# Patient Record
Sex: MALE | Race: WHITE | NOT HISPANIC OR LATINO | Employment: OTHER | URBAN - METROPOLITAN AREA
[De-identification: names, ages, dates, MRNs, and addresses within clinical notes are randomized per-mention and may not be internally consistent; named-entity substitution may affect disease eponyms.]

---

## 2021-03-26 ENCOUNTER — TELEPHONE (OUTPATIENT)
Dept: TRANSPLANT | Facility: CLINIC | Age: 56
End: 2021-03-26

## 2021-03-30 DIAGNOSIS — Z76.82 ORGAN TRANSPLANT CANDIDATE: Primary | ICD-10-CM

## 2021-04-13 ENCOUNTER — TELEPHONE (OUTPATIENT)
Dept: TRANSPLANT | Facility: CLINIC | Age: 56
End: 2021-04-13

## 2021-06-01 ENCOUNTER — OFFICE VISIT (OUTPATIENT)
Dept: TRANSPLANT | Facility: CLINIC | Age: 56
End: 2021-06-01
Payer: MEDICARE

## 2021-06-01 ENCOUNTER — HOSPITAL ENCOUNTER (OUTPATIENT)
Dept: RADIOLOGY | Facility: HOSPITAL | Age: 56
Discharge: HOME | End: 2021-06-01
Attending: NURSE PRACTITIONER
Payer: MEDICARE

## 2021-06-01 ENCOUNTER — TELEPHONE (OUTPATIENT)
Dept: TRANSPLANT | Facility: CLINIC | Age: 56
End: 2021-06-01

## 2021-06-01 VITALS
BODY MASS INDEX: 26.42 KG/M2 | RESPIRATION RATE: 16 BRPM | TEMPERATURE: 99 F | DIASTOLIC BLOOD PRESSURE: 59 MMHG | OXYGEN SATURATION: 100 % | WEIGHT: 188.69 LBS | SYSTOLIC BLOOD PRESSURE: 117 MMHG | HEART RATE: 105 BPM | HEIGHT: 71 IN

## 2021-06-01 DIAGNOSIS — Z76.82 ORGAN TRANSPLANT CANDIDATE: ICD-10-CM

## 2021-06-01 DIAGNOSIS — E78.2 MIXED HYPERLIPIDEMIA: ICD-10-CM

## 2021-06-01 DIAGNOSIS — G47.30 SLEEP APNEA, UNSPECIFIED TYPE: ICD-10-CM

## 2021-06-01 DIAGNOSIS — N18.6 ESRD (END STAGE RENAL DISEASE): ICD-10-CM

## 2021-06-01 DIAGNOSIS — I10 ESSENTIAL HYPERTENSION: ICD-10-CM

## 2021-06-01 DIAGNOSIS — E11.21 DIABETIC NEPHROPATHY ASSOCIATED WITH TYPE 2 DIABETES MELLITUS: ICD-10-CM

## 2021-06-01 PROBLEM — I26.99 PULMONARY EMBOLUS: Status: ACTIVE | Noted: 2021-06-01

## 2021-06-01 PROBLEM — I27.82 CHRONIC PULMONARY EMBOLISM: Status: ACTIVE | Noted: 2021-06-01

## 2021-06-01 PROCEDURE — 76770 US RETROPERITONEAL COMPLETE: ICD-10-PCS | Mod: 26,TXP,, | Performed by: RADIOLOGY

## 2021-06-01 PROCEDURE — 93978 VASCULAR STUDY: CPT | Mod: 26,TXP,, | Performed by: RADIOLOGY

## 2021-06-01 PROCEDURE — 71046 XR CHEST PA AND LATERAL: ICD-10-PCS | Mod: 26,TXP,, | Performed by: RADIOLOGY

## 2021-06-01 PROCEDURE — 93978 US DOPP ILIACS BILATERAL: ICD-10-PCS | Mod: 26,TXP,, | Performed by: RADIOLOGY

## 2021-06-01 PROCEDURE — 71046 X-RAY EXAM CHEST 2 VIEWS: CPT | Mod: TC,TXP

## 2021-06-01 PROCEDURE — 76770 US EXAM ABDO BACK WALL COMP: CPT | Mod: 26,TXP,, | Performed by: RADIOLOGY

## 2021-06-01 PROCEDURE — 99244 PR OFFICE CONSULTATION,LEVEL IV: ICD-10-PCS | Mod: S$PBB,TXP,, | Performed by: SURGERY

## 2021-06-01 PROCEDURE — 72170 X-RAY EXAM OF PELVIS: CPT | Mod: 26,TXP,, | Performed by: RADIOLOGY

## 2021-06-01 PROCEDURE — 99999 PR PBB SHADOW E&M-EST. PATIENT-LVL IV: ICD-10-PCS | Mod: PBBFAC,TXP,, | Performed by: INTERNAL MEDICINE

## 2021-06-01 PROCEDURE — 99999 PR PBB SHADOW E&M-EST. PATIENT-LVL IV: CPT | Mod: PBBFAC,TXP,, | Performed by: INTERNAL MEDICINE

## 2021-06-01 PROCEDURE — 93978 VASCULAR STUDY: CPT | Mod: TC,TXP

## 2021-06-01 PROCEDURE — 99205 PR OFFICE/OUTPT VISIT, NEW, LEVL V, 60-74 MIN: ICD-10-PCS | Mod: S$PBB,TXP,, | Performed by: INTERNAL MEDICINE

## 2021-06-01 PROCEDURE — 71046 X-RAY EXAM CHEST 2 VIEWS: CPT | Mod: 26,TXP,, | Performed by: RADIOLOGY

## 2021-06-01 PROCEDURE — 72170 X-RAY EXAM OF PELVIS: CPT | Mod: TC,TXP

## 2021-06-01 PROCEDURE — 76770 US EXAM ABDO BACK WALL COMP: CPT | Mod: TC,TXP

## 2021-06-01 PROCEDURE — 99205 OFFICE O/P NEW HI 60 MIN: CPT | Mod: S$PBB,TXP,, | Performed by: INTERNAL MEDICINE

## 2021-06-01 PROCEDURE — 99244 OFF/OP CNSLTJ NEW/EST MOD 40: CPT | Mod: S$PBB,TXP,, | Performed by: SURGERY

## 2021-06-01 PROCEDURE — 99214 OFFICE O/P EST MOD 30 MIN: CPT | Mod: PBBFAC,25,TXP | Performed by: INTERNAL MEDICINE

## 2021-06-01 PROCEDURE — 72170 XR PELVIS ROUTINE AP: ICD-10-PCS | Mod: 26,TXP,, | Performed by: RADIOLOGY

## 2021-06-01 RX ORDER — MIDODRINE HYDROCHLORIDE 10 MG/1
10 TABLET ORAL 2 TIMES DAILY
Status: ON HOLD | COMMUNITY
Start: 2021-04-21 | End: 2021-12-28 | Stop reason: HOSPADM

## 2021-06-01 RX ORDER — LANOLIN ALCOHOL/MO/W.PET/CERES
1000 CREAM (GRAM) TOPICAL DAILY
COMMUNITY

## 2021-06-01 RX ORDER — CALCIUM ACETATE 667 MG/1
667 CAPSULE ORAL 3 TIMES DAILY
Status: ON HOLD | COMMUNITY
Start: 2021-05-12 | End: 2021-12-28 | Stop reason: HOSPADM

## 2021-06-01 RX ORDER — POTASSIUM CHLORIDE 750 MG/1
10 TABLET, EXTENDED RELEASE ORAL 2 TIMES DAILY
Status: ON HOLD | COMMUNITY
Start: 2021-04-20 | End: 2021-12-28 | Stop reason: HOSPADM

## 2021-06-01 RX ORDER — CHOLECALCIFEROL (VITAMIN D3) 50 MCG
2000 TABLET ORAL DAILY
COMMUNITY

## 2021-06-01 RX ORDER — INSULIN ASPART 100 [IU]/ML
1-2 INJECTION, SOLUTION INTRAVENOUS; SUBCUTANEOUS
Status: ON HOLD | COMMUNITY
End: 2021-12-28 | Stop reason: SDUPTHER

## 2021-06-01 RX ORDER — PANTOPRAZOLE SODIUM 40 MG/1
1 TABLET, DELAYED RELEASE ORAL DAILY
Status: ON HOLD | COMMUNITY
Start: 2021-04-13 | End: 2021-12-28 | Stop reason: HOSPADM

## 2021-06-02 ENCOUNTER — TELEPHONE (OUTPATIENT)
Dept: TRANSPLANT | Facility: CLINIC | Age: 56
End: 2021-06-02

## 2021-06-08 ENCOUNTER — DOCUMENTATION ONLY (OUTPATIENT)
Dept: TRANSPLANT | Facility: CLINIC | Age: 56
End: 2021-06-08

## 2021-06-10 ENCOUNTER — TELEPHONE (OUTPATIENT)
Dept: TRANSPLANT | Facility: CLINIC | Age: 56
End: 2021-06-10

## 2021-06-21 ENCOUNTER — TELEPHONE (OUTPATIENT)
Dept: TRANSPLANT | Facility: CLINIC | Age: 56
End: 2021-06-21

## 2021-07-19 ENCOUNTER — TELEPHONE (OUTPATIENT)
Dept: TRANSPLANT | Facility: CLINIC | Age: 56
End: 2021-07-19

## 2021-08-13 ENCOUNTER — COMMITTEE REVIEW (OUTPATIENT)
Dept: TRANSPLANT | Facility: CLINIC | Age: 56
End: 2021-08-13

## 2021-08-13 DIAGNOSIS — Z76.82 ORGAN TRANSPLANT CANDIDATE: Primary | ICD-10-CM

## 2021-08-16 ENCOUNTER — TELEPHONE (OUTPATIENT)
Dept: TRANSPLANT | Facility: CLINIC | Age: 56
End: 2021-08-16

## 2021-08-17 ENCOUNTER — TELEPHONE (OUTPATIENT)
Dept: TRANSPLANT | Facility: CLINIC | Age: 56
End: 2021-08-17

## 2021-08-17 DIAGNOSIS — Z76.82 ORGAN TRANSPLANT CANDIDATE: Primary | ICD-10-CM

## 2021-08-20 DIAGNOSIS — Z76.82 ORGAN TRANSPLANT CANDIDATE: Primary | ICD-10-CM

## 2021-09-07 ENCOUNTER — TELEPHONE (OUTPATIENT)
Dept: TRANSPLANT | Facility: CLINIC | Age: 56
End: 2021-09-07

## 2021-09-29 ENCOUNTER — TELEPHONE (OUTPATIENT)
Dept: TRANSPLANT | Facility: CLINIC | Age: 56
End: 2021-09-29

## 2021-10-27 ENCOUNTER — TELEPHONE (OUTPATIENT)
Dept: TRANSPLANT | Facility: CLINIC | Age: 56
End: 2021-10-27
Payer: MEDICARE

## 2021-10-27 ENCOUNTER — TREATMENT PLANNING (OUTPATIENT)
Dept: TRANSPLANT | Facility: HOSPITAL | Age: 56
End: 2021-10-27
Payer: MEDICARE

## 2021-10-28 ENCOUNTER — TELEPHONE (OUTPATIENT)
Dept: TRANSPLANT | Facility: CLINIC | Age: 56
End: 2021-10-28
Payer: MEDICARE

## 2021-12-24 ENCOUNTER — TELEPHONE (OUTPATIENT)
Dept: TRANSPLANT | Facility: CLINIC | Age: 56
End: 2021-12-24
Payer: MEDICARE

## 2021-12-24 ENCOUNTER — ANESTHESIA (OUTPATIENT)
Dept: SURGERY | Facility: HOSPITAL | Age: 56
DRG: 652 | End: 2021-12-24
Payer: MEDICARE

## 2021-12-24 ENCOUNTER — DOCUMENTATION ONLY (OUTPATIENT)
Dept: TRANSPLANT | Facility: HOSPITAL | Age: 56
End: 2021-12-24
Payer: MEDICARE

## 2021-12-24 ENCOUNTER — ANESTHESIA EVENT (OUTPATIENT)
Dept: SURGERY | Facility: HOSPITAL | Age: 56
DRG: 652 | End: 2021-12-24
Payer: MEDICARE

## 2021-12-24 ENCOUNTER — HOSPITAL ENCOUNTER (INPATIENT)
Facility: HOSPITAL | Age: 56
LOS: 4 days | Discharge: HOME | DRG: 652 | End: 2021-12-28
Attending: TRANSPLANT SURGERY | Admitting: TRANSPLANT SURGERY
Payer: MEDICARE

## 2021-12-24 ENCOUNTER — TELEPHONE (OUTPATIENT)
Dept: TRANSPLANT | Facility: HOSPITAL | Age: 56
End: 2021-12-24
Payer: MEDICARE

## 2021-12-24 DIAGNOSIS — Z01.818 PRE-OP TESTING: ICD-10-CM

## 2021-12-24 DIAGNOSIS — Z76.82 KIDNEY TRANSPLANT CANDIDATE: ICD-10-CM

## 2021-12-24 DIAGNOSIS — Z79.60 LONG-TERM USE OF IMMUNOSUPPRESSANT MEDICATION: ICD-10-CM

## 2021-12-24 DIAGNOSIS — N18.5 ANEMIA OF CHRONIC RENAL FAILURE, STAGE 5: ICD-10-CM

## 2021-12-24 DIAGNOSIS — Z29.89 PROPHYLACTIC IMMUNOTHERAPY: ICD-10-CM

## 2021-12-24 DIAGNOSIS — Z76.82 AWAITING ORGAN TRANSPLANT STATUS: Primary | ICD-10-CM

## 2021-12-24 DIAGNOSIS — Z99.2 TYPE 2 DIABETES MELLITUS WITH CHRONIC KIDNEY DISEASE ON CHRONIC DIALYSIS, WITH LONG-TERM CURRENT USE OF INSULIN: ICD-10-CM

## 2021-12-24 DIAGNOSIS — N25.81 SECONDARY HYPERPARATHYROIDISM OF RENAL ORIGIN: ICD-10-CM

## 2021-12-24 DIAGNOSIS — N18.6 TYPE 2 DIABETES MELLITUS WITH CHRONIC KIDNEY DISEASE ON CHRONIC DIALYSIS, WITH LONG-TERM CURRENT USE OF INSULIN: ICD-10-CM

## 2021-12-24 DIAGNOSIS — Z94.0 S/P KIDNEY TRANSPLANT: ICD-10-CM

## 2021-12-24 DIAGNOSIS — E11.22 TYPE 2 DIABETES MELLITUS WITH CHRONIC KIDNEY DISEASE ON CHRONIC DIALYSIS, WITH LONG-TERM CURRENT USE OF INSULIN: ICD-10-CM

## 2021-12-24 DIAGNOSIS — Z91.89 AT RISK FOR OPPORTUNISTIC INFECTIONS: ICD-10-CM

## 2021-12-24 DIAGNOSIS — N18.6 ESRD (END STAGE RENAL DISEASE): ICD-10-CM

## 2021-12-24 DIAGNOSIS — D63.1 ANEMIA OF CHRONIC RENAL FAILURE, STAGE 5: ICD-10-CM

## 2021-12-24 DIAGNOSIS — Z79.4 TYPE 2 DIABETES MELLITUS WITH CHRONIC KIDNEY DISEASE ON CHRONIC DIALYSIS, WITH LONG-TERM CURRENT USE OF INSULIN: ICD-10-CM

## 2021-12-24 LAB
ALBUMIN SERPL BCP-MCNC: 2.4 G/DL (ref 3.5–5.2)
ALP SERPL-CCNC: 105 U/L (ref 55–135)
ALT SERPL W/O P-5'-P-CCNC: 15 U/L (ref 10–44)
ANION GAP SERPL CALC-SCNC: 11 MMOL/L (ref 8–16)
APPEARANCE FLD: NORMAL
APTT BLDCRRT: 23.4 SEC (ref 21–32)
AST SERPL-CCNC: 14 U/L (ref 10–40)
BASOPHILS # BLD AUTO: 0.07 K/UL (ref 0–0.2)
BASOPHILS NFR BLD: 0.5 % (ref 0–1.9)
BILIRUB SERPL-MCNC: 0.5 MG/DL (ref 0.1–1)
BODY FLD TYPE: NORMAL
BUN SERPL-MCNC: 35 MG/DL (ref 6–20)
CALCIUM SERPL-MCNC: 8.4 MG/DL (ref 8.7–10.5)
CHLORIDE SERPL-SCNC: 103 MMOL/L (ref 95–110)
CHOLEST SERPL-MCNC: 160 MG/DL (ref 120–199)
CHOLEST/HDLC SERPL: 4 {RATIO} (ref 2–5)
CO2 SERPL-SCNC: 24 MMOL/L (ref 23–29)
COLOR FLD: COLORLESS
CREAT SERPL-MCNC: 10.9 MG/DL (ref 0.5–1.4)
DIFFERENTIAL METHOD: ABNORMAL
EOSINOPHIL # BLD AUTO: 0.4 K/UL (ref 0–0.5)
EOSINOPHIL NFR BLD: 2.8 % (ref 0–8)
ERYTHROCYTE [DISTWIDTH] IN BLOOD BY AUTOMATED COUNT: 14.5 % (ref 11.5–14.5)
EST. GFR  (AFRICAN AMERICAN): 5.4 ML/MIN/1.73 M^2
EST. GFR  (NON AFRICAN AMERICAN): 4.6 ML/MIN/1.73 M^2
GLUCOSE SERPL-MCNC: 107 MG/DL (ref 70–110)
HCT VFR BLD AUTO: 41.8 % (ref 40–54)
HDLC SERPL-MCNC: 40 MG/DL (ref 40–75)
HDLC SERPL: 25 % (ref 20–50)
HGB BLD-MCNC: 13.7 G/DL (ref 14–18)
IMM GRANULOCYTES # BLD AUTO: 0.05 K/UL (ref 0–0.04)
IMM GRANULOCYTES NFR BLD AUTO: 0.4 % (ref 0–0.5)
INR PPP: 0.9 (ref 0.8–1.2)
LDH SERPL L TO P-CCNC: 195 U/L (ref 110–260)
LDLC SERPL CALC-MCNC: 84.4 MG/DL (ref 63–159)
LYMPHOCYTES # BLD AUTO: 3.5 K/UL (ref 1–4.8)
LYMPHOCYTES NFR BLD: 27.2 % (ref 18–48)
LYMPHOCYTES NFR FLD MANUAL: 1 %
MCH RBC QN AUTO: 30.8 PG (ref 27–31)
MCHC RBC AUTO-ENTMCNC: 32.8 G/DL (ref 32–36)
MCV RBC AUTO: 94 FL (ref 82–98)
MONOCYTES # BLD AUTO: 0.8 K/UL (ref 0.3–1)
MONOCYTES NFR BLD: 6.2 % (ref 4–15)
MONOS+MACROS NFR FLD MANUAL: 97 %
NEUTROPHILS # BLD AUTO: 8 K/UL (ref 1.8–7.7)
NEUTROPHILS NFR BLD: 62.9 % (ref 38–73)
NEUTROPHILS NFR FLD MANUAL: 2 %
NONHDLC SERPL-MCNC: 120 MG/DL
NRBC BLD-RTO: 0 /100 WBC
PHOSPHATE SERPL-MCNC: 4 MG/DL (ref 2.7–4.5)
PLATELET # BLD AUTO: 381 K/UL (ref 150–450)
PMV BLD AUTO: 10.2 FL (ref 9.2–12.9)
POTASSIUM SERPL-SCNC: 4.3 MMOL/L (ref 3.5–5.1)
PROT SERPL-MCNC: 6.3 G/DL (ref 6–8.4)
PROTHROMBIN TIME: 9.8 SEC (ref 9–12.5)
PTH-INTACT SERPL-MCNC: 302.5 PG/ML (ref 9–77)
RBC # BLD AUTO: 4.45 M/UL (ref 4.6–6.2)
SODIUM SERPL-SCNC: 138 MMOL/L (ref 136–145)
TRIGL SERPL-MCNC: 178 MG/DL (ref 30–150)
URATE SERPL-MCNC: 6.1 MG/DL (ref 3.4–7)
WBC # BLD AUTO: 12.78 K/UL (ref 3.9–12.7)
WBC # FLD: 79 /CU MM

## 2021-12-24 PROCEDURE — 87340 HEPATITIS B SURFACE AG IA: CPT | Mod: NTX | Performed by: PHYSICIAN ASSISTANT

## 2021-12-24 PROCEDURE — 84550 ASSAY OF BLOOD/URIC ACID: CPT | Mod: NTX | Performed by: PHYSICIAN ASSISTANT

## 2021-12-24 PROCEDURE — 20600001 HC STEP DOWN PRIVATE ROOM: Mod: NTX

## 2021-12-24 PROCEDURE — 87070 CULTURE OTHR SPECIMN AEROBIC: CPT | Mod: NTX | Performed by: TRANSPLANT SURGERY

## 2021-12-24 PROCEDURE — 86803 HEPATITIS C AB TEST: CPT | Mod: NTX | Performed by: PHYSICIAN ASSISTANT

## 2021-12-24 PROCEDURE — 86900 BLOOD TYPING SEROLOGIC ABO: CPT | Mod: NTX | Performed by: PHYSICIAN ASSISTANT

## 2021-12-24 PROCEDURE — 93010 ELECTROCARDIOGRAM REPORT: CPT | Mod: NTX,,, | Performed by: INTERNAL MEDICINE

## 2021-12-24 PROCEDURE — 87522 HEPATITIS C REVRS TRNSCRPJ: CPT | Mod: NTX | Performed by: PHYSICIAN ASSISTANT

## 2021-12-24 PROCEDURE — 86705 HEP B CORE ANTIBODY IGM: CPT | Mod: NTX | Performed by: PHYSICIAN ASSISTANT

## 2021-12-24 PROCEDURE — 90945 DIALYSIS ONE EVALUATION: CPT | Mod: NTX

## 2021-12-24 PROCEDURE — 86706 HEP B SURFACE ANTIBODY: CPT | Mod: NTX | Performed by: PHYSICIAN ASSISTANT

## 2021-12-24 PROCEDURE — 36415 COLL VENOUS BLD VENIPUNCTURE: CPT | Mod: NTX | Performed by: PHYSICIAN ASSISTANT

## 2021-12-24 PROCEDURE — 63600175 PHARM REV CODE 636 W HCPCS: Mod: NTX | Performed by: PHYSICIAN ASSISTANT

## 2021-12-24 PROCEDURE — 99223 1ST HOSP IP/OBS HIGH 75: CPT | Mod: NTX,,, | Performed by: PHYSICIAN ASSISTANT

## 2021-12-24 PROCEDURE — 85025 COMPLETE CBC W/AUTO DIFF WBC: CPT | Mod: NTX | Performed by: PHYSICIAN ASSISTANT

## 2021-12-24 PROCEDURE — 84100 ASSAY OF PHOSPHORUS: CPT | Mod: NTX | Performed by: PHYSICIAN ASSISTANT

## 2021-12-24 PROCEDURE — 85610 PROTHROMBIN TIME: CPT | Mod: NTX | Performed by: PHYSICIAN ASSISTANT

## 2021-12-24 PROCEDURE — 87389 HIV-1 AG W/HIV-1&-2 AB AG IA: CPT | Mod: NTX | Performed by: PHYSICIAN ASSISTANT

## 2021-12-24 PROCEDURE — 80061 LIPID PANEL: CPT | Mod: NTX | Performed by: PHYSICIAN ASSISTANT

## 2021-12-24 PROCEDURE — 83615 LACTATE (LD) (LDH) ENZYME: CPT | Mod: NTX | Performed by: PHYSICIAN ASSISTANT

## 2021-12-24 PROCEDURE — 83970 ASSAY OF PARATHORMONE: CPT | Mod: NTX | Performed by: PHYSICIAN ASSISTANT

## 2021-12-24 PROCEDURE — 80053 COMPREHEN METABOLIC PANEL: CPT | Mod: NTX | Performed by: PHYSICIAN ASSISTANT

## 2021-12-24 PROCEDURE — 93005 ELECTROCARDIOGRAM TRACING: CPT | Mod: NTX

## 2021-12-24 PROCEDURE — 93010 EKG 12-LEAD: ICD-10-PCS | Mod: NTX,,, | Performed by: INTERNAL MEDICINE

## 2021-12-24 PROCEDURE — 85730 THROMBOPLASTIN TIME PARTIAL: CPT | Mod: NTX | Performed by: PHYSICIAN ASSISTANT

## 2021-12-24 PROCEDURE — 86704 HEP B CORE ANTIBODY TOTAL: CPT | Mod: NTX | Performed by: PHYSICIAN ASSISTANT

## 2021-12-24 PROCEDURE — 89051 BODY FLUID CELL COUNT: CPT | Performed by: TRANSPLANT SURGERY

## 2021-12-24 PROCEDURE — 99223 PR INITIAL HOSPITAL CARE,LEVL III: ICD-10-PCS | Mod: NTX,,, | Performed by: PHYSICIAN ASSISTANT

## 2021-12-24 PROCEDURE — 89051 BODY FLUID CELL COUNT: CPT | Mod: 91,NTX | Performed by: PHYSICIAN ASSISTANT

## 2021-12-24 RX ORDER — PREGABALIN 75 MG/1
75 CAPSULE ORAL
Status: COMPLETED | OUTPATIENT
Start: 2021-12-24 | End: 2021-12-25

## 2021-12-24 RX ORDER — CEFAZOLIN SODIUM 2 G/50ML
2 SOLUTION INTRAVENOUS
Status: COMPLETED | OUTPATIENT
Start: 2021-12-24 | End: 2021-12-25

## 2021-12-24 RX ORDER — DIPHENHYDRAMINE HYDROCHLORIDE 50 MG/ML
50 INJECTION INTRAMUSCULAR; INTRAVENOUS ONCE
Status: DISCONTINUED | OUTPATIENT
Start: 2021-12-24 | End: 2021-12-24

## 2021-12-24 RX ORDER — HEPARIN SODIUM 5000 [USP'U]/ML
5000 INJECTION, SOLUTION INTRAVENOUS; SUBCUTANEOUS ONCE
Status: COMPLETED | OUTPATIENT
Start: 2021-12-24 | End: 2021-12-24

## 2021-12-24 RX ORDER — MUPIROCIN 20 MG/G
OINTMENT TOPICAL
Status: DISCONTINUED | OUTPATIENT
Start: 2021-12-24 | End: 2021-12-26

## 2021-12-24 RX ADMIN — HEPARIN SODIUM 5000 UNITS: 5000 INJECTION INTRAVENOUS; SUBCUTANEOUS at 06:12

## 2021-12-25 PROBLEM — Z94.0 S/P KIDNEY TRANSPLANT: Status: ACTIVE | Noted: 2021-12-25

## 2021-12-25 PROBLEM — Z29.89 PROPHYLACTIC IMMUNOTHERAPY: Status: ACTIVE | Noted: 2021-12-25

## 2021-12-25 PROBLEM — Z99.2 TYPE 2 DIABETES MELLITUS WITH CHRONIC KIDNEY DISEASE ON CHRONIC DIALYSIS, WITH LONG-TERM CURRENT USE OF INSULIN: Status: ACTIVE | Noted: 2021-12-25

## 2021-12-25 PROBLEM — Z79.4 TYPE 2 DIABETES MELLITUS WITH CHRONIC KIDNEY DISEASE ON CHRONIC DIALYSIS, WITH LONG-TERM CURRENT USE OF INSULIN: Status: ACTIVE | Noted: 2021-12-25

## 2021-12-25 PROBLEM — Z91.89 AT RISK FOR OPPORTUNISTIC INFECTIONS: Status: ACTIVE | Noted: 2021-12-25

## 2021-12-25 PROBLEM — E11.22 TYPE 2 DIABETES MELLITUS WITH CHRONIC KIDNEY DISEASE ON CHRONIC DIALYSIS, WITH LONG-TERM CURRENT USE OF INSULIN: Status: ACTIVE | Noted: 2021-12-25

## 2021-12-25 PROBLEM — N18.6 TYPE 2 DIABETES MELLITUS WITH CHRONIC KIDNEY DISEASE ON CHRONIC DIALYSIS, WITH LONG-TERM CURRENT USE OF INSULIN: Status: ACTIVE | Noted: 2021-12-25

## 2021-12-25 LAB
ABO + RH BLD: NORMAL
ALBUMIN SERPL BCP-MCNC: 2 G/DL (ref 3.5–5.2)
ANION GAP SERPL CALC-SCNC: 11 MMOL/L (ref 8–16)
ANION GAP SERPL CALC-SCNC: 11 MMOL/L (ref 8–16)
ANION GAP SERPL CALC-SCNC: 13 MMOL/L (ref 8–16)
APPEARANCE FLD: CLEAR
BASOPHILS # BLD AUTO: 0.01 K/UL (ref 0–0.2)
BASOPHILS # BLD AUTO: 0.06 K/UL (ref 0–0.2)
BASOPHILS NFR BLD: 0.1 % (ref 0–1.9)
BASOPHILS NFR BLD: 0.6 % (ref 0–1.9)
BLD GP AB SCN CELLS X3 SERPL QL: NORMAL
BODY FLD TYPE: NORMAL
BUN SERPL-MCNC: 30 MG/DL (ref 6–20)
BUN SERPL-MCNC: 33 MG/DL (ref 6–20)
BUN SERPL-MCNC: 35 MG/DL (ref 6–20)
CALCIUM SERPL-MCNC: 8.1 MG/DL (ref 8.7–10.5)
CALCIUM SERPL-MCNC: 8.3 MG/DL (ref 8.7–10.5)
CALCIUM SERPL-MCNC: 8.4 MG/DL (ref 8.7–10.5)
CHLORIDE SERPL-SCNC: 100 MMOL/L (ref 95–110)
CHLORIDE SERPL-SCNC: 101 MMOL/L (ref 95–110)
CHLORIDE SERPL-SCNC: 99 MMOL/L (ref 95–110)
CO2 SERPL-SCNC: 19 MMOL/L (ref 23–29)
CO2 SERPL-SCNC: 20 MMOL/L (ref 23–29)
CO2 SERPL-SCNC: 24 MMOL/L (ref 23–29)
COLOR FLD: COLORLESS
CREAT SERPL-MCNC: 11.7 MG/DL (ref 0.5–1.4)
CREAT SERPL-MCNC: 11.8 MG/DL (ref 0.5–1.4)
CREAT SERPL-MCNC: 11.9 MG/DL (ref 0.5–1.4)
DIFFERENTIAL METHOD: ABNORMAL
DIFFERENTIAL METHOD: ABNORMAL
EOSINOPHIL # BLD AUTO: 0 K/UL (ref 0–0.5)
EOSINOPHIL # BLD AUTO: 0.4 K/UL (ref 0–0.5)
EOSINOPHIL NFR BLD: 0 % (ref 0–8)
EOSINOPHIL NFR BLD: 4.1 % (ref 0–8)
ERYTHROCYTE [DISTWIDTH] IN BLOOD BY AUTOMATED COUNT: 14.1 % (ref 11.5–14.5)
ERYTHROCYTE [DISTWIDTH] IN BLOOD BY AUTOMATED COUNT: 14.2 % (ref 11.5–14.5)
EST. GFR  (AFRICAN AMERICAN): 4.8 ML/MIN/1.73 M^2
EST. GFR  (AFRICAN AMERICAN): 4.9 ML/MIN/1.73 M^2
EST. GFR  (AFRICAN AMERICAN): 4.9 ML/MIN/1.73 M^2
EST. GFR  (NON AFRICAN AMERICAN): 4.2 ML/MIN/1.73 M^2
EST. GFR  (NON AFRICAN AMERICAN): 4.2 ML/MIN/1.73 M^2
EST. GFR  (NON AFRICAN AMERICAN): 4.3 ML/MIN/1.73 M^2
ESTIMATED AVG GLUCOSE: 123 MG/DL (ref 68–131)
GLUCOSE SERPL-MCNC: 115 MG/DL (ref 70–110)
GLUCOSE SERPL-MCNC: 164 MG/DL (ref 70–110)
GLUCOSE SERPL-MCNC: 216 MG/DL (ref 70–110)
HBA1C MFR BLD: 5.9 % (ref 4–5.6)
HCT VFR BLD AUTO: 36.2 % (ref 40–54)
HCT VFR BLD AUTO: 36.2 % (ref 40–54)
HCT VFR BLD AUTO: 36.9 % (ref 40–54)
HGB BLD-MCNC: 11.6 G/DL (ref 14–18)
HGB BLD-MCNC: 12.2 G/DL (ref 14–18)
IMM GRANULOCYTES # BLD AUTO: 0.04 K/UL (ref 0–0.04)
IMM GRANULOCYTES # BLD AUTO: 0.09 K/UL (ref 0–0.04)
IMM GRANULOCYTES NFR BLD AUTO: 0.4 % (ref 0–0.5)
IMM GRANULOCYTES NFR BLD AUTO: 0.6 % (ref 0–0.5)
LYMPHOCYTES # BLD AUTO: 0.7 K/UL (ref 1–4.8)
LYMPHOCYTES # BLD AUTO: 3.6 K/UL (ref 1–4.8)
LYMPHOCYTES NFR BLD: 36.8 % (ref 18–48)
LYMPHOCYTES NFR BLD: 4.7 % (ref 18–48)
LYMPHOCYTES NFR FLD MANUAL: 16 %
MCH RBC QN AUTO: 30.3 PG (ref 27–31)
MCH RBC QN AUTO: 30.9 PG (ref 27–31)
MCHC RBC AUTO-ENTMCNC: 32 G/DL (ref 32–36)
MCHC RBC AUTO-ENTMCNC: 33.1 G/DL (ref 32–36)
MCV RBC AUTO: 93 FL (ref 82–98)
MCV RBC AUTO: 95 FL (ref 82–98)
MESOTHL CELL NFR FLD MANUAL: 4 %
MONOCYTES # BLD AUTO: 0.2 K/UL (ref 0.3–1)
MONOCYTES # BLD AUTO: 0.7 K/UL (ref 0.3–1)
MONOCYTES NFR BLD: 1 % (ref 4–15)
MONOCYTES NFR BLD: 6.9 % (ref 4–15)
MONOS+MACROS NFR FLD MANUAL: 73 %
NEUTROPHILS # BLD AUTO: 14.6 K/UL (ref 1.8–7.7)
NEUTROPHILS # BLD AUTO: 5.1 K/UL (ref 1.8–7.7)
NEUTROPHILS NFR BLD: 51.2 % (ref 38–73)
NEUTROPHILS NFR BLD: 93.6 % (ref 38–73)
NEUTROPHILS NFR FLD MANUAL: 7 %
NRBC BLD-RTO: 0 /100 WBC
NRBC BLD-RTO: 0 /100 WBC
PHOSPHATE SERPL-MCNC: 4.4 MG/DL (ref 2.7–4.5)
PHOSPHATE SERPL-MCNC: 5 MG/DL (ref 2.7–4.5)
PHOSPHATE SERPL-MCNC: 6.1 MG/DL (ref 2.7–4.5)
PLATELET # BLD AUTO: 269 K/UL (ref 150–450)
PLATELET # BLD AUTO: 291 K/UL (ref 150–450)
PMV BLD AUTO: 10.2 FL (ref 9.2–12.9)
PMV BLD AUTO: 10.6 FL (ref 9.2–12.9)
POCT GLUCOSE: 101 MG/DL (ref 70–110)
POCT GLUCOSE: 172 MG/DL (ref 70–110)
POCT GLUCOSE: 241 MG/DL (ref 70–110)
POTASSIUM SERPL-SCNC: 3.5 MMOL/L (ref 3.5–5.1)
POTASSIUM SERPL-SCNC: 4.4 MMOL/L (ref 3.5–5.1)
POTASSIUM SERPL-SCNC: 5 MMOL/L (ref 3.5–5.1)
RBC # BLD AUTO: 3.83 M/UL (ref 4.6–6.2)
RBC # BLD AUTO: 3.95 M/UL (ref 4.6–6.2)
SODIUM SERPL-SCNC: 131 MMOL/L (ref 136–145)
SODIUM SERPL-SCNC: 132 MMOL/L (ref 136–145)
SODIUM SERPL-SCNC: 135 MMOL/L (ref 136–145)
WBC # BLD AUTO: 15.56 K/UL (ref 3.9–12.7)
WBC # BLD AUTO: 9.86 K/UL (ref 3.9–12.7)
WBC # FLD: 57 /CU MM

## 2021-12-25 PROCEDURE — 81200001 HC KIDNEY ACQUISITION - CADAVER

## 2021-12-25 PROCEDURE — 50360 RNL ALTRNSPLJ W/O RCP NFRCT: CPT | Mod: RT,GC,, | Performed by: TRANSPLANT SURGERY

## 2021-12-25 PROCEDURE — 36000931 HC OR TIME LEV VII EA ADD 15 MIN: Performed by: TRANSPLANT SURGERY

## 2021-12-25 PROCEDURE — 63600175 PHARM REV CODE 636 W HCPCS: Performed by: NURSE PRACTITIONER

## 2021-12-25 PROCEDURE — 85025 COMPLETE CBC W/AUTO DIFF WBC: CPT | Mod: 91 | Performed by: NURSE PRACTITIONER

## 2021-12-25 PROCEDURE — 50360 PR TRANSPLANTATION OF KIDNEY: ICD-10-PCS | Mod: RT,GC,, | Performed by: TRANSPLANT SURGERY

## 2021-12-25 PROCEDURE — 87205 SMEAR GRAM STAIN: CPT | Mod: 59,NTX | Performed by: PHYSICIAN ASSISTANT

## 2021-12-25 PROCEDURE — 71000033 HC RECOVERY, INTIAL HOUR: Performed by: TRANSPLANT SURGERY

## 2021-12-25 PROCEDURE — 36620 INSERTION CATHETER ARTERY: CPT | Mod: 59,,, | Performed by: ANESTHESIOLOGY

## 2021-12-25 PROCEDURE — C1729 CATH, DRAINAGE: HCPCS | Performed by: TRANSPLANT SURGERY

## 2021-12-25 PROCEDURE — 86825 HLA X-MATH NON-CYTOTOXIC: CPT | Mod: 91 | Performed by: PHYSICIAN ASSISTANT

## 2021-12-25 PROCEDURE — 20600001 HC STEP DOWN PRIVATE ROOM

## 2021-12-25 PROCEDURE — 25000003 PHARM REV CODE 250: Performed by: STUDENT IN AN ORGANIZED HEALTH CARE EDUCATION/TRAINING PROGRAM

## 2021-12-25 PROCEDURE — 36415 COLL VENOUS BLD VENIPUNCTURE: CPT | Performed by: STUDENT IN AN ORGANIZED HEALTH CARE EDUCATION/TRAINING PROGRAM

## 2021-12-25 PROCEDURE — 63600175 PHARM REV CODE 636 W HCPCS: Performed by: ANESTHESIOLOGY

## 2021-12-25 PROCEDURE — 50323 PR TRANSPLANT,PREP CADAVER RENAL GRAFT: ICD-10-PCS | Mod: 51,RT,GC, | Performed by: TRANSPLANT SURGERY

## 2021-12-25 PROCEDURE — 25000003 PHARM REV CODE 250: Mod: NTX | Performed by: PHYSICIAN ASSISTANT

## 2021-12-25 PROCEDURE — D9220A PRA ANESTHESIA: Mod: CRNA,,, | Performed by: STUDENT IN AN ORGANIZED HEALTH CARE EDUCATION/TRAINING PROGRAM

## 2021-12-25 PROCEDURE — D9220A PRA ANESTHESIA: Mod: ANES,,, | Performed by: ANESTHESIOLOGY

## 2021-12-25 PROCEDURE — 86900 BLOOD TYPING SEROLOGIC ABO: CPT | Performed by: PHYSICIAN ASSISTANT

## 2021-12-25 PROCEDURE — 86826 HLA X-MATCH NONCYTOTOXC ADDL: CPT | Performed by: PHYSICIAN ASSISTANT

## 2021-12-25 PROCEDURE — 27201423 OPTIME MED/SURG SUP & DEVICES STERILE SUPPLY: Performed by: TRANSPLANT SURGERY

## 2021-12-25 PROCEDURE — C2617 STENT, NON-COR, TEM W/O DEL: HCPCS | Performed by: TRANSPLANT SURGERY

## 2021-12-25 PROCEDURE — 80069 RENAL FUNCTION PANEL: CPT | Mod: 91 | Performed by: STUDENT IN AN ORGANIZED HEALTH CARE EDUCATION/TRAINING PROGRAM

## 2021-12-25 PROCEDURE — 85014 HEMATOCRIT: CPT | Performed by: STUDENT IN AN ORGANIZED HEALTH CARE EDUCATION/TRAINING PROGRAM

## 2021-12-25 PROCEDURE — 36000930 HC OR TIME LEV VII 1ST 15 MIN: Performed by: TRANSPLANT SURGERY

## 2021-12-25 PROCEDURE — 82962 GLUCOSE BLOOD TEST: CPT | Performed by: TRANSPLANT SURGERY

## 2021-12-25 PROCEDURE — 36415 COLL VENOUS BLD VENIPUNCTURE: CPT | Performed by: NURSE PRACTITIONER

## 2021-12-25 PROCEDURE — 37000009 HC ANESTHESIA EA ADD 15 MINS: Performed by: TRANSPLANT SURGERY

## 2021-12-25 PROCEDURE — 86825 HLA X-MATH NON-CYTOTOXIC: CPT | Performed by: PHYSICIAN ASSISTANT

## 2021-12-25 PROCEDURE — 63600175 PHARM REV CODE 636 W HCPCS: Mod: NTX | Performed by: PHYSICIAN ASSISTANT

## 2021-12-25 PROCEDURE — 50605 PR URETEROTOMY TO INSERT STENT: ICD-10-PCS | Mod: 51,RT,GC, | Performed by: TRANSPLANT SURGERY

## 2021-12-25 PROCEDURE — 36415 COLL VENOUS BLD VENIPUNCTURE: CPT | Performed by: PHYSICIAN ASSISTANT

## 2021-12-25 PROCEDURE — 80069 RENAL FUNCTION PANEL: CPT | Mod: 91 | Performed by: NURSE PRACTITIONER

## 2021-12-25 PROCEDURE — 25000003 PHARM REV CODE 250: Performed by: ANESTHESIOLOGY

## 2021-12-25 PROCEDURE — 27200950 HC CAPD SUPPORT: Mod: NTX

## 2021-12-25 PROCEDURE — D9220A PRA ANESTHESIA: ICD-10-PCS | Mod: ANES,,, | Performed by: ANESTHESIOLOGY

## 2021-12-25 PROCEDURE — 87070 CULTURE OTHR SPECIMN AEROBIC: CPT | Mod: NTX | Performed by: PHYSICIAN ASSISTANT

## 2021-12-25 PROCEDURE — 86833 HLA CLASS II HIGH DEFIN QUAL: CPT | Performed by: PHYSICIAN ASSISTANT

## 2021-12-25 PROCEDURE — 81300010 HC KIDNEY TRANSPORT, FLIGHT WITHIN 301-700 MILES

## 2021-12-25 PROCEDURE — 63600175 PHARM REV CODE 636 W HCPCS: Performed by: STUDENT IN AN ORGANIZED HEALTH CARE EDUCATION/TRAINING PROGRAM

## 2021-12-25 PROCEDURE — 50605 INSERT URETERAL SUPPORT: CPT | Mod: 51,RT,GC, | Performed by: TRANSPLANT SURGERY

## 2021-12-25 PROCEDURE — 37000008 HC ANESTHESIA 1ST 15 MINUTES: Performed by: TRANSPLANT SURGERY

## 2021-12-25 PROCEDURE — 87075 CULTR BACTERIA EXCEPT BLOOD: CPT | Mod: NTX | Performed by: PHYSICIAN ASSISTANT

## 2021-12-25 PROCEDURE — 86826 HLA X-MATCH NONCYTOTOXC ADDL: CPT | Mod: 91 | Performed by: PHYSICIAN ASSISTANT

## 2021-12-25 PROCEDURE — 63600175 PHARM REV CODE 636 W HCPCS: Performed by: TRANSPLANT SURGERY

## 2021-12-25 PROCEDURE — 99233 PR SUBSEQUENT HOSPITAL CARE,LEVL III: ICD-10-PCS | Mod: NTX,,, | Performed by: INTERNAL MEDICINE

## 2021-12-25 PROCEDURE — 99233 SBSQ HOSP IP/OBS HIGH 50: CPT | Mod: NTX,,, | Performed by: INTERNAL MEDICINE

## 2021-12-25 PROCEDURE — 85025 COMPLETE CBC W/AUTO DIFF WBC: CPT | Performed by: PHYSICIAN ASSISTANT

## 2021-12-25 PROCEDURE — 86832 HLA CLASS I HIGH DEFIN QUAL: CPT | Performed by: PHYSICIAN ASSISTANT

## 2021-12-25 PROCEDURE — 80069 RENAL FUNCTION PANEL: CPT | Performed by: PHYSICIAN ASSISTANT

## 2021-12-25 PROCEDURE — 90945 DIALYSIS ONE EVALUATION: CPT | Mod: NTX

## 2021-12-25 PROCEDURE — 71000015 HC POSTOP RECOV 1ST HR: Performed by: TRANSPLANT SURGERY

## 2021-12-25 PROCEDURE — 25000003 PHARM REV CODE 250: Performed by: NURSE PRACTITIONER

## 2021-12-25 PROCEDURE — 86977 RBC SERUM PRETX INCUBJ/INHIB: CPT | Performed by: PHYSICIAN ASSISTANT

## 2021-12-25 PROCEDURE — 36620 PR INSERT CATH,ART,PERCUT,SHORTTERM: ICD-10-PCS | Mod: 59,,, | Performed by: ANESTHESIOLOGY

## 2021-12-25 PROCEDURE — 83036 HEMOGLOBIN GLYCOSYLATED A1C: CPT | Performed by: PHYSICIAN ASSISTANT

## 2021-12-25 PROCEDURE — D9220A PRA ANESTHESIA: ICD-10-PCS | Mod: CRNA,,, | Performed by: STUDENT IN AN ORGANIZED HEALTH CARE EDUCATION/TRAINING PROGRAM

## 2021-12-25 DEVICE — STENT DOUBLE J 7FRX12CM
Type: IMPLANTABLE DEVICE | Site: URETER | Status: NON-FUNCTIONAL
Removed: 2022-01-13

## 2021-12-25 RX ORDER — INSULIN ASPART 100 [IU]/ML
0-5 INJECTION, SOLUTION INTRAVENOUS; SUBCUTANEOUS EVERY 6 HOURS PRN
Status: DISCONTINUED | OUTPATIENT
Start: 2021-12-25 | End: 2021-12-26

## 2021-12-25 RX ORDER — SODIUM CHLORIDE 0.9 % (FLUSH) 0.9 %
3 SYRINGE (ML) INJECTION
Status: DISCONTINUED | OUTPATIENT
Start: 2021-12-25 | End: 2021-12-28 | Stop reason: HOSPADM

## 2021-12-25 RX ORDER — CISATRACURIUM BESYLATE 2 MG/ML
INJECTION, SOLUTION INTRAVENOUS
Status: DISCONTINUED | OUTPATIENT
Start: 2021-12-25 | End: 2021-12-25

## 2021-12-25 RX ORDER — LIDOCAINE HYDROCHLORIDE 20 MG/ML
INJECTION INTRAVENOUS
Status: DISCONTINUED | OUTPATIENT
Start: 2021-12-25 | End: 2021-12-25

## 2021-12-25 RX ORDER — DEXTROSE MONOHYDRATE AND SODIUM CHLORIDE 5; .45 G/100ML; G/100ML
INJECTION, SOLUTION INTRAVENOUS CONTINUOUS
Status: DISCONTINUED | OUTPATIENT
Start: 2021-12-25 | End: 2021-12-26

## 2021-12-25 RX ORDER — NEOSTIGMINE METHYLSULFATE 0.5 MG/ML
INJECTION, SOLUTION INTRAVENOUS
Status: DISCONTINUED | OUTPATIENT
Start: 2021-12-25 | End: 2021-12-25

## 2021-12-25 RX ORDER — ONDANSETRON 2 MG/ML
INJECTION INTRAMUSCULAR; INTRAVENOUS
Status: DISCONTINUED | OUTPATIENT
Start: 2021-12-25 | End: 2021-12-25

## 2021-12-25 RX ORDER — FUROSEMIDE 10 MG/ML
INJECTION INTRAMUSCULAR; INTRAVENOUS
Status: DISCONTINUED | OUTPATIENT
Start: 2021-12-25 | End: 2021-12-25

## 2021-12-25 RX ORDER — TRAMADOL HYDROCHLORIDE 50 MG/1
50 TABLET ORAL EVERY 6 HOURS PRN
Status: DISCONTINUED | OUTPATIENT
Start: 2021-12-25 | End: 2021-12-26

## 2021-12-25 RX ORDER — OXYCODONE HYDROCHLORIDE 10 MG/1
10 TABLET ORAL EVERY 6 HOURS PRN
Status: DISCONTINUED | OUTPATIENT
Start: 2021-12-25 | End: 2021-12-26

## 2021-12-25 RX ORDER — DIPHENHYDRAMINE HYDROCHLORIDE 50 MG/ML
INJECTION INTRAMUSCULAR; INTRAVENOUS
Status: DISCONTINUED | OUTPATIENT
Start: 2021-12-25 | End: 2021-12-25

## 2021-12-25 RX ORDER — HALOPERIDOL 5 MG/ML
0.5 INJECTION INTRAMUSCULAR EVERY 10 MIN PRN
Status: DISCONTINUED | OUTPATIENT
Start: 2021-12-25 | End: 2021-12-26

## 2021-12-25 RX ORDER — CEFAZOLIN SODIUM 1 G/3ML
INJECTION, POWDER, FOR SOLUTION INTRAMUSCULAR; INTRAVENOUS
Status: DISCONTINUED | OUTPATIENT
Start: 2021-12-25 | End: 2021-12-25 | Stop reason: HOSPADM

## 2021-12-25 RX ORDER — PROPOFOL 10 MG/ML
VIAL (ML) INTRAVENOUS
Status: DISCONTINUED | OUTPATIENT
Start: 2021-12-25 | End: 2021-12-25

## 2021-12-25 RX ORDER — PHENYLEPHRINE HCL IN 0.9% NACL 1 MG/10 ML
SYRINGE (ML) INTRAVENOUS
Status: DISCONTINUED | OUTPATIENT
Start: 2021-12-25 | End: 2021-12-25

## 2021-12-25 RX ORDER — HYDROMORPHONE HYDROCHLORIDE 1 MG/ML
0.2 INJECTION, SOLUTION INTRAMUSCULAR; INTRAVENOUS; SUBCUTANEOUS EVERY 5 MIN PRN
Status: DISCONTINUED | OUTPATIENT
Start: 2021-12-25 | End: 2021-12-26

## 2021-12-25 RX ORDER — HEPARIN SODIUM 10000 [USP'U]/ML
INJECTION, SOLUTION INTRAVENOUS; SUBCUTANEOUS
Status: DISCONTINUED | OUTPATIENT
Start: 2021-12-25 | End: 2021-12-25 | Stop reason: HOSPADM

## 2021-12-25 RX ORDER — MANNITOL 20 G/100ML
INJECTION, SOLUTION INTRAVENOUS
Status: DISCONTINUED | OUTPATIENT
Start: 2021-12-25 | End: 2021-12-25

## 2021-12-25 RX ORDER — DEXMEDETOMIDINE HYDROCHLORIDE 100 UG/ML
INJECTION, SOLUTION INTRAVENOUS
Status: DISCONTINUED | OUTPATIENT
Start: 2021-12-25 | End: 2021-12-25

## 2021-12-25 RX ORDER — FAMOTIDINE 20 MG/1
20 TABLET, FILM COATED ORAL NIGHTLY
Status: DISCONTINUED | OUTPATIENT
Start: 2021-12-25 | End: 2021-12-28 | Stop reason: HOSPADM

## 2021-12-25 RX ORDER — DEXAMETHASONE SODIUM PHOSPHATE 4 MG/ML
INJECTION, SOLUTION INTRA-ARTICULAR; INTRALESIONAL; INTRAMUSCULAR; INTRAVENOUS; SOFT TISSUE
Status: DISCONTINUED | OUTPATIENT
Start: 2021-12-25 | End: 2021-12-25

## 2021-12-25 RX ORDER — OXYCODONE HYDROCHLORIDE 5 MG/1
5 TABLET ORAL EVERY 6 HOURS PRN
Status: DISCONTINUED | OUTPATIENT
Start: 2021-12-25 | End: 2021-12-26

## 2021-12-25 RX ORDER — ACETAMINOPHEN 325 MG/1
TABLET ORAL
Status: DISCONTINUED | OUTPATIENT
Start: 2021-12-25 | End: 2021-12-25

## 2021-12-25 RX ORDER — GLUCAGON 1 MG
1 KIT INJECTION
Status: DISCONTINUED | OUTPATIENT
Start: 2021-12-25 | End: 2021-12-26

## 2021-12-25 RX ORDER — CALCIUM CHLORIDE INJECTION 100 MG/ML
INJECTION, SOLUTION INTRAVENOUS
Status: DISCONTINUED | OUTPATIENT
Start: 2021-12-25 | End: 2021-12-25

## 2021-12-25 RX ORDER — FENTANYL CITRATE 50 UG/ML
INJECTION, SOLUTION INTRAMUSCULAR; INTRAVENOUS
Status: DISCONTINUED | OUTPATIENT
Start: 2021-12-25 | End: 2021-12-25

## 2021-12-25 RX ORDER — SODIUM CHLORIDE 9 MG/ML
INJECTION, SOLUTION INTRAVENOUS CONTINUOUS
Status: DISCONTINUED | OUTPATIENT
Start: 2021-12-25 | End: 2021-12-26

## 2021-12-25 RX ADMIN — CISATRACURIUM BESYLATE 6 MG: 2 INJECTION INTRAVENOUS at 02:12

## 2021-12-25 RX ADMIN — SODIUM CHLORIDE: 0.9 INJECTION, SOLUTION INTRAVENOUS at 12:12

## 2021-12-25 RX ADMIN — Medication 100 MCG: at 02:12

## 2021-12-25 RX ADMIN — CISATRACURIUM BESYLATE 2 MG: 2 INJECTION INTRAVENOUS at 02:12

## 2021-12-25 RX ADMIN — Medication 100 MCG: at 03:12

## 2021-12-25 RX ADMIN — SODIUM CHLORIDE 0.5 MCG/KG/MIN: 9 INJECTION, SOLUTION INTRAVENOUS at 02:12

## 2021-12-25 RX ADMIN — PROPOFOL 30 MG: 10 INJECTION, EMULSION INTRAVENOUS at 02:12

## 2021-12-25 RX ADMIN — FAMOTIDINE 20 MG: 20 TABLET ORAL at 08:12

## 2021-12-25 RX ADMIN — SODIUM CHLORIDE 1000 ML: 0.9 INJECTION, SOLUTION INTRAVENOUS at 07:12

## 2021-12-25 RX ADMIN — Medication 100 MCG: at 01:12

## 2021-12-25 RX ADMIN — Medication 200 MCG: at 04:12

## 2021-12-25 RX ADMIN — PREGABALIN 75 MG: 75 CAPSULE ORAL at 12:12

## 2021-12-25 RX ADMIN — NEOSTIGMINE METHYLSULFATE 3 MG: 0.5 INJECTION INTRAVENOUS at 03:12

## 2021-12-25 RX ADMIN — HYDROMORPHONE HYDROCHLORIDE 0.2 MG: 1 INJECTION, SOLUTION INTRAMUSCULAR; INTRAVENOUS; SUBCUTANEOUS at 05:12

## 2021-12-25 RX ADMIN — SODIUM CHLORIDE 20 MG: 9 INJECTION, SOLUTION INTRAVENOUS at 01:12

## 2021-12-25 RX ADMIN — Medication 50 MCG: at 02:12

## 2021-12-25 RX ADMIN — FUROSEMIDE 100 MG: 10 INJECTION, SOLUTION INTRAMUSCULAR; INTRAVENOUS at 02:12

## 2021-12-25 RX ADMIN — INSULIN ASPART 1 UNITS: 100 INJECTION, SOLUTION INTRAVENOUS; SUBCUTANEOUS at 11:12

## 2021-12-25 RX ADMIN — CISATRACURIUM BESYLATE 10 MG: 2 INJECTION INTRAVENOUS at 12:12

## 2021-12-25 RX ADMIN — OXYCODONE HYDROCHLORIDE 10 MG: 10 TABLET ORAL at 07:12

## 2021-12-25 RX ADMIN — HYDROMORPHONE HYDROCHLORIDE 0.2 MG: 1 INJECTION, SOLUTION INTRAMUSCULAR; INTRAVENOUS; SUBCUTANEOUS at 06:12

## 2021-12-25 RX ADMIN — ONDANSETRON 1 G: 2 INJECTION INTRAMUSCULAR; INTRAVENOUS at 04:12

## 2021-12-25 RX ADMIN — PROPOFOL 50 MG: 10 INJECTION, EMULSION INTRAVENOUS at 03:12

## 2021-12-25 RX ADMIN — DEXTROSE 500 MG: 50 INJECTION, SOLUTION INTRAVENOUS at 12:12

## 2021-12-25 RX ADMIN — ONDANSETRON 4 MG: 2 INJECTION INTRAMUSCULAR; INTRAVENOUS at 03:12

## 2021-12-25 RX ADMIN — CISATRACURIUM BESYLATE 4 MG: 2 INJECTION INTRAVENOUS at 12:12

## 2021-12-25 RX ADMIN — MANNITOL 25 G: 20 INJECTION, SOLUTION INTRAVENOUS at 02:12

## 2021-12-25 RX ADMIN — DEXMEDETOMIDINE HYDROCHLORIDE 4 MCG: 100 INJECTION, SOLUTION INTRAVENOUS at 02:12

## 2021-12-25 RX ADMIN — GLYCOPYRROLATE 0.6 MG: 0.2 INJECTION, SOLUTION INTRAMUSCULAR; INTRAVITREAL at 03:12

## 2021-12-25 RX ADMIN — FENTANYL CITRATE 100 MCG: 50 INJECTION, SOLUTION INTRAMUSCULAR; INTRAVENOUS at 12:12

## 2021-12-25 RX ADMIN — Medication 50 MCG: at 01:12

## 2021-12-25 RX ADMIN — PROPOFOL 120 MG: 10 INJECTION, EMULSION INTRAVENOUS at 12:12

## 2021-12-25 RX ADMIN — ACETAMINOPHEN 650 MG: 325 TABLET ORAL at 12:12

## 2021-12-25 RX ADMIN — CEFAZOLIN SODIUM 2 G: 2 SOLUTION INTRAVENOUS at 12:12

## 2021-12-25 RX ADMIN — DIPHENHYDRAMINE HYDROCHLORIDE 50 MG: 50 INJECTION, SOLUTION INTRAMUSCULAR; INTRAVENOUS at 01:12

## 2021-12-25 RX ADMIN — DEXAMETHASONE SODIUM PHOSPHATE 4 MG: 4 INJECTION INTRA-ARTICULAR; INTRALESIONAL; INTRAMUSCULAR; INTRAVENOUS; SOFT TISSUE at 03:12

## 2021-12-25 RX ADMIN — LIDOCAINE HYDROCHLORIDE 100 MG: 20 INJECTION, SOLUTION INTRAVENOUS at 12:12

## 2021-12-26 PROBLEM — T38.0X5A ADRENAL CORTICAL STEROIDS CAUSING ADVERSE EFFECT IN THERAPEUTIC USE: Status: ACTIVE | Noted: 2021-12-26

## 2021-12-26 LAB
ALBUMIN SERPL BCP-MCNC: 2 G/DL (ref 3.5–5.2)
ALBUMIN SERPL BCP-MCNC: 2 G/DL (ref 3.5–5.2)
ALBUMIN SERPL BCP-MCNC: 2.1 G/DL (ref 3.5–5.2)
ANION GAP SERPL CALC-SCNC: 12 MMOL/L (ref 8–16)
ANION GAP SERPL CALC-SCNC: 15 MMOL/L (ref 8–16)
ANION GAP SERPL CALC-SCNC: 15 MMOL/L (ref 8–16)
BASOPHILS # BLD AUTO: 0.01 K/UL (ref 0–0.2)
BASOPHILS NFR BLD: 0.1 % (ref 0–1.9)
BUN SERPL-MCNC: 37 MG/DL (ref 6–20)
BUN SERPL-MCNC: 41 MG/DL (ref 6–20)
BUN SERPL-MCNC: 44 MG/DL (ref 6–20)
CALCIUM SERPL-MCNC: 7.8 MG/DL (ref 8.7–10.5)
CALCIUM SERPL-MCNC: 7.9 MG/DL (ref 8.7–10.5)
CALCIUM SERPL-MCNC: 8.5 MG/DL (ref 8.7–10.5)
CHLORIDE SERPL-SCNC: 92 MMOL/L (ref 95–110)
CHLORIDE SERPL-SCNC: 95 MMOL/L (ref 95–110)
CHLORIDE SERPL-SCNC: 96 MMOL/L (ref 95–110)
CO2 SERPL-SCNC: 13 MMOL/L (ref 23–29)
CO2 SERPL-SCNC: 18 MMOL/L (ref 23–29)
CO2 SERPL-SCNC: 18 MMOL/L (ref 23–29)
CREAT SERPL-MCNC: 10.3 MG/DL (ref 0.5–1.4)
CREAT SERPL-MCNC: 9.4 MG/DL (ref 0.5–1.4)
CREAT SERPL-MCNC: 9.6 MG/DL (ref 0.5–1.4)
DIFFERENTIAL METHOD: ABNORMAL
EOSINOPHIL # BLD AUTO: 0 K/UL (ref 0–0.5)
EOSINOPHIL NFR BLD: 0.1 % (ref 0–8)
ERYTHROCYTE [DISTWIDTH] IN BLOOD BY AUTOMATED COUNT: 14.4 % (ref 11.5–14.5)
EST. GFR  (AFRICAN AMERICAN): 5.7 ML/MIN/1.73 M^2
EST. GFR  (AFRICAN AMERICAN): 6.2 ML/MIN/1.73 M^2
EST. GFR  (AFRICAN AMERICAN): 6.4 ML/MIN/1.73 M^2
EST. GFR  (NON AFRICAN AMERICAN): 5 ML/MIN/1.73 M^2
EST. GFR  (NON AFRICAN AMERICAN): 5.4 ML/MIN/1.73 M^2
EST. GFR  (NON AFRICAN AMERICAN): 5.5 ML/MIN/1.73 M^2
GLUCOSE SERPL-MCNC: 237 MG/DL (ref 70–110)
GLUCOSE SERPL-MCNC: 317 MG/DL (ref 70–110)
GLUCOSE SERPL-MCNC: 323 MG/DL (ref 70–110)
HCT VFR BLD AUTO: 37.9 % (ref 40–54)
HGB BLD-MCNC: 12.5 G/DL (ref 14–18)
IMM GRANULOCYTES # BLD AUTO: 0.1 K/UL (ref 0–0.04)
IMM GRANULOCYTES NFR BLD AUTO: 0.6 % (ref 0–0.5)
LYMPHOCYTES # BLD AUTO: 1.3 K/UL (ref 1–4.8)
LYMPHOCYTES NFR BLD: 8.4 % (ref 18–48)
MAGNESIUM SERPL-MCNC: 2.1 MG/DL (ref 1.6–2.6)
MCH RBC QN AUTO: 31.2 PG (ref 27–31)
MCHC RBC AUTO-ENTMCNC: 33 G/DL (ref 32–36)
MCV RBC AUTO: 95 FL (ref 82–98)
MONOCYTES # BLD AUTO: 0.5 K/UL (ref 0.3–1)
MONOCYTES NFR BLD: 3.4 % (ref 4–15)
NEUTROPHILS # BLD AUTO: 13.8 K/UL (ref 1.8–7.7)
NEUTROPHILS NFR BLD: 87.4 % (ref 38–73)
NRBC BLD-RTO: 0 /100 WBC
PHOSPHATE SERPL-MCNC: 6.4 MG/DL (ref 2.7–4.5)
PHOSPHATE SERPL-MCNC: 6.4 MG/DL (ref 2.7–4.5)
PHOSPHATE SERPL-MCNC: 6.8 MG/DL (ref 2.7–4.5)
PLATELET # BLD AUTO: 336 K/UL (ref 150–450)
PMV BLD AUTO: 10.7 FL (ref 9.2–12.9)
POCT GLUCOSE: 249 MG/DL (ref 70–110)
POCT GLUCOSE: 287 MG/DL (ref 70–110)
POCT GLUCOSE: 299 MG/DL (ref 70–110)
POTASSIUM SERPL-SCNC: 4.6 MMOL/L (ref 3.5–5.1)
POTASSIUM SERPL-SCNC: 4.7 MMOL/L (ref 3.5–5.1)
POTASSIUM SERPL-SCNC: 4.7 MMOL/L (ref 3.5–5.1)
RBC # BLD AUTO: 4.01 M/UL (ref 4.6–6.2)
SODIUM SERPL-SCNC: 124 MMOL/L (ref 136–145)
SODIUM SERPL-SCNC: 125 MMOL/L (ref 136–145)
SODIUM SERPL-SCNC: 125 MMOL/L (ref 136–145)
TACROLIMUS BLD-MCNC: <2 NG/ML (ref 5–15)
WBC # BLD AUTO: 15.78 K/UL (ref 3.9–12.7)

## 2021-12-26 PROCEDURE — 83735 ASSAY OF MAGNESIUM: CPT | Performed by: NURSE PRACTITIONER

## 2021-12-26 PROCEDURE — 80197 ASSAY OF TACROLIMUS: CPT | Performed by: NURSE PRACTITIONER

## 2021-12-26 PROCEDURE — 20600001 HC STEP DOWN PRIVATE ROOM

## 2021-12-26 PROCEDURE — 63600175 PHARM REV CODE 636 W HCPCS: Performed by: NURSE PRACTITIONER

## 2021-12-26 PROCEDURE — 99222 1ST HOSP IP/OBS MODERATE 55: CPT | Mod: ,,, | Performed by: NURSE PRACTITIONER

## 2021-12-26 PROCEDURE — 80069 RENAL FUNCTION PANEL: CPT | Performed by: NURSE PRACTITIONER

## 2021-12-26 PROCEDURE — 36415 COLL VENOUS BLD VENIPUNCTURE: CPT | Performed by: NURSE PRACTITIONER

## 2021-12-26 PROCEDURE — 85025 COMPLETE CBC W/AUTO DIFF WBC: CPT | Performed by: NURSE PRACTITIONER

## 2021-12-26 PROCEDURE — 25000003 PHARM REV CODE 250: Performed by: STUDENT IN AN ORGANIZED HEALTH CARE EDUCATION/TRAINING PROGRAM

## 2021-12-26 PROCEDURE — 63600175 PHARM REV CODE 636 W HCPCS: Performed by: STUDENT IN AN ORGANIZED HEALTH CARE EDUCATION/TRAINING PROGRAM

## 2021-12-26 PROCEDURE — 25000003 PHARM REV CODE 250: Performed by: NURSE PRACTITIONER

## 2021-12-26 PROCEDURE — 99222 PR INITIAL HOSPITAL CARE,LEVL II: ICD-10-PCS | Mod: ,,, | Performed by: NURSE PRACTITIONER

## 2021-12-26 RX ORDER — ONDANSETRON 2 MG/ML
4 INJECTION INTRAMUSCULAR; INTRAVENOUS ONCE AS NEEDED
Status: DISCONTINUED | OUTPATIENT
Start: 2021-12-26 | End: 2021-12-28 | Stop reason: HOSPADM

## 2021-12-26 RX ORDER — IBUPROFEN 200 MG
16 TABLET ORAL
Status: DISCONTINUED | OUTPATIENT
Start: 2021-12-26 | End: 2021-12-28 | Stop reason: HOSPADM

## 2021-12-26 RX ORDER — SULFAMETHOXAZOLE AND TRIMETHOPRIM 400; 80 MG/1; MG/1
1 TABLET ORAL EVERY MORNING
Status: DISCONTINUED | OUTPATIENT
Start: 2021-12-26 | End: 2021-12-28 | Stop reason: HOSPADM

## 2021-12-26 RX ORDER — NYSTATIN 100000 [USP'U]/ML
500000 SUSPENSION ORAL
Status: DISCONTINUED | OUTPATIENT
Start: 2021-12-26 | End: 2021-12-26

## 2021-12-26 RX ORDER — POSACONAZOLE 100 MG/1
300 TABLET, DELAYED RELEASE ORAL DAILY
Status: DISCONTINUED | OUTPATIENT
Start: 2021-12-27 | End: 2021-12-28 | Stop reason: HOSPADM

## 2021-12-26 RX ORDER — IBUPROFEN 200 MG
24 TABLET ORAL
Status: DISCONTINUED | OUTPATIENT
Start: 2021-12-26 | End: 2021-12-28 | Stop reason: HOSPADM

## 2021-12-26 RX ORDER — GLUCAGON 1 MG
1 KIT INJECTION CONTINUOUS PRN
Status: DISCONTINUED | OUTPATIENT
Start: 2021-12-26 | End: 2021-12-26

## 2021-12-26 RX ORDER — SODIUM CHLORIDE 9 MG/ML
INJECTION, SOLUTION INTRAVENOUS CONTINUOUS
Status: ACTIVE | OUTPATIENT
Start: 2021-12-26 | End: 2021-12-27

## 2021-12-26 RX ORDER — DOCUSATE SODIUM 100 MG/1
100 CAPSULE, LIQUID FILLED ORAL 3 TIMES DAILY
Status: DISCONTINUED | OUTPATIENT
Start: 2021-12-26 | End: 2021-12-28 | Stop reason: HOSPADM

## 2021-12-26 RX ORDER — INSULIN ASPART 100 [IU]/ML
4 INJECTION, SOLUTION INTRAVENOUS; SUBCUTANEOUS
Status: DISCONTINUED | OUTPATIENT
Start: 2021-12-26 | End: 2021-12-26

## 2021-12-26 RX ORDER — TRAMADOL HYDROCHLORIDE 50 MG/1
50 TABLET ORAL EVERY 6 HOURS PRN
Status: DISCONTINUED | OUTPATIENT
Start: 2021-12-26 | End: 2021-12-28 | Stop reason: HOSPADM

## 2021-12-26 RX ORDER — MYCOPHENOLATE MOFETIL 250 MG/1
1000 CAPSULE ORAL 2 TIMES DAILY
Status: DISCONTINUED | OUTPATIENT
Start: 2021-12-26 | End: 2021-12-28 | Stop reason: HOSPADM

## 2021-12-26 RX ORDER — IBUPROFEN 200 MG
24 TABLET ORAL
Status: DISCONTINUED | OUTPATIENT
Start: 2021-12-26 | End: 2021-12-26

## 2021-12-26 RX ORDER — METHYLPREDNISOLONE SOD SUCC 125 MG
250 VIAL (EA) INJECTION ONCE
Status: DISCONTINUED | OUTPATIENT
Start: 2021-12-26 | End: 2021-12-26

## 2021-12-26 RX ORDER — INSULIN ASPART 100 [IU]/ML
1-10 INJECTION, SOLUTION INTRAVENOUS; SUBCUTANEOUS
Status: DISCONTINUED | OUTPATIENT
Start: 2021-12-26 | End: 2021-12-28 | Stop reason: HOSPADM

## 2021-12-26 RX ORDER — OXYCODONE HYDROCHLORIDE 5 MG/1
5 TABLET ORAL EVERY 6 HOURS PRN
Status: DISCONTINUED | OUTPATIENT
Start: 2021-12-26 | End: 2021-12-28 | Stop reason: HOSPADM

## 2021-12-26 RX ORDER — PREDNISONE 20 MG/1
20 TABLET ORAL DAILY
Status: DISCONTINUED | OUTPATIENT
Start: 2021-12-28 | End: 2021-12-28 | Stop reason: HOSPADM

## 2021-12-26 RX ORDER — TACROLIMUS 1 MG/1
3 CAPSULE ORAL 2 TIMES DAILY
Status: DISCONTINUED | OUTPATIENT
Start: 2021-12-26 | End: 2021-12-26

## 2021-12-26 RX ORDER — IBUPROFEN 200 MG
16 TABLET ORAL
Status: DISCONTINUED | OUTPATIENT
Start: 2021-12-26 | End: 2021-12-26

## 2021-12-26 RX ORDER — INSULIN ASPART 100 [IU]/ML
10 INJECTION, SOLUTION INTRAVENOUS; SUBCUTANEOUS
Status: DISCONTINUED | OUTPATIENT
Start: 2021-12-26 | End: 2021-12-27

## 2021-12-26 RX ORDER — MUPIROCIN 20 MG/G
1 OINTMENT TOPICAL 2 TIMES DAILY
Status: DISCONTINUED | OUTPATIENT
Start: 2021-12-26 | End: 2021-12-28 | Stop reason: HOSPADM

## 2021-12-26 RX ORDER — POSACONAZOLE 100 MG/1
300 TABLET, DELAYED RELEASE ORAL 2 TIMES DAILY
Status: COMPLETED | OUTPATIENT
Start: 2021-12-26 | End: 2021-12-26

## 2021-12-26 RX ORDER — INSULIN ASPART 100 [IU]/ML
8 INJECTION, SOLUTION INTRAVENOUS; SUBCUTANEOUS
Status: DISCONTINUED | OUTPATIENT
Start: 2021-12-26 | End: 2021-12-26

## 2021-12-26 RX ORDER — VALGANCICLOVIR 450 MG/1
450 TABLET, FILM COATED ORAL EVERY MORNING
Status: DISCONTINUED | OUTPATIENT
Start: 2022-01-04 | End: 2021-12-28 | Stop reason: HOSPADM

## 2021-12-26 RX ORDER — ACETAMINOPHEN 500 MG
1000 TABLET ORAL EVERY 8 HOURS
Status: DISPENSED | OUTPATIENT
Start: 2021-12-26 | End: 2021-12-28

## 2021-12-26 RX ORDER — GLUCAGON 1 MG
1 KIT INJECTION
Status: DISCONTINUED | OUTPATIENT
Start: 2021-12-26 | End: 2021-12-26

## 2021-12-26 RX ORDER — BISACODYL 5 MG
10 TABLET, DELAYED RELEASE (ENTERIC COATED) ORAL NIGHTLY
Status: DISCONTINUED | OUTPATIENT
Start: 2021-12-26 | End: 2021-12-28 | Stop reason: HOSPADM

## 2021-12-26 RX ORDER — GLUCAGON 1 MG
1 KIT INJECTION
Status: DISCONTINUED | OUTPATIENT
Start: 2021-12-26 | End: 2021-12-28 | Stop reason: HOSPADM

## 2021-12-26 RX ORDER — FAMOTIDINE 20 MG/1
20 TABLET, FILM COATED ORAL NIGHTLY
Status: DISCONTINUED | OUTPATIENT
Start: 2021-12-26 | End: 2021-12-26

## 2021-12-26 RX ORDER — SODIUM BICARBONATE 650 MG/1
1300 TABLET ORAL 3 TIMES DAILY
Status: DISCONTINUED | OUTPATIENT
Start: 2021-12-26 | End: 2021-12-28 | Stop reason: HOSPADM

## 2021-12-26 RX ORDER — TACROLIMUS 1 MG/1
1 CAPSULE ORAL 2 TIMES DAILY
Status: DISCONTINUED | OUTPATIENT
Start: 2021-12-26 | End: 2021-12-28 | Stop reason: HOSPADM

## 2021-12-26 RX ORDER — CEFAZOLIN SODIUM 2 G/50ML
2 SOLUTION INTRAVENOUS
Status: DISPENSED | OUTPATIENT
Start: 2021-12-26 | End: 2021-12-27

## 2021-12-26 RX ORDER — INSULIN ASPART 100 [IU]/ML
0-5 INJECTION, SOLUTION INTRAVENOUS; SUBCUTANEOUS
Status: DISCONTINUED | OUTPATIENT
Start: 2021-12-26 | End: 2021-12-26

## 2021-12-26 RX ORDER — INSULIN ASPART 100 [IU]/ML
1-10 INJECTION, SOLUTION INTRAVENOUS; SUBCUTANEOUS
Status: DISCONTINUED | OUTPATIENT
Start: 2021-12-26 | End: 2021-12-26

## 2021-12-26 RX ORDER — HEPARIN SODIUM 5000 [USP'U]/ML
5000 INJECTION, SOLUTION INTRAVENOUS; SUBCUTANEOUS EVERY 8 HOURS
Status: DISCONTINUED | OUTPATIENT
Start: 2021-12-26 | End: 2021-12-28 | Stop reason: HOSPADM

## 2021-12-26 RX ORDER — METHYLPREDNISOLONE SOD SUCC 125 MG
125 VIAL (EA) INJECTION ONCE
Status: COMPLETED | OUTPATIENT
Start: 2021-12-27 | End: 2021-12-27

## 2021-12-26 RX ADMIN — THERA TABS 1 TABLET: TAB at 09:12

## 2021-12-26 RX ADMIN — TRAMADOL HYDROCHLORIDE 50 MG: 50 TABLET ORAL at 09:12

## 2021-12-26 RX ADMIN — TACROLIMUS 3 MG: 1 CAPSULE ORAL at 09:12

## 2021-12-26 RX ADMIN — HEPARIN SODIUM 5000 UNITS: 5000 INJECTION INTRAVENOUS; SUBCUTANEOUS at 02:12

## 2021-12-26 RX ADMIN — MYCOPHENOLATE MOFETIL 1000 MG: 250 CAPSULE ORAL at 09:12

## 2021-12-26 RX ADMIN — INSULIN ASPART 3 UNITS: 100 INJECTION, SOLUTION INTRAVENOUS; SUBCUTANEOUS at 08:12

## 2021-12-26 RX ADMIN — DOCUSATE SODIUM 100 MG: 100 CAPSULE ORAL at 09:12

## 2021-12-26 RX ADMIN — INSULIN ASPART 6 UNITS: 100 INJECTION, SOLUTION INTRAVENOUS; SUBCUTANEOUS at 05:12

## 2021-12-26 RX ADMIN — TRAMADOL HYDROCHLORIDE 50 MG: 50 TABLET ORAL at 05:12

## 2021-12-26 RX ADMIN — OXYCODONE HYDROCHLORIDE 10 MG: 10 TABLET ORAL at 01:12

## 2021-12-26 RX ADMIN — MUPIROCIN 1 G: 20 OINTMENT TOPICAL at 09:12

## 2021-12-26 RX ADMIN — ACETAMINOPHEN 1000 MG: 500 TABLET ORAL at 09:12

## 2021-12-26 RX ADMIN — POSACONAZOLE 300 MG: 100 TABLET, DELAYED RELEASE ORAL at 09:12

## 2021-12-26 RX ADMIN — SODIUM BICARBONATE 650 MG TABLET 1300 MG: at 12:12

## 2021-12-26 RX ADMIN — INSULIN ASPART 4 UNITS: 100 INJECTION, SOLUTION INTRAVENOUS; SUBCUTANEOUS at 12:12

## 2021-12-26 RX ADMIN — TACROLIMUS 1 MG: 1 CAPSULE ORAL at 05:12

## 2021-12-26 RX ADMIN — SODIUM BICARBONATE 650 MG TABLET 1300 MG: at 02:12

## 2021-12-26 RX ADMIN — DEXTROSE: 50 INJECTION, SOLUTION INTRAVENOUS at 11:12

## 2021-12-26 RX ADMIN — SULFAMETHOXAZOLE AND TRIMETHOPRIM 1 TABLET: 400; 80 TABLET ORAL at 09:12

## 2021-12-26 RX ADMIN — NYSTATIN 500000 UNITS: 100000 SUSPENSION ORAL at 09:12

## 2021-12-26 RX ADMIN — BISACODYL 10 MG: 5 TABLET, COATED ORAL at 09:12

## 2021-12-26 RX ADMIN — TRAMADOL HYDROCHLORIDE 50 MG: 50 TABLET ORAL at 03:12

## 2021-12-26 RX ADMIN — DOCUSATE SODIUM 100 MG: 100 CAPSULE ORAL at 02:12

## 2021-12-26 RX ADMIN — INSULIN ASPART 10 UNITS: 100 INJECTION, SOLUTION INTRAVENOUS; SUBCUTANEOUS at 05:12

## 2021-12-26 RX ADMIN — HEPARIN SODIUM 5000 UNITS: 5000 INJECTION INTRAVENOUS; SUBCUTANEOUS at 09:12

## 2021-12-26 RX ADMIN — DEXTROSE 2 G: 50 INJECTION, SOLUTION INTRAVENOUS at 09:12

## 2021-12-26 RX ADMIN — OXYCODONE HYDROCHLORIDE 10 MG: 10 TABLET ORAL at 07:12

## 2021-12-26 RX ADMIN — SODIUM BICARBONATE 650 MG TABLET 1300 MG: at 09:12

## 2021-12-26 RX ADMIN — FAMOTIDINE 20 MG: 20 TABLET ORAL at 09:12

## 2021-12-26 RX ADMIN — POSACONAZOLE 300 MG: 100 TABLET, DELAYED RELEASE ORAL at 11:12

## 2021-12-26 RX ADMIN — ACETAMINOPHEN 1000 MG: 500 TABLET ORAL at 02:12

## 2021-12-27 PROBLEM — N18.6 ESRD (END STAGE RENAL DISEASE): Status: RESOLVED | Noted: 2021-06-01 | Resolved: 2021-12-27

## 2021-12-27 PROBLEM — Z79.60 LONG-TERM USE OF IMMUNOSUPPRESSANT MEDICATION: Status: ACTIVE | Noted: 2021-12-27

## 2021-12-27 LAB
ALBUMIN SERPL BCP-MCNC: 2.1 G/DL (ref 3.5–5.2)
ANION GAP SERPL CALC-SCNC: 11 MMOL/L (ref 8–16)
BASOPHILS # BLD AUTO: 0.01 K/UL (ref 0–0.2)
BASOPHILS NFR BLD: 0.1 % (ref 0–1.9)
BUN SERPL-MCNC: 46 MG/DL (ref 6–20)
CALCIUM SERPL-MCNC: 8 MG/DL (ref 8.7–10.5)
CHLORIDE SERPL-SCNC: 94 MMOL/L (ref 95–110)
CO2 SERPL-SCNC: 22 MMOL/L (ref 23–29)
CREAT SERPL-MCNC: 9 MG/DL (ref 0.5–1.4)
DIFFERENTIAL METHOD: ABNORMAL
EOSINOPHIL # BLD AUTO: 0 K/UL (ref 0–0.5)
EOSINOPHIL NFR BLD: 0 % (ref 0–8)
ERYTHROCYTE [DISTWIDTH] IN BLOOD BY AUTOMATED COUNT: 14.1 % (ref 11.5–14.5)
EST. GFR  (AFRICAN AMERICAN): 6.7 ML/MIN/1.73 M^2
EST. GFR  (NON AFRICAN AMERICAN): 5.8 ML/MIN/1.73 M^2
GLUCOSE SERPL-MCNC: 131 MG/DL (ref 70–110)
GLUCOSE SERPL-MCNC: 171 MG/DL (ref 70–110)
HBV CORE AB SERPL QL IA: NEGATIVE
HBV CORE IGM SERPL QL IA: NEGATIVE
HBV SURFACE AG SERPL QL IA: NEGATIVE
HCO3 UR-SCNC: 21.6 MMOL/L (ref 24–28)
HCT VFR BLD AUTO: 33.6 % (ref 40–54)
HCT VFR BLD CALC: 28 %PCV (ref 36–54)
HCV AB SERPL QL IA: NEGATIVE
HGB BLD-MCNC: 11 G/DL (ref 14–18)
HIV 1+2 AB+HIV1 P24 AG SERPL QL IA: NEGATIVE
IMM GRANULOCYTES # BLD AUTO: 0.1 K/UL (ref 0–0.04)
IMM GRANULOCYTES NFR BLD AUTO: 0.6 % (ref 0–0.5)
LYMPHOCYTES # BLD AUTO: 1.2 K/UL (ref 1–4.8)
LYMPHOCYTES NFR BLD: 7.4 % (ref 18–48)
MAGNESIUM SERPL-MCNC: 2.1 MG/DL (ref 1.6–2.6)
MCH RBC QN AUTO: 30.3 PG (ref 27–31)
MCHC RBC AUTO-ENTMCNC: 32.7 G/DL (ref 32–36)
MCV RBC AUTO: 93 FL (ref 82–98)
MONOCYTES # BLD AUTO: 0.6 K/UL (ref 0.3–1)
MONOCYTES NFR BLD: 3.5 % (ref 4–15)
NEUTROPHILS # BLD AUTO: 14.3 K/UL (ref 1.8–7.7)
NEUTROPHILS NFR BLD: 88.4 % (ref 38–73)
NRBC BLD-RTO: 0 /100 WBC
PCO2 BLDA: 39.8 MMHG (ref 35–45)
PH SMN: 7.34 [PH] (ref 7.35–7.45)
PHOSPHATE SERPL-MCNC: 7 MG/DL (ref 2.7–4.5)
PLATELET # BLD AUTO: 278 K/UL (ref 150–450)
PMV BLD AUTO: 10.5 FL (ref 9.2–12.9)
PO2 BLDA: 226 MMHG (ref 80–100)
POC BE: -4 MMOL/L
POC IONIZED CALCIUM: 1.03 MMOL/L (ref 1.06–1.42)
POC SATURATED O2: 100 % (ref 95–100)
POC TCO2: 23 MMOL/L (ref 23–27)
POCT GLUCOSE: 116 MG/DL (ref 70–110)
POCT GLUCOSE: 155 MG/DL (ref 70–110)
POCT GLUCOSE: 191 MG/DL (ref 70–110)
POCT GLUCOSE: 232 MG/DL (ref 70–110)
POTASSIUM BLD-SCNC: 3.9 MMOL/L (ref 3.5–5.1)
POTASSIUM SERPL-SCNC: 5.1 MMOL/L (ref 3.5–5.1)
RBC # BLD AUTO: 3.63 M/UL (ref 4.6–6.2)
SAMPLE: ABNORMAL
SARS-COV-2 RDRP RESP QL NAA+PROBE: NEGATIVE
SODIUM BLD-SCNC: 132 MMOL/L (ref 136–145)
SODIUM SERPL-SCNC: 127 MMOL/L (ref 136–145)
TACROLIMUS BLD-MCNC: 2 NG/ML (ref 5–15)
WBC # BLD AUTO: 16.14 K/UL (ref 3.9–12.7)

## 2021-12-27 PROCEDURE — 80197 ASSAY OF TACROLIMUS: CPT | Performed by: NURSE PRACTITIONER

## 2021-12-27 PROCEDURE — 63600175 PHARM REV CODE 636 W HCPCS: Performed by: NURSE PRACTITIONER

## 2021-12-27 PROCEDURE — 63600175 PHARM REV CODE 636 W HCPCS: Performed by: STUDENT IN AN ORGANIZED HEALTH CARE EDUCATION/TRAINING PROGRAM

## 2021-12-27 PROCEDURE — 99232 SBSQ HOSP IP/OBS MODERATE 35: CPT | Mod: ,,, | Performed by: NURSE PRACTITIONER

## 2021-12-27 PROCEDURE — 97530 THERAPEUTIC ACTIVITIES: CPT

## 2021-12-27 PROCEDURE — U0002 COVID-19 LAB TEST NON-CDC: HCPCS | Performed by: TRANSPLANT SURGERY

## 2021-12-27 PROCEDURE — 80069 RENAL FUNCTION PANEL: CPT | Performed by: NURSE PRACTITIONER

## 2021-12-27 PROCEDURE — 99233 PR SUBSEQUENT HOSPITAL CARE,LEVL III: ICD-10-PCS | Mod: ,,, | Performed by: NURSE PRACTITIONER

## 2021-12-27 PROCEDURE — C9399 UNCLASSIFIED DRUGS OR BIOLOG: HCPCS | Performed by: NURSE PRACTITIONER

## 2021-12-27 PROCEDURE — 25000003 PHARM REV CODE 250: Performed by: STUDENT IN AN ORGANIZED HEALTH CARE EDUCATION/TRAINING PROGRAM

## 2021-12-27 PROCEDURE — 25000003 PHARM REV CODE 250: Performed by: PHYSICIAN ASSISTANT

## 2021-12-27 PROCEDURE — 97161 PT EVAL LOW COMPLEX 20 MIN: CPT

## 2021-12-27 PROCEDURE — 83735 ASSAY OF MAGNESIUM: CPT | Performed by: NURSE PRACTITIONER

## 2021-12-27 PROCEDURE — 25000003 PHARM REV CODE 250: Performed by: NURSE PRACTITIONER

## 2021-12-27 PROCEDURE — 99233 SBSQ HOSP IP/OBS HIGH 50: CPT | Mod: ,,, | Performed by: NURSE PRACTITIONER

## 2021-12-27 PROCEDURE — 85025 COMPLETE CBC W/AUTO DIFF WBC: CPT | Performed by: NURSE PRACTITIONER

## 2021-12-27 PROCEDURE — 99232 PR SUBSEQUENT HOSPITAL CARE,LEVL II: ICD-10-PCS | Mod: ,,, | Performed by: NURSE PRACTITIONER

## 2021-12-27 PROCEDURE — 36415 COLL VENOUS BLD VENIPUNCTURE: CPT | Performed by: NURSE PRACTITIONER

## 2021-12-27 PROCEDURE — 20600001 HC STEP DOWN PRIVATE ROOM

## 2021-12-27 RX ORDER — VALGANCICLOVIR 450 MG/1
450 TABLET, FILM COATED ORAL EVERY MORNING
Qty: 30 TABLET | Refills: 2 | Status: SHIPPED | OUTPATIENT
Start: 2021-12-25 | End: 2021-12-28

## 2021-12-27 RX ORDER — SODIUM BICARBONATE 650 MG/1
1300 TABLET ORAL 3 TIMES DAILY
Qty: 180 TABLET | Refills: 11 | Status: SHIPPED | OUTPATIENT
Start: 2021-12-27 | End: 2022-02-07

## 2021-12-27 RX ORDER — MYCOPHENOLATE MOFETIL 250 MG/1
1000 CAPSULE ORAL 2 TIMES DAILY
Qty: 240 CAPSULE | Refills: 11 | Status: SHIPPED | OUTPATIENT
Start: 2021-12-27 | End: 2022-11-29 | Stop reason: SDUPTHER

## 2021-12-27 RX ORDER — BISACODYL 10 MG
10 SUPPOSITORY, RECTAL RECTAL DAILY PRN
Status: DISCONTINUED | OUTPATIENT
Start: 2021-12-27 | End: 2021-12-28 | Stop reason: HOSPADM

## 2021-12-27 RX ORDER — INSULIN ASPART 100 [IU]/ML
14 INJECTION, SOLUTION INTRAVENOUS; SUBCUTANEOUS
Status: DISCONTINUED | OUTPATIENT
Start: 2021-12-27 | End: 2021-12-28

## 2021-12-27 RX ORDER — SULFAMETHOXAZOLE AND TRIMETHOPRIM 400; 80 MG/1; MG/1
1 TABLET ORAL
Qty: 12 TABLET | Refills: 5 | Status: SHIPPED | OUTPATIENT
Start: 2021-12-27 | End: 2022-01-24

## 2021-12-27 RX ORDER — FUROSEMIDE 10 MG/ML
100 INJECTION INTRAMUSCULAR; INTRAVENOUS ONCE
Status: COMPLETED | OUTPATIENT
Start: 2021-12-27 | End: 2021-12-27

## 2021-12-27 RX ORDER — TACROLIMUS 0.5 MG/1
3 CAPSULE ORAL EVERY 12 HOURS
Qty: 360 CAPSULE | Refills: 11 | Status: SHIPPED | OUTPATIENT
Start: 2021-12-27 | End: 2021-12-28 | Stop reason: SDUPTHER

## 2021-12-27 RX ORDER — EPINEPHRINE 0.3 MG/.3ML
0.3 INJECTION SUBCUTANEOUS ONCE AS NEEDED
Status: CANCELLED | OUTPATIENT
Start: 2021-12-29

## 2021-12-27 RX ORDER — POLYETHYLENE GLYCOL 3350 17 G/17G
17 POWDER, FOR SOLUTION ORAL 2 TIMES DAILY
Status: DISCONTINUED | OUTPATIENT
Start: 2021-12-27 | End: 2021-12-28 | Stop reason: HOSPADM

## 2021-12-27 RX ORDER — CALCIUM ACETATE 667 MG/1
667 CAPSULE ORAL
Status: DISCONTINUED | OUTPATIENT
Start: 2021-12-27 | End: 2021-12-28 | Stop reason: HOSPADM

## 2021-12-27 RX ORDER — FAMOTIDINE 20 MG/1
20 TABLET, FILM COATED ORAL NIGHTLY
Qty: 30 TABLET | Refills: 0 | Status: SHIPPED | OUTPATIENT
Start: 2021-12-27 | End: 2022-02-07

## 2021-12-27 RX ORDER — PREDNISONE 5 MG/1
TABLET ORAL
Qty: 120 TABLET | Refills: 11 | Status: SHIPPED | OUTPATIENT
Start: 2021-12-27 | End: 2023-01-02 | Stop reason: SDUPTHER

## 2021-12-27 RX ORDER — DIPHENHYDRAMINE HYDROCHLORIDE 50 MG/ML
50 INJECTION INTRAMUSCULAR; INTRAVENOUS ONCE AS NEEDED
Status: CANCELLED | OUTPATIENT
Start: 2021-12-29

## 2021-12-27 RX ADMIN — INSULIN DETEMIR 12 UNITS: 100 INJECTION, SOLUTION SUBCUTANEOUS at 09:12

## 2021-12-27 RX ADMIN — METHYLPREDNISOLONE SODIUM SUCCINATE 125 MG: 125 INJECTION, POWDER, FOR SOLUTION INTRAMUSCULAR; INTRAVENOUS at 08:12

## 2021-12-27 RX ADMIN — BISACODYL 10 MG: 10 SUPPOSITORY RECTAL at 08:12

## 2021-12-27 RX ADMIN — INSULIN ASPART 14 UNITS: 100 INJECTION, SOLUTION INTRAVENOUS; SUBCUTANEOUS at 08:12

## 2021-12-27 RX ADMIN — HEPARIN SODIUM 5000 UNITS: 5000 INJECTION INTRAVENOUS; SUBCUTANEOUS at 06:12

## 2021-12-27 RX ADMIN — SULFAMETHOXAZOLE AND TRIMETHOPRIM 1 TABLET: 400; 80 TABLET ORAL at 07:12

## 2021-12-27 RX ADMIN — BISACODYL 10 MG: 5 TABLET, COATED ORAL at 08:12

## 2021-12-27 RX ADMIN — TRAMADOL HYDROCHLORIDE 50 MG: 50 TABLET ORAL at 02:12

## 2021-12-27 RX ADMIN — DOCUSATE SODIUM 100 MG: 100 CAPSULE ORAL at 08:12

## 2021-12-27 RX ADMIN — CALCIUM ACETATE 667 MG: 667 CAPSULE ORAL at 04:12

## 2021-12-27 RX ADMIN — DOCUSATE SODIUM 100 MG: 100 CAPSULE ORAL at 02:12

## 2021-12-27 RX ADMIN — MYCOPHENOLATE MOFETIL 1000 MG: 250 CAPSULE ORAL at 08:12

## 2021-12-27 RX ADMIN — TRAMADOL HYDROCHLORIDE 50 MG: 50 TABLET ORAL at 08:12

## 2021-12-27 RX ADMIN — POLYETHYLENE GLYCOL 3350 17 G: 17 POWDER, FOR SOLUTION ORAL at 09:12

## 2021-12-27 RX ADMIN — MYCOPHENOLATE MOFETIL 1000 MG: 250 CAPSULE ORAL at 09:12

## 2021-12-27 RX ADMIN — FAMOTIDINE 20 MG: 20 TABLET ORAL at 08:12

## 2021-12-27 RX ADMIN — POSACONAZOLE 300 MG: 100 TABLET, DELAYED RELEASE ORAL at 08:12

## 2021-12-27 RX ADMIN — ACETAMINOPHEN 1000 MG: 500 TABLET ORAL at 06:12

## 2021-12-27 RX ADMIN — MUPIROCIN 1 G: 20 OINTMENT TOPICAL at 08:12

## 2021-12-27 RX ADMIN — CALCIUM ACETATE 667 MG: 667 CAPSULE ORAL at 12:12

## 2021-12-27 RX ADMIN — ACETAMINOPHEN 1000 MG: 500 TABLET ORAL at 02:12

## 2021-12-27 RX ADMIN — INSULIN ASPART 14 UNITS: 100 INJECTION, SOLUTION INTRAVENOUS; SUBCUTANEOUS at 04:12

## 2021-12-27 RX ADMIN — TACROLIMUS 1 MG: 1 CAPSULE ORAL at 07:12

## 2021-12-27 RX ADMIN — SODIUM BICARBONATE 650 MG TABLET 1300 MG: at 02:12

## 2021-12-27 RX ADMIN — SODIUM BICARBONATE 650 MG TABLET 1300 MG: at 08:12

## 2021-12-27 RX ADMIN — HEPARIN SODIUM 5000 UNITS: 5000 INJECTION INTRAVENOUS; SUBCUTANEOUS at 02:12

## 2021-12-27 RX ADMIN — INSULIN ASPART 14 UNITS: 100 INJECTION, SOLUTION INTRAVENOUS; SUBCUTANEOUS at 12:12

## 2021-12-27 RX ADMIN — INSULIN ASPART 2 UNITS: 100 INJECTION, SOLUTION INTRAVENOUS; SUBCUTANEOUS at 08:12

## 2021-12-27 RX ADMIN — FUROSEMIDE 100 MG: 10 INJECTION, SOLUTION INTRAMUSCULAR; INTRAVENOUS at 10:12

## 2021-12-27 RX ADMIN — HEPARIN SODIUM 5000 UNITS: 5000 INJECTION INTRAVENOUS; SUBCUTANEOUS at 08:12

## 2021-12-27 RX ADMIN — TACROLIMUS 1 MG: 1 CAPSULE ORAL at 05:12

## 2021-12-27 RX ADMIN — INSULIN ASPART 2 UNITS: 100 INJECTION, SOLUTION INTRAVENOUS; SUBCUTANEOUS at 12:12

## 2021-12-27 RX ADMIN — INSULIN ASPART 2 UNITS: 100 INJECTION, SOLUTION INTRAVENOUS; SUBCUTANEOUS at 01:12

## 2021-12-27 RX ADMIN — THERA TABS 1 TABLET: TAB at 08:12

## 2021-12-28 ENCOUNTER — PATIENT MESSAGE (OUTPATIENT)
Dept: ENDOCRINOLOGY | Facility: HOSPITAL | Age: 56
End: 2021-12-28
Payer: MEDICARE

## 2021-12-28 VITALS
RESPIRATION RATE: 10 BRPM | DIASTOLIC BLOOD PRESSURE: 74 MMHG | WEIGHT: 181.69 LBS | HEIGHT: 72 IN | HEART RATE: 65 BPM | SYSTOLIC BLOOD PRESSURE: 155 MMHG | TEMPERATURE: 98 F | BODY MASS INDEX: 24.61 KG/M2 | OXYGEN SATURATION: 100 %

## 2021-12-28 DIAGNOSIS — Z79.60 LONG-TERM USE OF IMMUNOSUPPRESSANT MEDICATION: Primary | ICD-10-CM

## 2021-12-28 DIAGNOSIS — Z99.2 TYPE 2 DIABETES MELLITUS WITH CHRONIC KIDNEY DISEASE ON CHRONIC DIALYSIS, WITH LONG-TERM CURRENT USE OF INSULIN: Primary | ICD-10-CM

## 2021-12-28 DIAGNOSIS — E11.22 TYPE 2 DIABETES MELLITUS WITH CHRONIC KIDNEY DISEASE ON CHRONIC DIALYSIS, WITH LONG-TERM CURRENT USE OF INSULIN: ICD-10-CM

## 2021-12-28 DIAGNOSIS — Z99.2 TYPE 2 DIABETES MELLITUS WITH CHRONIC KIDNEY DISEASE ON CHRONIC DIALYSIS, WITH LONG-TERM CURRENT USE OF INSULIN: ICD-10-CM

## 2021-12-28 DIAGNOSIS — N18.6 TYPE 2 DIABETES MELLITUS WITH CHRONIC KIDNEY DISEASE ON CHRONIC DIALYSIS, WITH LONG-TERM CURRENT USE OF INSULIN: Primary | ICD-10-CM

## 2021-12-28 DIAGNOSIS — E11.22 TYPE 2 DIABETES MELLITUS WITH CHRONIC KIDNEY DISEASE ON CHRONIC DIALYSIS, WITH LONG-TERM CURRENT USE OF INSULIN: Primary | ICD-10-CM

## 2021-12-28 DIAGNOSIS — Z79.4 TYPE 2 DIABETES MELLITUS WITH CHRONIC KIDNEY DISEASE ON CHRONIC DIALYSIS, WITH LONG-TERM CURRENT USE OF INSULIN: Primary | ICD-10-CM

## 2021-12-28 DIAGNOSIS — Z79.4 TYPE 2 DIABETES MELLITUS WITH CHRONIC KIDNEY DISEASE ON CHRONIC DIALYSIS, WITH LONG-TERM CURRENT USE OF INSULIN: ICD-10-CM

## 2021-12-28 DIAGNOSIS — N18.6 TYPE 2 DIABETES MELLITUS WITH CHRONIC KIDNEY DISEASE ON CHRONIC DIALYSIS, WITH LONG-TERM CURRENT USE OF INSULIN: ICD-10-CM

## 2021-12-28 DIAGNOSIS — Z94.0 S/P KIDNEY TRANSPLANT: ICD-10-CM

## 2021-12-28 LAB
ALBUMIN SERPL BCP-MCNC: 2 G/DL (ref 3.5–5.2)
ANION GAP SERPL CALC-SCNC: 9 MMOL/L (ref 8–16)
B CELL RESULTS - XM ALLO: NEGATIVE
B CELL RESULTS - XM ALLO: NEGATIVE
B CELL RESULTS - XM AUTO: NEGATIVE
B MCS AVERAGE - XM ALLO: -6.3
B MCS AVERAGE - XM ALLO: 16.2
B MCS AVERAGE - XM AUTO: -4.5
BACTERIA FLD AEROBE CULT: NO GROWTH
BASOPHILS # BLD AUTO: 0.01 K/UL (ref 0–0.2)
BASOPHILS NFR BLD: 0.1 % (ref 0–1.9)
BUN SERPL-MCNC: 51 MG/DL (ref 6–20)
CALCIUM SERPL-MCNC: 7.9 MG/DL (ref 8.7–10.5)
CHLORIDE SERPL-SCNC: 97 MMOL/L (ref 95–110)
CLASS I ANTIBODIES - LUMINEX: NORMAL
CLASS I ANTIBODY COMMENTS - LUMINEX: NORMAL
CLASS II ANTIBODIES - LUMINEX: NEGATIVE
CO2 SERPL-SCNC: 21 MMOL/L (ref 23–29)
CPRA %: 8
CREAT SERPL-MCNC: 7.8 MG/DL (ref 0.5–1.4)
DIFFERENTIAL METHOD: ABNORMAL
DONOR MRN: NORMAL
DONOR MRN: NORMAL
EOSINOPHIL # BLD AUTO: 0 K/UL (ref 0–0.5)
EOSINOPHIL NFR BLD: 0 % (ref 0–8)
ERYTHROCYTE [DISTWIDTH] IN BLOOD BY AUTOMATED COUNT: 13.9 % (ref 11.5–14.5)
EST. GFR  (AFRICAN AMERICAN): 8 ML/MIN/1.73 M^2
EST. GFR  (NON AFRICAN AMERICAN): 6.9 ML/MIN/1.73 M^2
FXMAL TESTING DATE: NORMAL
FXMAL TESTING DATE: NORMAL
FXMAU TESTING DATE: NORMAL
GLUCOSE SERPL-MCNC: 104 MG/DL (ref 70–110)
GRAM STN SPEC: NORMAL
HCT VFR BLD AUTO: 33.6 % (ref 40–54)
HGB BLD-MCNC: 10.9 G/DL (ref 14–18)
HLA FLOW CROSSMATCH (ALLO) INTERPRETATION: NORMAL
HPRA INTERPRETATION: NORMAL
IMM GRANULOCYTES # BLD AUTO: 0.1 K/UL (ref 0–0.04)
IMM GRANULOCYTES NFR BLD AUTO: 0.7 % (ref 0–0.5)
LYMPHOCYTES # BLD AUTO: 1.2 K/UL (ref 1–4.8)
LYMPHOCYTES NFR BLD: 8.3 % (ref 18–48)
MAGNESIUM SERPL-MCNC: 2.3 MG/DL (ref 1.6–2.6)
MCH RBC QN AUTO: 30.8 PG (ref 27–31)
MCHC RBC AUTO-ENTMCNC: 32.4 G/DL (ref 32–36)
MCV RBC AUTO: 95 FL (ref 82–98)
MONOCYTES # BLD AUTO: 0.7 K/UL (ref 0.3–1)
MONOCYTES NFR BLD: 5 % (ref 4–15)
NEUTROPHILS # BLD AUTO: 12.5 K/UL (ref 1.8–7.7)
NEUTROPHILS NFR BLD: 85.9 % (ref 38–73)
NRBC BLD-RTO: 0 /100 WBC
PHOSPHATE SERPL-MCNC: 5.9 MG/DL (ref 2.7–4.5)
PLATELET # BLD AUTO: 229 K/UL (ref 150–450)
PMV BLD AUTO: 11.1 FL (ref 9.2–12.9)
POCT GLUCOSE: 110 MG/DL (ref 70–110)
POCT GLUCOSE: 171 MG/DL (ref 70–110)
POCT GLUCOSE: 185 MG/DL (ref 70–110)
POTASSIUM SERPL-SCNC: 4.2 MMOL/L (ref 3.5–5.1)
RBC # BLD AUTO: 3.54 M/UL (ref 4.6–6.2)
SERUM COLLECTION DT - LUMINEX CLASS I: NORMAL
SERUM COLLECTION DT - LUMINEX CLASS II: NORMAL
SERUM COLLECTION DT - XM ALLO: NORMAL
SERUM COLLECTION DT - XM ALLO: NORMAL
SERUM COLLECTION DT - XM AUTO: NORMAL
SODIUM SERPL-SCNC: 127 MMOL/L (ref 136–145)
SPCL1 TESTING DATE: NORMAL
SPCL2 TESTING DATE: NORMAL
SPLUA TESTING DATE: NORMAL
T CELL RESULTS - XM ALLO: NEGATIVE
T CELL RESULTS - XM ALLO: NEGATIVE
T CELL RESULTS - XM AUTO: NEGATIVE
T MCS AVERAGE - XM ALLO: 13.1
T MCS AVERAGE - XM ALLO: 23.1
T MCS AVERAGE - XM AUTO: -1.1
TACROLIMUS BLD-MCNC: 4.5 NG/ML (ref 5–15)
WBC # BLD AUTO: 14.49 K/UL (ref 3.9–12.7)

## 2021-12-28 PROCEDURE — 63600175 PHARM REV CODE 636 W HCPCS: Performed by: STUDENT IN AN ORGANIZED HEALTH CARE EDUCATION/TRAINING PROGRAM

## 2021-12-28 PROCEDURE — 25000003 PHARM REV CODE 250: Performed by: NURSE PRACTITIONER

## 2021-12-28 PROCEDURE — 83735 ASSAY OF MAGNESIUM: CPT | Performed by: NURSE PRACTITIONER

## 2021-12-28 PROCEDURE — 99239 HOSP IP/OBS DSCHRG MGMT >30: CPT | Mod: 24,,, | Performed by: NURSE PRACTITIONER

## 2021-12-28 PROCEDURE — 25000003 PHARM REV CODE 250: Performed by: STUDENT IN AN ORGANIZED HEALTH CARE EDUCATION/TRAINING PROGRAM

## 2021-12-28 PROCEDURE — 85025 COMPLETE CBC W/AUTO DIFF WBC: CPT | Performed by: NURSE PRACTITIONER

## 2021-12-28 PROCEDURE — 63600175 PHARM REV CODE 636 W HCPCS: Performed by: NURSE PRACTITIONER

## 2021-12-28 PROCEDURE — 99239 PR HOSPITAL DISCHARGE DAY,>30 MIN: ICD-10-PCS | Mod: 24,,, | Performed by: NURSE PRACTITIONER

## 2021-12-28 PROCEDURE — 99232 PR SUBSEQUENT HOSPITAL CARE,LEVL II: ICD-10-PCS | Mod: ,,, | Performed by: NURSE PRACTITIONER

## 2021-12-28 PROCEDURE — 36415 COLL VENOUS BLD VENIPUNCTURE: CPT | Performed by: NURSE PRACTITIONER

## 2021-12-28 PROCEDURE — 80069 RENAL FUNCTION PANEL: CPT | Performed by: NURSE PRACTITIONER

## 2021-12-28 PROCEDURE — 82306 VITAMIN D 25 HYDROXY: CPT | Performed by: NURSE PRACTITIONER

## 2021-12-28 PROCEDURE — 99232 SBSQ HOSP IP/OBS MODERATE 35: CPT | Mod: ,,, | Performed by: NURSE PRACTITIONER

## 2021-12-28 PROCEDURE — C9399 UNCLASSIFIED DRUGS OR BIOLOG: HCPCS | Performed by: NURSE PRACTITIONER

## 2021-12-28 PROCEDURE — 80197 ASSAY OF TACROLIMUS: CPT | Performed by: NURSE PRACTITIONER

## 2021-12-28 RX ORDER — TACROLIMUS 0.5 MG/1
1 CAPSULE ORAL EVERY 12 HOURS
Qty: 120 CAPSULE | Refills: 11 | Status: SHIPPED | OUTPATIENT
Start: 2021-12-28 | End: 2021-12-30

## 2021-12-28 RX ORDER — LANCETS
1 EACH MISCELLANEOUS 3 TIMES DAILY
Qty: 100 EACH | Refills: 11 | Status: SHIPPED | OUTPATIENT
Start: 2021-12-28

## 2021-12-28 RX ORDER — DOCUSATE SODIUM 100 MG/1
100 CAPSULE, LIQUID FILLED ORAL 3 TIMES DAILY PRN
Qty: 30 CAPSULE | Refills: 0 | Status: SHIPPED | OUTPATIENT
Start: 2021-12-28 | End: 2022-12-23

## 2021-12-28 RX ORDER — INSULIN PUMP SYRINGE, 3 ML
EACH MISCELLANEOUS
Qty: 1 EACH | Refills: 0 | Status: SHIPPED | OUTPATIENT
Start: 2021-12-28 | End: 2021-12-28

## 2021-12-28 RX ORDER — TRAMADOL HYDROCHLORIDE 50 MG/1
50 TABLET ORAL EVERY 6 HOURS PRN
Qty: 28 TABLET | Refills: 0 | Status: SHIPPED | OUTPATIENT
Start: 2021-12-28 | End: 2022-12-23

## 2021-12-28 RX ORDER — INSULIN LISPRO 100 [IU]/ML
10 INJECTION, SOLUTION INTRAVENOUS; SUBCUTANEOUS
Qty: 15 ML | Refills: 11 | Status: SHIPPED | OUTPATIENT
Start: 2021-12-28 | End: 2022-01-10

## 2021-12-28 RX ORDER — TRAMADOL HYDROCHLORIDE 50 MG/1
50 TABLET ORAL EVERY 6 HOURS PRN
Qty: 28 TABLET | Refills: 0 | Status: CANCELLED | OUTPATIENT
Start: 2021-12-28

## 2021-12-28 RX ORDER — PEN NEEDLE, DIABETIC 30 GX3/16"
1 NEEDLE, DISPOSABLE MISCELLANEOUS 3 TIMES DAILY
Qty: 100 EACH | Refills: 11 | Status: SHIPPED | OUTPATIENT
Start: 2021-12-28 | End: 2022-09-01 | Stop reason: DRUGHIGH

## 2021-12-28 RX ORDER — INSULIN ASPART 100 [IU]/ML
12 INJECTION, SOLUTION INTRAVENOUS; SUBCUTANEOUS
Status: DISCONTINUED | OUTPATIENT
Start: 2021-12-28 | End: 2021-12-28 | Stop reason: HOSPADM

## 2021-12-28 RX ORDER — VALGANCICLOVIR 450 MG/1
450 TABLET, FILM COATED ORAL
Qty: 12 TABLET | Refills: 2 | Status: SHIPPED | OUTPATIENT
Start: 2021-12-29 | End: 2022-01-24

## 2021-12-28 RX ORDER — INSULIN PUMP SYRINGE, 3 ML
EACH MISCELLANEOUS
Qty: 1 EACH | Refills: 0 | Status: SHIPPED | OUTPATIENT
Start: 2021-12-28

## 2021-12-28 RX ORDER — LANCETS
1 EACH MISCELLANEOUS 3 TIMES DAILY
Qty: 100 EACH | Refills: 11 | Status: SHIPPED | OUTPATIENT
Start: 2021-12-28 | End: 2021-12-28

## 2021-12-28 RX ORDER — POSACONAZOLE 100 MG/1
300 TABLET, DELAYED RELEASE ORAL DAILY
Qty: 81 TABLET | Refills: 0 | Status: SHIPPED | OUTPATIENT
Start: 2021-12-29 | End: 2022-01-25

## 2021-12-28 RX ADMIN — THERA TABS 1 TABLET: TAB at 08:12

## 2021-12-28 RX ADMIN — ACETAMINOPHEN 1000 MG: 500 TABLET ORAL at 06:12

## 2021-12-28 RX ADMIN — POSACONAZOLE 300 MG: 100 TABLET, DELAYED RELEASE ORAL at 09:12

## 2021-12-28 RX ADMIN — SODIUM BICARBONATE 650 MG TABLET 1300 MG: at 08:12

## 2021-12-28 RX ADMIN — POLYETHYLENE GLYCOL 3350 17 G: 17 POWDER, FOR SOLUTION ORAL at 08:12

## 2021-12-28 RX ADMIN — PREDNISONE 20 MG: 20 TABLET ORAL at 08:12

## 2021-12-28 RX ADMIN — MUPIROCIN 1 G: 20 OINTMENT TOPICAL at 08:12

## 2021-12-28 RX ADMIN — INSULIN DETEMIR 10 UNITS: 100 INJECTION, SOLUTION SUBCUTANEOUS at 09:12

## 2021-12-28 RX ADMIN — DOCUSATE SODIUM 100 MG: 100 CAPSULE ORAL at 08:12

## 2021-12-28 RX ADMIN — INSULIN ASPART 12 UNITS: 100 INJECTION, SOLUTION INTRAVENOUS; SUBCUTANEOUS at 11:12

## 2021-12-28 RX ADMIN — SULFAMETHOXAZOLE AND TRIMETHOPRIM 1 TABLET: 400; 80 TABLET ORAL at 08:12

## 2021-12-28 RX ADMIN — MYCOPHENOLATE MOFETIL 1000 MG: 250 CAPSULE ORAL at 08:12

## 2021-12-28 RX ADMIN — TACROLIMUS 1 MG: 1 CAPSULE ORAL at 08:12

## 2021-12-28 RX ADMIN — CALCIUM ACETATE 667 MG: 667 CAPSULE ORAL at 08:12

## 2021-12-28 RX ADMIN — CALCIUM ACETATE 667 MG: 667 CAPSULE ORAL at 11:12

## 2021-12-28 RX ADMIN — HEPARIN SODIUM 5000 UNITS: 5000 INJECTION INTRAVENOUS; SUBCUTANEOUS at 06:12

## 2021-12-29 ENCOUNTER — TELEPHONE (OUTPATIENT)
Dept: TRANSPLANT | Facility: CLINIC | Age: 56
End: 2021-12-29

## 2021-12-29 ENCOUNTER — TELEPHONE (OUTPATIENT)
Dept: TRANSPLANT | Facility: CLINIC | Age: 56
End: 2021-12-29
Payer: MEDICARE

## 2021-12-29 ENCOUNTER — CLINICAL SUPPORT (OUTPATIENT)
Dept: TRANSPLANT | Facility: CLINIC | Age: 56
End: 2021-12-29
Payer: MEDICARE

## 2021-12-29 ENCOUNTER — PATIENT MESSAGE (OUTPATIENT)
Dept: TRANSPLANT | Facility: CLINIC | Age: 56
End: 2021-12-29

## 2021-12-29 ENCOUNTER — PATIENT OUTREACH (OUTPATIENT)
Dept: ADMINISTRATIVE | Facility: CLINIC | Age: 56
End: 2021-12-29
Payer: MEDICARE

## 2021-12-29 ENCOUNTER — INFUSION (OUTPATIENT)
Dept: INFECTIOUS DISEASES | Facility: HOSPITAL | Age: 56
End: 2021-12-29
Attending: INTERNAL MEDICINE
Payer: MEDICARE

## 2021-12-29 ENCOUNTER — TELEPHONE (OUTPATIENT)
Dept: ENDOCRINOLOGY | Facility: HOSPITAL | Age: 56
End: 2021-12-29
Payer: MEDICARE

## 2021-12-29 VITALS
HEART RATE: 90 BPM | DIASTOLIC BLOOD PRESSURE: 77 MMHG | OXYGEN SATURATION: 98 % | TEMPERATURE: 97 F | WEIGHT: 200.81 LBS | HEIGHT: 72 IN | SYSTOLIC BLOOD PRESSURE: 138 MMHG | RESPIRATION RATE: 16 BRPM | BODY MASS INDEX: 27.2 KG/M2

## 2021-12-29 DIAGNOSIS — Z94.0 S/P KIDNEY TRANSPLANT: Primary | ICD-10-CM

## 2021-12-29 LAB
HBV SURFACE AB SER QL IA: POSITIVE
HBV SURFACE AB SERPL IA-ACNC: 136 MIU/ML
HEPATITIS C VIRUS (HCV) RNA DETECTION/QUANTIFICATION RT-PCR: NORMAL IU/ML

## 2021-12-29 PROCEDURE — 25000003 PHARM REV CODE 250: Performed by: INTERNAL MEDICINE

## 2021-12-29 PROCEDURE — 99999 PR PBB SHADOW E&M-EST. PATIENT-LVL III: CPT | Mod: PBBFAC,,,

## 2021-12-29 PROCEDURE — 96365 THER/PROPH/DIAG IV INF INIT: CPT

## 2021-12-29 PROCEDURE — 99999 PR PBB SHADOW E&M-EST. PATIENT-LVL III: ICD-10-PCS | Mod: PBBFAC,,,

## 2021-12-29 PROCEDURE — 63600175 PHARM REV CODE 636 W HCPCS: Mod: JG | Performed by: INTERNAL MEDICINE

## 2021-12-29 PROCEDURE — 99213 OFFICE O/P EST LOW 20 MIN: CPT | Mod: PBBFAC,25

## 2021-12-29 RX ORDER — EPINEPHRINE 0.3 MG/.3ML
0.3 INJECTION SUBCUTANEOUS ONCE AS NEEDED
Status: DISCONTINUED | OUTPATIENT
Start: 2021-12-29 | End: 2021-12-29 | Stop reason: HOSPADM

## 2021-12-29 RX ORDER — DIPHENHYDRAMINE HYDROCHLORIDE 50 MG/ML
50 INJECTION INTRAMUSCULAR; INTRAVENOUS ONCE AS NEEDED
Status: CANCELLED | OUTPATIENT
Start: 2022-01-02

## 2021-12-29 RX ORDER — OXYCODONE HYDROCHLORIDE 5 MG/1
5 TABLET ORAL EVERY 4 HOURS PRN
Qty: 30 TABLET | Refills: 0 | Status: SHIPPED | OUTPATIENT
Start: 2021-12-29 | End: 2022-01-24

## 2021-12-29 RX ORDER — OXYCODONE HYDROCHLORIDE 5 MG/1
5 TABLET ORAL EVERY 4 HOURS PRN
Qty: 30 TABLET | Refills: 0 | OUTPATIENT
Start: 2021-12-29

## 2021-12-29 RX ORDER — EPINEPHRINE 0.3 MG/.3ML
0.3 INJECTION SUBCUTANEOUS ONCE AS NEEDED
Status: CANCELLED | OUTPATIENT
Start: 2022-01-02

## 2021-12-29 RX ORDER — DIPHENHYDRAMINE HYDROCHLORIDE 50 MG/ML
50 INJECTION INTRAMUSCULAR; INTRAVENOUS ONCE AS NEEDED
Status: DISCONTINUED | OUTPATIENT
Start: 2021-12-29 | End: 2021-12-29 | Stop reason: HOSPADM

## 2021-12-29 RX ADMIN — SODIUM CHLORIDE 20 MG: 9 INJECTION, SOLUTION INTRAVENOUS at 11:12

## 2021-12-29 NOTE — PROGRESS NOTES
Kidney Post-Transplant Assessment    Referring Physician: Héctor Calvillo  Current Nephrologist: Héctor Calvillo    ORGAN: RIGHT KIDNEY  Donor Type: donation after brain death  PHS Increased Risk: no  Cold Ischemia: 1,221 mins  Induction Medications: simulect - basiliximab    Subjective:     CC:  Reassessment of renal allograft function and management of chronic immunosuppression.    HPI:  Mr. Valdez is a 56 y.o. 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 His most recent creatinine is 6.3. He takes mycophenolate mofetil, prednisone and tacrolimus for maintenance immunosuppression. His post transplant course has been uncomplicated to date.    ESRD 2/2 DM. PD (cath removed intra-op), native uop ~180 ml/day. Patient is now s/p DBD KTX 12/25/21 (Simulect induction, CIT 20h 21m, KDPI 65%, CMV D+/R+). Per op note, small mass excised for biopsy at donor site and determined to be benign, but excision site left a 1.5-2cm wide superficial defect that caused expected bleeding after reperfusion that was unable to be sutured due to shape and risk of tearing parenchyma, therefore, evarrest patch applied with complete hemostasis    Hospitalization/ ED visits  None    Interval HX:  Reports doing well. Does complain of some discomfort to incisional site. No edema or induration on exam      Intake 1.5L  UOP 1.5L  BP 150s-170s/80s  Peripheral edema 1+  Weight stable  Appetite good  Wound glue closure. Looks good  fx assessment still feeling weak and fatigued.  Lab /diagnostic results reviewed with patient today.   All questions answered         Current Outpatient Medications:     blood sugar diagnostic Strp, 1 each by Misc.(Non-Drug; Combo Route) route 3 (three) times daily., Disp: 100 each, Rfl: 11    blood-glucose meter kit, Use as instructed, Disp: 1 each, Rfl: 0    cholecalciferol, vitamin D3, (VITAMIN D3) 50 mcg (2,000 unit) Tab, Take 2,000 Units by mouth once  "daily., Disp: , Rfl:     cyanocobalamin (VITAMIN B-12) 1000 MCG tablet, Take 1,000 mcg by mouth once daily., Disp: , Rfl:     docusate sodium (COLACE) 100 MG capsule, Take 1 capsule (100 mg total) by mouth 3 (three) times daily as needed for Constipation., Disp: 30 capsule, Rfl: 0    famotidine (PEPCID) 20 MG tablet, Take 1 tablet (20 mg total) by mouth every evening., Disp: 30 tablet, Rfl: 0    insulin lispro (HUMALOG KWIKPEN INSULIN) 100 unit/mL pen, Inject 10 Units into the skin 3 (three) times daily with meals. Plus sliding scale; TDD: 45 units, Disp: 15 mL, Rfl: 11    lancets Misc, 1 each by Misc.(Non-Drug; Combo Route) route 3 (three) times daily., Disp: 100 each, Rfl: 11    multivitamin Tab, Take 1 tablet by mouth once daily., Disp: , Rfl:     mycophenolate (CELLCEPT) 250 mg Cap, Take 4 capsules (1,000 mg total) by mouth 2 (two) times daily., Disp: 240 capsule, Rfl: 11    oxyCODONE (ROXICODONE) 5 MG immediate release tablet, Take 1 tablet (5 mg total) by mouth every 4 (four) hours as needed for Pain., Disp: 30 tablet, Rfl: 0    pen needle, diabetic (EASY COMFORT PEN NEEDLES) 32 gauge x 5/32" Ndle, Inject 1 each into the skin 3 (three) times daily., Disp: 100 each, Rfl: 11    posaconazole (NOXAFIL) 100 mg TbEC tablet, Take 3 tablets (300 mg total) by mouth once daily. Stop 1/25/22 for 27 days, Disp: 81 tablet, Rfl: 0    predniSONE (DELTASONE) 5 MG tablet, Take 20 mg by mouth once daily from 12/28-1/27/22;  Take 15mg daily from 1/28-2/27;   Take 10mg daily from 2/28-3/27; Take 5 mg daily thereafter beginning 3/28/22 (Patient taking differently: Take 20 mg by mouth once daily from 12/28-1/27/22;  Take 15mg daily from 1/28-2/27;   Take 10mg daily from 2/28-3/27; Take 5 mg daily thereafter beginning 3/28/22), Disp: 120 tablet, Rfl: 11    sodium bicarbonate 650 MG tablet, Take 2 tablets (1,300 mg total) by mouth 3 (three) times daily., Disp: 180 tablet, Rfl: 11    sulfamethoxazole-trimethoprim 400-80mg " (BACTRIM,SEPTRA) 400-80 mg per tablet, Take 1 tablet by mouth every Mon, Wed, Fri. STOP 6/23/22, Disp: 12 tablet, Rfl: 5    traMADoL (ULTRAM) 50 mg tablet, Take 1 tablet (50 mg total) by mouth every 6 (six) hours as needed for Pain., Disp: 28 tablet, Rfl: 0    valGANciclovir (VALCYTE) 450 mg Tab, Take 1 tablet (450 mg total) by mouth every Mon, Wed, Fri. STOP 3/25/22, Disp: 12 tablet, Rfl: 2    insulin detemir U-100 (LEVEMIR FLEXTOUCH) 100 unit/mL (3 mL) SubQ InPn pen, Inject 8 Units into the skin once daily. (Patient not taking: Reported on 1/3/2022), Disp: 15 mL, Rfl: 11    k phos di & mono-sod phos mono (K-PHOS-NEUTRAL) 250 mg Tab, Take 1 tablet by mouth 2 (two) times a day., Disp: 60 each, Rfl: 11    NIFEdipine (PROCARDIA-XL) 30 MG (OSM) 24 hr tablet, Take 1 tablet (30 mg total) by mouth once daily., Disp: 30 tablet, Rfl: 11    tacrolimus (PROGRAF) 0.5 MG Cap, Take 3 capsules (1.5 mg total) by mouth every morning AND 3 capsules (1.5 mg total) every evening., Disp: 180 capsule, Rfl: 11      Past Medical History:   Diagnosis Date    Chronic pulmonary embolism 6/1/2021    Diabetes mellitus     Diabetic nephropathy associated with type 2 diabetes mellitus 6/1/2021    Disorder of kidney and ureter     ESRD (end stage renal disease) 6/1/2021    Essential hypertension 6/1/2021    GERD (gastroesophageal reflux disease)     Mixed hyperlipidemia 6/1/2021    Sleep apnea 6/1/2021         Review of Systems   Constitutional: Negative for appetite change, chills, fatigue and fever.   HENT: Negative for trouble swallowing.    Respiratory: Negative for cough, chest tightness, shortness of breath and wheezing.    Cardiovascular: Negative for chest pain, palpitations and leg swelling.   Gastrointestinal: Negative for abdominal pain, constipation, diarrhea and nausea.   Genitourinary: Negative for difficulty urinating, frequency and urgency.   Musculoskeletal: Negative for arthralgias and myalgias.   Skin: Negative  for rash.   Allergic/Immunologic: Positive for immunocompromised state.   Neurological: Negative for dizziness, weakness, light-headedness and headaches.   Psychiatric/Behavioral: Negative for sleep disturbance.       Objective:   Blood pressure (!) 195/91, pulse 79, temperature 97.3 °F (36.3 °C), temperature source Tympanic, resp. rate 16, height 6' (1.829 m), weight 92.3 kg (203 lb 7.8 oz), SpO2 99 %.body mass index is 27.6 kg/m².    Physical Exam  Constitutional:       General: He is not in acute distress.     Appearance: He is well-developed and well-nourished. He is not diaphoretic.   Cardiovascular:      Rate and Rhythm: Normal rate and regular rhythm.      Heart sounds: Normal heart sounds. No murmur heard.  No friction rub. No gallop.    Pulmonary:      Effort: Pulmonary effort is normal. No respiratory distress.      Breath sounds: Normal breath sounds. No wheezing or rales.   Abdominal:      General: Bowel sounds are normal. There is no distension.      Palpations: Abdomen is soft.      Tenderness: There is no abdominal tenderness.   Musculoskeletal:         General: No tenderness or edema. Normal range of motion.   Skin:     General: Skin is warm and dry.      Findings: No rash.      Nails: There is no clubbing or cyanosis.          Neurological:      Mental Status: He is alert and oriented to person, place, and time.   Psychiatric:         Mood and Affect: Mood and affect normal.         Behavior: Behavior normal.         Labs:  Lab Results   Component Value Date    WBC 11.60 01/03/2022    HGB 12.0 (L) 01/03/2022    HCT 38.1 (L) 01/03/2022     01/03/2022    K 4.3 01/03/2022     01/03/2022    CO2 27 01/03/2022    BUN 24 (H) 01/03/2022    CREATININE 1.5 (H) 01/03/2022    EGFRNONAA 50.9 (A) 01/03/2022    CALCIUM 8.6 (L) 01/03/2022    PHOS 1.5 (L) 01/03/2022    MG 2.0 01/03/2022    ALBUMIN 2.6 (L) 01/03/2022    AST 14 12/24/2021    ALT 15 12/24/2021    .5 (H) 12/24/2021    TACROLIMUS 6.7  01/03/2022       No results found for: EXTANC, EXTWBC, EXTSEGS, EXTPLATELETS, EXTHEMOGLOBI, EXTHEMATOCRI, EXTCREATININ, EXTSODIUM, EXTPOTASSIUM, EXTBUN, EXTCO2, EXTCALCIUM, EXTPHOSPHORU, EXTGLUCOSE, EXTALBUMIN, EXTAST, EXTALT, EXTBILITOTAL, EXTLIPASE, EXTAMYLASE    No results found for: EXTCYCLOSLVL, EXTSIROLIMUS, EXTTACROLVL, EXTPROTCRE, EXTPTHINTACT, EXTPROTEINUA, EXTWBCUA, EXTRBCUA    Labs were reviewed with the patient    Assessment:     1. S/P kidney transplant    2. Type 2 diabetes mellitus with chronic kidney disease, with long-term current use of insulin, unspecified CKD stage    3. Secondary hyperparathyroidism of renal origin    4. Long-term use of immunosuppressant medication    5. Essential hypertension    6. Mixed hyperlipidemia        Plan:   Increase Tac to 1.5/1.5  Start Kphos  Start Nifedipine for HTN        1. CKD stage: will continue follow up as per our center guidelines. patient to continue close follow up with the local General nephrologist. Education provided in appropriate fluid intake, potassium intake. Continue with oral hydration.      2. Immunosuppression: Prograf trough 6.7, therapeutic target 8-10. Continue Prograf 1.5/1.5, MMF 1000 Mg BID, and Prednisone taper  Lab Results   Component Value Date    TACROLIMUS 6.7 01/03/2022    TACROLIMUS 5.6 12/30/2021    TACROLIMUS 5.8 12/29/2021     No results found for: CYCLOSPORINE  Will closely monitor for toxicities, education provided about adherence to medicines and need to communicate any side effect to the transplant nurse or physician.    3. Allograft Function:stable at baseline for the patient. Continue follow up as per our guidelines and with the local General nephrologist. Communication will be sent today.     1/3/2022  POD 9   Creatinine 0.5 - 1.4 mg/dL 1.5 (A)   eGFR if non African American >60 mL/min/1.73 m^2 50.9 (A)   Glucose 70 - 110 mg/dL 97       4. Hypertension management:  Continue with home blood pressure monitoring, low salt  and healthy life discussed with the patient.  BP Readings from Last 3 Encounters:   01/03/22 (!) 195/91   12/29/21 138/77   12/29/21 138/77       5. Metabolic Bone Disease/Secondary Hyperparathyroidism:calcium and phosphorus level discussed with the patient, patient will continue follow up with the general nephrologist for management of metabolic bone disease calcium and phosphorus as per our center protocol. Will monitor PTH, Vit D level, calcium.   Lab Results   Component Value Date    .5 (H) 12/24/2021    CALCIUM 8.6 (L) 01/03/2022    PHOS 1.5 (L) 01/03/2022    PHOS 3.0 12/30/2021    PHOS 4.2 12/29/2021       6. Electrolytes: reviewed with the patient, essentially within the normal range no need for acute changes today, will monitor as per our center guidelines.  Lab Results   Component Value Date     01/03/2022    K 4.3 01/03/2022     01/03/2022    CO2 27 01/03/2022    CO2 26 12/30/2021    CO2 24 12/29/2021       7. Anemia: will continue monitoring as per our center guidelines. No indication for acute intervention today.  Lab Results   Component Value Date    WBC 11.60 01/03/2022    HGB 12.0 (L) 01/03/2022    HCT 38.1 (L) 01/03/2022    MCV 97 01/03/2022     01/03/2022       8.Proteinuria: will continue with pr/cr ratio as per our center guidelines  No results found for: PROTEINURINE, CREATRANDUR, UTPCR     9. BK virus infection screening: will continue with urine or blood PCR as per our guidelines to prevent BK virus viremia and allograft dysfunction  No results found for: BKVIRUSDNAUR, BKQUANTURINE, BKVIRUSLOG, BKVIRUSURINE, BKVIRUSPCRQB      10. Weight education: provided during the clinic visit.  Body mass index is 27.6 kg/m².     11.Patient safety education regarding immunosuppression including prophylaxis posttransplant for CMV, PCP : Education provided about vaccination and prevention of infections.    12.  Cytopenias: no significant cytopenias will monitor as per our guidelines.  Medicine list reviewed including potential causes of drug-induced cytopenias  Lab Results   Component Value Date    WBC 11.60 01/03/2022    HGB 12.0 (L) 01/03/2022    HCT 38.1 (L) 01/03/2022    MCV 97 01/03/2022     01/03/2022       13. Post-transplant Prophylaxis; CMV Infection, PJP and Candida mucosistis and other indicated for this particular patient.   PCP PROPHYLAXIS: Bactrim until 6/25/22  CMV PROPHYLAXIS: Valganciclovir until 3/29/22   FUNGAL PROPHYLAXIS: Posaconazole until 1/25/22        Follow-up:   Clinic: return to transplant clinic weekly for the first month after transplant; every 2 weeks during months 2-3; then at 6-, 9-, 12-, 18-, 24-, and 36- months post-transplant to reassess for complications from immunosuppression toxicity and monitor for rejection.  Annually thereafter.    Labs: since patient remains at high risk for rejection and drug-related complications that warrant close monitoring, labs will be ordered as follows: continue twice weekly CBC, renal panel, and drug level for first month; then same labs once weekly through 3rd month post-transplant.  Urine for UA and protein/creatinine ratio monthly.  Serum BK - PCR at 1-, 3-, 6-, 9-, 12-, 18-, 24-, 36- 48-, and 60 months post-transplant.  Hepatic panel at 1-, 2-, 3-, 6-, 9-, 12-, 18-, 24-, and 36- months post-transplant.    Education:   Material provided to the patient.  Patient reminded to call with any health changes since these can be early signs of significant complications.  Also, I advised the patient to be sure any new medications or changes of old medications are discussed with either a pharmacist or physician knowledgeable with transplant to avoid rejection/drug toxicity related to significant drug interactions.    Exercise: reminded Casper of the importance of regular exercise for weight management, blood sugar and blood pressure management.  I also explained exercise has been shown to improve cardiovascular health, energy  level, and sleep hygiene.  Lastly, I advised him that cardiovascular complications are leading cause of death for renal transplant recipients, and regular exercise can help lower this risk.    I spoke with the patient for 30 minutes. More than half dedicated to counseling and education. All questions answered    Marva Juan NP-C  Transplant Nephrology     Follow-up:   Clinic: return to transplant clinic weekly for the first month after transplant; every 2 weeks during months 2-3; then at 6-, 9-, 12-, 18-, 24-, and 36- months post-transplant to reassess for complications from immunosuppression toxicity and monitor for rejection.  Annually thereafter.    Labs: since patient remains at high risk for rejection and drug-related complications that warrant close monitoring, labs will be ordered as follows: continue twice weekly CBC, renal panel, and drug level for first month; then same labs once weekly through 3rd month post-transplant.  Urine for UA and protein/creatinine ratio monthly.  Serum BK - PCR at 1-, 3-, 6-, 9-, 12-, 18-, 24-, 36-, 48-, and 60 months post-transplant.  Hepatic panel at 1-, 2-, 3-, 6-, 9-, 12-, 18-, 24-, and 36- months post-transplant.    Marva Juan NP       Education:   Material provided to the patient.  Patient reminded to call with any health changes since these can be early signs of significant complications.  Also, I advised the patient to be sure any new medications or changes of old medications are discussed with either a pharmacist or physician knowledgeable with transplant to avoid rejection/drug toxicity related to significant drug interactions.    Patient advised that it is recommended that all transplanted patients, and their close contacts and household members receive Covid vaccination.

## 2021-12-30 ENCOUNTER — DOCUMENTATION ONLY (OUTPATIENT)
Dept: TRANSPLANT | Facility: CLINIC | Age: 56
End: 2021-12-30
Payer: MEDICARE

## 2021-12-30 ENCOUNTER — PATIENT MESSAGE (OUTPATIENT)
Dept: TRANSPLANT | Facility: CLINIC | Age: 56
End: 2021-12-30
Payer: MEDICARE

## 2021-12-30 DIAGNOSIS — Z94.0 S/P KIDNEY TRANSPLANT: Primary | ICD-10-CM

## 2021-12-30 LAB — BACTERIA FLD CULT: NORMAL

## 2021-12-30 RX ORDER — TACROLIMUS 0.5 MG/1
CAPSULE ORAL
Qty: 150 CAPSULE | Refills: 11 | Status: SHIPPED | OUTPATIENT
Start: 2021-12-30 | End: 2022-01-03

## 2021-12-31 ENCOUNTER — PATIENT MESSAGE (OUTPATIENT)
Dept: TRANSPLANT | Facility: CLINIC | Age: 56
End: 2021-12-31
Payer: MEDICARE

## 2021-12-31 ENCOUNTER — NURSE TRIAGE (OUTPATIENT)
Dept: ADMINISTRATIVE | Facility: CLINIC | Age: 56
End: 2021-12-31
Payer: MEDICARE

## 2022-01-03 ENCOUNTER — OFFICE VISIT (OUTPATIENT)
Dept: TRANSPLANT | Facility: CLINIC | Age: 57
End: 2022-01-03
Payer: MEDICARE

## 2022-01-03 ENCOUNTER — TELEPHONE (OUTPATIENT)
Dept: TRANSPLANT | Facility: CLINIC | Age: 57
End: 2022-01-03

## 2022-01-03 ENCOUNTER — LAB VISIT (OUTPATIENT)
Dept: LAB | Facility: HOSPITAL | Age: 57
End: 2022-01-03
Attending: INTERNAL MEDICINE
Payer: MEDICARE

## 2022-01-03 VITALS
BODY MASS INDEX: 27.56 KG/M2 | RESPIRATION RATE: 16 BRPM | HEART RATE: 79 BPM | HEIGHT: 72 IN | TEMPERATURE: 97 F | SYSTOLIC BLOOD PRESSURE: 195 MMHG | WEIGHT: 203.5 LBS | OXYGEN SATURATION: 99 % | DIASTOLIC BLOOD PRESSURE: 91 MMHG

## 2022-01-03 DIAGNOSIS — I10 ESSENTIAL HYPERTENSION: ICD-10-CM

## 2022-01-03 DIAGNOSIS — Z94.0 S/P KIDNEY TRANSPLANT: Primary | ICD-10-CM

## 2022-01-03 DIAGNOSIS — Z79.4 TYPE 2 DIABETES MELLITUS WITH CHRONIC KIDNEY DISEASE, WITH LONG-TERM CURRENT USE OF INSULIN, UNSPECIFIED CKD STAGE: ICD-10-CM

## 2022-01-03 DIAGNOSIS — N25.81 SECONDARY HYPERPARATHYROIDISM OF RENAL ORIGIN: ICD-10-CM

## 2022-01-03 DIAGNOSIS — Z79.60 LONG-TERM USE OF IMMUNOSUPPRESSANT MEDICATION: ICD-10-CM

## 2022-01-03 DIAGNOSIS — E11.22 TYPE 2 DIABETES MELLITUS WITH CHRONIC KIDNEY DISEASE, WITH LONG-TERM CURRENT USE OF INSULIN, UNSPECIFIED CKD STAGE: ICD-10-CM

## 2022-01-03 DIAGNOSIS — E78.2 MIXED HYPERLIPIDEMIA: ICD-10-CM

## 2022-01-03 DIAGNOSIS — Z94.0 S/P KIDNEY TRANSPLANT: ICD-10-CM

## 2022-01-03 LAB
ALBUMIN SERPL BCP-MCNC: 2.6 G/DL (ref 3.5–5.2)
ANION GAP SERPL CALC-SCNC: 7 MMOL/L (ref 8–16)
BACTERIA SPEC ANAEROBE CULT: NORMAL
BASOPHILS # BLD AUTO: 0.01 K/UL (ref 0–0.2)
BASOPHILS NFR BLD: 0.1 % (ref 0–1.9)
BUN SERPL-MCNC: 24 MG/DL (ref 6–20)
CALCIUM SERPL-MCNC: 8.6 MG/DL (ref 8.7–10.5)
CHLORIDE SERPL-SCNC: 107 MMOL/L (ref 95–110)
CO2 SERPL-SCNC: 27 MMOL/L (ref 23–29)
CREAT SERPL-MCNC: 1.5 MG/DL (ref 0.5–1.4)
DIFFERENTIAL METHOD: ABNORMAL
EOSINOPHIL # BLD AUTO: 0.3 K/UL (ref 0–0.5)
EOSINOPHIL NFR BLD: 2.2 % (ref 0–8)
ERYTHROCYTE [DISTWIDTH] IN BLOOD BY AUTOMATED COUNT: 14.6 % (ref 11.5–14.5)
EST. GFR  (AFRICAN AMERICAN): 58.9 ML/MIN/1.73 M^2
EST. GFR  (NON AFRICAN AMERICAN): 50.9 ML/MIN/1.73 M^2
GLUCOSE SERPL-MCNC: 97 MG/DL (ref 70–110)
HCT VFR BLD AUTO: 38.1 % (ref 40–54)
HGB BLD-MCNC: 12 G/DL (ref 14–18)
IMM GRANULOCYTES # BLD AUTO: 0.04 K/UL (ref 0–0.04)
IMM GRANULOCYTES NFR BLD AUTO: 0.3 % (ref 0–0.5)
LYMPHOCYTES # BLD AUTO: 3.9 K/UL (ref 1–4.8)
LYMPHOCYTES NFR BLD: 33.2 % (ref 18–48)
MAGNESIUM SERPL-MCNC: 2 MG/DL (ref 1.6–2.6)
MCH RBC QN AUTO: 30.7 PG (ref 27–31)
MCHC RBC AUTO-ENTMCNC: 31.5 G/DL (ref 32–36)
MCV RBC AUTO: 97 FL (ref 82–98)
MONOCYTES # BLD AUTO: 1 K/UL (ref 0.3–1)
MONOCYTES NFR BLD: 8.4 % (ref 4–15)
NEUTROPHILS # BLD AUTO: 6.5 K/UL (ref 1.8–7.7)
NEUTROPHILS NFR BLD: 55.8 % (ref 38–73)
NRBC BLD-RTO: 0 /100 WBC
PHOSPHATE SERPL-MCNC: 1.5 MG/DL (ref 2.7–4.5)
PLATELET # BLD AUTO: 304 K/UL (ref 150–450)
PMV BLD AUTO: 10.9 FL (ref 9.2–12.9)
POTASSIUM SERPL-SCNC: 4.3 MMOL/L (ref 3.5–5.1)
RBC # BLD AUTO: 3.91 M/UL (ref 4.6–6.2)
SODIUM SERPL-SCNC: 141 MMOL/L (ref 136–145)
TACROLIMUS BLD-MCNC: 6.7 NG/ML (ref 5–15)
WBC # BLD AUTO: 11.6 K/UL (ref 3.9–12.7)

## 2022-01-03 PROCEDURE — 99999 PR PBB SHADOW E&M-EST. PATIENT-LVL V: CPT | Mod: PBBFAC,,, | Performed by: NURSE PRACTITIONER

## 2022-01-03 PROCEDURE — 80069 RENAL FUNCTION PANEL: CPT | Performed by: INTERNAL MEDICINE

## 2022-01-03 PROCEDURE — 85025 COMPLETE CBC W/AUTO DIFF WBC: CPT | Performed by: INTERNAL MEDICINE

## 2022-01-03 PROCEDURE — 99215 PR OFFICE/OUTPT VISIT, EST, LEVL V, 40-54 MIN: ICD-10-PCS | Mod: S$PBB,,, | Performed by: NURSE PRACTITIONER

## 2022-01-03 PROCEDURE — 83735 ASSAY OF MAGNESIUM: CPT | Performed by: INTERNAL MEDICINE

## 2022-01-03 PROCEDURE — 99215 OFFICE O/P EST HI 40 MIN: CPT | Mod: PBBFAC | Performed by: NURSE PRACTITIONER

## 2022-01-03 PROCEDURE — 99215 OFFICE O/P EST HI 40 MIN: CPT | Mod: S$PBB,,, | Performed by: NURSE PRACTITIONER

## 2022-01-03 PROCEDURE — 36415 COLL VENOUS BLD VENIPUNCTURE: CPT | Performed by: INTERNAL MEDICINE

## 2022-01-03 PROCEDURE — 80197 ASSAY OF TACROLIMUS: CPT | Performed by: INTERNAL MEDICINE

## 2022-01-03 PROCEDURE — 99999 PR PBB SHADOW E&M-EST. PATIENT-LVL V: ICD-10-PCS | Mod: PBBFAC,,, | Performed by: NURSE PRACTITIONER

## 2022-01-03 RX ORDER — NIFEDIPINE 30 MG/1
30 TABLET, EXTENDED RELEASE ORAL DAILY
Qty: 30 TABLET | Refills: 11 | Status: SHIPPED | OUTPATIENT
Start: 2022-01-03 | End: 2022-01-10

## 2022-01-03 RX ORDER — TACROLIMUS 0.5 MG/1
CAPSULE ORAL
Qty: 180 CAPSULE | Refills: 11 | Status: SHIPPED | OUTPATIENT
Start: 2022-01-03 | End: 2022-01-06

## 2022-01-03 NOTE — PROGRESS NOTES
POST TRANSPLANT CLINIC NOTE    ALANNA met with patient and caregiver cristy Powell in post clinic to follow up regarding any needs post transplant and assess coping after recent transplant. Pt is a transplant recipient from 12/25/2021. Patient reports he is doing well post transplant. Pt inquired about [employer] premium assistance for post transplanted patients. SW provided [employer] premium assistance reinstatement packet. SW advised he will need to contact dialysis SW for pin number to enter site. Pt verbalized understanding.  Patient and Caregiver expressed no psychosocial needs or concerns at this time, reports no issues with obtaining medications, and reports receiving medications from [facility]. Pt reports knowing how to contact SW team if any additional needs arise and SW remains available at [phone].

## 2022-01-03 NOTE — LETTER
January 3, 2022        Héctor Calvillo  [address]  [facility]  [place] [address]  Phone: [phone]  Fax: [phone]             [facility]- Transplant 1st Fl  [address]  [facility] [address]  Phone: [phone]   Patient: Antwon Valdez   MR Number: [id number]   YOB: 1965   Date of Visit: 1/3/2022       Dear Dr. Héctor Calvillo    Thank you for referring Antwon Valdez to me for evaluation. Attached you will find relevant portions of my assessment and plan of care.    If you have questions, please do not hesitate to call me. I look forward to following Antwon Valdez along with you.    Sincerely,    Marva Juan, NP    Enclosure    If you would like to receive this communication electronically, please contact [email or web address] or [phone] to request [email or web address] Link access.    [email or web address] Link is a tool which provides read-only access to select patient information with whom you have a relationship. Its easy to use and provides real time access to review your patients record including encounter summaries, notes, results, and demographic information.    If you feel you have received this communication in error or would no longer like to receive these types of communications, please e-mail [email or web address]

## 2022-01-03 NOTE — TELEPHONE ENCOUNTER
Spoke with pt regarding below. Pt verbalized understanding.----- Message from Guerline Goel MD sent at 1/3/2022  1:11 PM CST -----  Increase KPN to 2 tabs BID

## 2022-01-06 DIAGNOSIS — Z94.0 S/P KIDNEY TRANSPLANT: ICD-10-CM

## 2022-01-06 RX ORDER — TACROLIMUS 0.5 MG/1
CAPSULE ORAL
Qty: 150 CAPSULE | Refills: 11 | Status: SHIPPED | OUTPATIENT
Start: 2022-01-06 | End: 2022-01-17 | Stop reason: SDUPTHER

## 2022-01-06 NOTE — TELEPHONE ENCOUNTER
Spoke with pt regarding below. Pt states he is concerns about his leg swelling. States it is about the same as it was in clinic Monday. Also, BP has been 150-160. Spoke with Drake KENNEY and made appt for pt in clinic tomorrow.----- Message from Guerline Goel MD sent at 1/6/2022  1:20 PM CST -----  Lower tacro to 1.5 mg QAM and 1.0 mg nightly

## 2022-01-07 ENCOUNTER — OFFICE VISIT (OUTPATIENT)
Dept: TRANSPLANT | Facility: CLINIC | Age: 57
End: 2022-01-07
Payer: MEDICARE

## 2022-01-07 VITALS
RESPIRATION RATE: 16 BRPM | HEIGHT: 72 IN | TEMPERATURE: 97 F | BODY MASS INDEX: 27.62 KG/M2 | WEIGHT: 203.94 LBS | HEART RATE: 82 BPM | OXYGEN SATURATION: 100 % | SYSTOLIC BLOOD PRESSURE: 130 MMHG | DIASTOLIC BLOOD PRESSURE: 71 MMHG

## 2022-01-07 DIAGNOSIS — I10 ESSENTIAL HYPERTENSION: ICD-10-CM

## 2022-01-07 DIAGNOSIS — E11.22 TYPE 2 DIABETES MELLITUS WITH CHRONIC KIDNEY DISEASE, WITH LONG-TERM CURRENT USE OF INSULIN, UNSPECIFIED CKD STAGE: ICD-10-CM

## 2022-01-07 DIAGNOSIS — E11.21 DIABETIC NEPHROPATHY ASSOCIATED WITH TYPE 2 DIABETES MELLITUS: ICD-10-CM

## 2022-01-07 DIAGNOSIS — Z79.4 TYPE 2 DIABETES MELLITUS WITH CHRONIC KIDNEY DISEASE, WITH LONG-TERM CURRENT USE OF INSULIN, UNSPECIFIED CKD STAGE: ICD-10-CM

## 2022-01-07 DIAGNOSIS — Z94.0 S/P KIDNEY TRANSPLANT: Primary | ICD-10-CM

## 2022-01-07 DIAGNOSIS — Z79.60 LONG-TERM USE OF IMMUNOSUPPRESSANT MEDICATION: ICD-10-CM

## 2022-01-07 PROCEDURE — 99215 PR OFFICE/OUTPT VISIT, EST, LEVL V, 40-54 MIN: ICD-10-PCS | Mod: S$PBB,,, | Performed by: NURSE PRACTITIONER

## 2022-01-07 PROCEDURE — 99215 OFFICE O/P EST HI 40 MIN: CPT | Mod: PBBFAC | Performed by: NURSE PRACTITIONER

## 2022-01-07 PROCEDURE — 99999 PR PBB SHADOW E&M-EST. PATIENT-LVL V: CPT | Mod: PBBFAC,,, | Performed by: NURSE PRACTITIONER

## 2022-01-07 PROCEDURE — 99999 PR PBB SHADOW E&M-EST. PATIENT-LVL V: ICD-10-PCS | Mod: PBBFAC,,, | Performed by: NURSE PRACTITIONER

## 2022-01-07 PROCEDURE — 99215 OFFICE O/P EST HI 40 MIN: CPT | Mod: S$PBB,,, | Performed by: NURSE PRACTITIONER

## 2022-01-07 RX ORDER — CLONIDINE HYDROCHLORIDE 0.1 MG/1
0.1 TABLET ORAL 2 TIMES DAILY PRN
Qty: 60 TABLET | Refills: 1 | Status: SHIPPED | OUTPATIENT
Start: 2022-01-07 | End: 2022-02-07

## 2022-01-07 RX ORDER — FUROSEMIDE 20 MG/1
20 TABLET ORAL DAILY
Qty: 3 TABLET | Refills: 0 | Status: SHIPPED | OUTPATIENT
Start: 2022-01-07 | End: 2022-01-10 | Stop reason: SDUPTHER

## 2022-01-07 RX ORDER — PANTOPRAZOLE SODIUM 40 MG/1
40 TABLET, DELAYED RELEASE ORAL DAILY
Qty: 30 TABLET | Refills: 11 | Status: SHIPPED | OUTPATIENT
Start: 2022-01-07 | End: 2023-09-08

## 2022-01-07 NOTE — LETTER
January 7, 2022        Héctor Calvillo  [address]  [facility]  [place] [address]  Phone: [phone]  Fax: [phone]             [facility]- Transplant 1st Fl  [address]  [facility] [address]  Phone: [phone]   Patient: Antwon Valdez   MR Number: [id number]   YOB: 1965   Date of Visit: 1/7/2022       Dear Dr. Héctor Calvillo    Thank you for referring Antwon Valdez to me for evaluation. Attached you will find relevant portions of my assessment and plan of care.    If you have questions, please do not hesitate to call me. I look forward to following Antwon Valdez along with you.    Sincerely,    Marva Juan, NP    Enclosure    If you would like to receive this communication electronically, please contact [email or web address] or [phone] to request [employer] Link access.    [employer] is a tool which provides read-only access to select patient information with whom you have a relationship. Its easy to use and provides real time access to review your patients record including encounter summaries, notes, results, and demographic information.    If you feel you have received this communication in error or would no longer like to receive these types of communications, please e-mail [email or web address]

## 2022-01-07 NOTE — PROGRESS NOTES
Kidney Post-Transplant Assessment    Referring Physician: Héctor Calvillo  Current Nephrologist: Héctor Calvillo    ORGAN: RIGHT KIDNEY  Donor Type: donation after brain death  PHS Increased Risk: no  Cold Ischemia: 1,221 mins  Induction Medications: simulect - basiliximab    Subjective:     CC:  Reassessment of renal allograft function and management of chronic immunosuppression.    HPI:  Mr. Valdez is a 56 y.o. 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 His most recent creatinine is 6.3. He takes mycophenolate mofetil, prednisone and tacrolimus for maintenance immunosuppression. His post transplant course has been uncomplicated to date.    ESRD 2/2 DM. PD (cath removed intra-op), native uop ~180 ml/day. Patient is now s/p DBD KTX 12/25/21 (Simulect induction, CIT 20h 21m, KDPI 65%, CMV D+/R+). Per op note, small mass excised for biopsy at donor site and determined to be benign, but excision site left a 1.5-2cm wide superficial defect that caused expected bleeding after reperfusion that was unable to be sutured due to shape and risk of tearing parenchyma, therefore, evarrest patch applied with complete hemostasis    Hospitalization/ ED visits  None    Interval HX:  Presented to since for HTN and edema. On exam patient with 2+ edema and HTN without improvement since Monday.      Intake 1.5L  UOP 1.5L  BP 150s-170s/80s  Peripheral edema 2+ increased from Monday  Weight stable  Appetite good  Wound glue closure. Looks good  fx assessment still feeling weak and fatigued.  Lab /diagnostic results reviewed with patient today.   All questions answered         Current Outpatient Medications:     blood sugar diagnostic Strp, 1 each by Misc.(Non-Drug; Combo Route) route 3 (three) times daily., Disp: 100 each, Rfl: 11    blood-glucose meter kit, Use as instructed, Disp: 1 each, Rfl: 0    cholecalciferol, vitamin D3, (VITAMIN D3) 50 mcg (2,000 unit) Tab, Take  "2,000 Units by mouth once daily., Disp: , Rfl:     cyanocobalamin (VITAMIN B-12) 1000 MCG tablet, Take 1,000 mcg by mouth once daily., Disp: , Rfl:     docusate sodium (COLACE) 100 MG capsule, Take 1 capsule (100 mg total) by mouth 3 (three) times daily as needed for Constipation., Disp: 30 capsule, Rfl: 0    famotidine (PEPCID) 20 MG tablet, Take 1 tablet (20 mg total) by mouth every evening., Disp: 30 tablet, Rfl: 0    insulin detemir U-100 (LEVEMIR FLEXTOUCH) 100 unit/mL (3 mL) SubQ InPn pen, Inject 8 Units into the skin once daily., Disp: 15 mL, Rfl: 11    insulin lispro (HUMALOG KWIKPEN INSULIN) 100 unit/mL pen, Inject 10 Units into the skin 3 (three) times daily with meals. Plus sliding scale; TDD: 45 units, Disp: 15 mL, Rfl: 11    k phos di & mono-sod phos mono (K-PHOS-NEUTRAL) 250 mg Tab, Take 2 tablets by mouth 2 (two) times a day., Disp: 120 tablet, Rfl: 11    lancets Misc, 1 each by Misc.(Non-Drug; Combo Route) route 3 (three) times daily., Disp: 100 each, Rfl: 11    multivitamin Tab, Take 1 tablet by mouth once daily., Disp: , Rfl:     mycophenolate (CELLCEPT) 250 mg Cap, Take 4 capsules (1,000 mg total) by mouth 2 (two) times daily., Disp: 240 capsule, Rfl: 11    NIFEdipine (PROCARDIA-XL) 30 MG (OSM) 24 hr tablet, Take 1 tablet (30 mg total) by mouth once daily., Disp: 30 tablet, Rfl: 11    oxyCODONE (ROXICODONE) 5 MG immediate release tablet, Take 1 tablet (5 mg total) by mouth every 4 (four) hours as needed for Pain., Disp: 30 tablet, Rfl: 0    pen needle, diabetic (EASY COMFORT PEN NEEDLES) 32 gauge x 5/32" Ndle, Inject 1 each into the skin 3 (three) times daily., Disp: 100 each, Rfl: 11    posaconazole (NOXAFIL) 100 mg TbEC tablet, Take 3 tablets (300 mg total) by mouth once daily. Stop 1/25/22 for 27 days, Disp: 81 tablet, Rfl: 0    predniSONE (DELTASONE) 5 MG tablet, Take 20 mg by mouth once daily from 12/28-1/27/22;  Take 15mg daily from 1/28-2/27;   Take 10mg daily from 2/28-3/27; " Take 5 mg daily thereafter beginning 3/28/22 (Patient taking differently: Take 20 mg by mouth once daily from 12/28-1/27/22;  Take 15mg daily from 1/28-2/27;   Take 10mg daily from 2/28-3/27; Take 5 mg daily thereafter beginning 3/28/22), Disp: 120 tablet, Rfl: 11    sodium bicarbonate 650 MG tablet, Take 2 tablets (1,300 mg total) by mouth 3 (three) times daily., Disp: 180 tablet, Rfl: 11    sulfamethoxazole-trimethoprim 400-80mg (BACTRIM,SEPTRA) 400-80 mg per tablet, Take 1 tablet by mouth every Mon, Wed, Fri. STOP 6/23/22, Disp: 12 tablet, Rfl: 5    tacrolimus (PROGRAF) 0.5 MG Cap, Take 3 capsules (1.5 mg total) by mouth every morning AND 2 capsules (1 mg total) every evening., Disp: 150 capsule, Rfl: 11    traMADoL (ULTRAM) 50 mg tablet, Take 1 tablet (50 mg total) by mouth every 6 (six) hours as needed for Pain., Disp: 28 tablet, Rfl: 0    valGANciclovir (VALCYTE) 450 mg Tab, Take 1 tablet (450 mg total) by mouth every Mon, Wed, Fri. STOP 3/25/22, Disp: 12 tablet, Rfl: 2    cloNIDine (CATAPRES) 0.1 MG tablet, Take 1 tablet (0.1 mg total) by mouth 2 (two) times daily as needed (for SBP (blood pressure top number) > 170)., Disp: 60 tablet, Rfl: 1    furosemide (LASIX) 20 MG tablet, Take 1 tablet (20 mg total) by mouth once daily., Disp: 3 tablet, Rfl: 0    pantoprazole (PROTONIX) 40 MG tablet, Take 1 tablet (40 mg total) by mouth once daily., Disp: 30 tablet, Rfl: 11      Past Medical History:   Diagnosis Date    Chronic pulmonary embolism 6/1/2021    Diabetes mellitus     Diabetic nephropathy associated with type 2 diabetes mellitus 6/1/2021    Disorder of kidney and ureter     ESRD (end stage renal disease) 6/1/2021    Essential hypertension 6/1/2021    GERD (gastroesophageal reflux disease)     Mixed hyperlipidemia 6/1/2021    Sleep apnea 6/1/2021         Review of Systems   Constitutional: Negative for appetite change, chills, fatigue and fever.   HENT: Negative for trouble swallowing.     Respiratory: Negative for cough, chest tightness, shortness of breath and wheezing.    Cardiovascular: Negative for chest pain, palpitations and leg swelling.   Gastrointestinal: Negative for abdominal pain, constipation, diarrhea and nausea.   Genitourinary: Negative for difficulty urinating, frequency and urgency.   Musculoskeletal: Negative for arthralgias and myalgias.   Skin: Negative for rash.   Allergic/Immunologic: Positive for immunocompromised state.   Neurological: Negative for dizziness, weakness, light-headedness and headaches.   Psychiatric/Behavioral: Negative for sleep disturbance.       Objective:   Blood pressure 130/71, pulse 82, temperature 97.2 °F (36.2 °C), temperature source Tympanic, resp. rate 16, height 6' (1.829 m), weight 92.5 kg (203 lb 14.8 oz), SpO2 100 %.body mass index is 27.66 kg/m².    Physical Exam  Constitutional:       General: He is not in acute distress.     Appearance: He is well-developed. He is not diaphoretic.   Cardiovascular:      Rate and Rhythm: Normal rate and regular rhythm.      Heart sounds: Normal heart sounds. No murmur heard.  No friction rub. No gallop.    Pulmonary:      Effort: Pulmonary effort is normal. No respiratory distress.      Breath sounds: Normal breath sounds. No wheezing or rales.   Abdominal:      General: Bowel sounds are normal. There is no distension.      Palpations: Abdomen is soft.      Tenderness: There is no abdominal tenderness.   Musculoskeletal:         General: No tenderness. Normal range of motion.   Skin:     General: Skin is warm and dry.      Findings: No rash.      Nails: There is no clubbing.          Neurological:      Mental Status: He is alert and oriented to person, place, and time.   Psychiatric:         Behavior: Behavior normal.         Labs:  Lab Results   Component Value Date    WBC 10.65 01/06/2022    HGB 11.4 (L) 01/06/2022    HCT 35.3 (L) 01/06/2022     01/06/2022    K 4.0 01/06/2022     01/06/2022     CO2 27 01/06/2022    BUN 17 01/06/2022    CREATININE 1.4 01/06/2022    EGFRNONAA 55.4 (A) 01/06/2022    CALCIUM 8.2 (L) 01/06/2022    PHOS 2.5 (L) 01/06/2022    MG 1.7 01/06/2022    ALBUMIN 2.6 (L) 01/06/2022    AST 14 12/24/2021    ALT 15 12/24/2021    .5 (H) 12/24/2021    TACROLIMUS 9.1 01/06/2022       No results found for: EXTANC, EXTWBC, EXTSEGS, EXTPLATELETS, EXTHEMOGLOBI, EXTHEMATOCRI, EXTCREATININ, EXTSODIUM, EXTPOTASSIUM, EXTBUN, EXTCO2, EXTCALCIUM, EXTPHOSPHORU, EXTGLUCOSE, EXTALBUMIN, EXTAST, EXTALT, EXTBILITOTAL, EXTLIPASE, EXTAMYLASE    No results found for: EXTCYCLOSLVL, EXTSIROLIMUS, EXTTACROLVL, EXTPROTCRE, EXTPTHINTACT, EXTPROTEINUA, EXTWBCUA, EXTRBCUA    Labs were reviewed with the patient    Assessment:     1. S/P kidney transplant    2. Diabetic nephropathy associated with type 2 diabetes mellitus    3. Essential hypertension    4. Type 2 diabetes mellitus with chronic kidney disease, with long-term current use of insulin, unspecified CKD stage    5. Long-term use of immunosuppressant medication        Plan:   Continue Nifedipine  Lasix 20mg daily x3 days. Elevated and compression sock/hose. Low Na diet  Clonidine prn for SBP > 170  Resume Protonix with serve heartburn and was taking prior to transplant      1. CKD stage: will continue follow up as per our center guidelines. patient to continue close follow up with the local General nephrologist. Education provided in appropriate fluid intake, potassium intake. Continue with oral hydration.      2. Immunosuppression: Prograf trough 9.1, therapeutic target 8-10. Continue Prograf 1.5/1.5, MMF 1000 Mg BID, and Prednisone taper  Lab Results   Component Value Date    TACROLIMUS 9.1 01/06/2022    TACROLIMUS 6.7 01/03/2022    TACROLIMUS 5.6 12/30/2021     No results found for: CYCLOSPORINE  Will closely monitor for toxicities, education provided about adherence to medicines and need to communicate any side effect to the transplant nurse or  physician.    3. Allograft Function:stable at baseline for the patient. Continue follow up as per our guidelines and with the local General nephrologist. Communication will be sent today.      1/6/2022  POD 12   Creatinine 0.5 - 1.4 mg/dL 1.4   eGFR if non African American >60 mL/min/1.73 m^2 55.4 (A)   Glucose 70 - 110 mg/dL 95       4. Hypertension management:  Continue with home blood pressure monitoring, low salt and healthy life discussed with the patient.  BP Readings from Last 3 Encounters:   01/07/22 130/71   01/03/22 (!) 195/91   12/29/21 138/77       5. Metabolic Bone Disease/Secondary Hyperparathyroidism:calcium and phosphorus level discussed with the patient, patient will continue follow up with the general nephrologist for management of metabolic bone disease calcium and phosphorus as per our center protocol. Will monitor PTH, Vit D level, calcium.   Lab Results   Component Value Date    .5 (H) 12/24/2021    CALCIUM 8.2 (L) 01/06/2022    PHOS 2.5 (L) 01/06/2022    PHOS 1.5 (L) 01/03/2022    PHOS 3.0 12/30/2021       6. Electrolytes: reviewed with the patient, essentially within the normal range no need for acute changes today, will monitor as per our center guidelines.  Lab Results   Component Value Date     01/06/2022    K 4.0 01/06/2022     01/06/2022    CO2 27 01/06/2022    CO2 27 01/03/2022    CO2 26 12/30/2021       7. Anemia: will continue monitoring as per our center guidelines. No indication for acute intervention today.  Lab Results   Component Value Date    WBC 10.65 01/06/2022    HGB 11.4 (L) 01/06/2022    HCT 35.3 (L) 01/06/2022    MCV 97 01/06/2022     01/06/2022       8.Proteinuria: will continue with pr/cr ratio as per our center guidelines  No results found for: PROTEINURINE, CREATRANDUR, UTPCR     9. BK virus infection screening: will continue with urine or blood PCR as per our guidelines to prevent BK virus viremia and allograft dysfunction  No results found  for: BKVIRUSDNAUR, BKQUANTURINE, BKVIRUSLOG, BKVIRUSURINE, BKVIRUSPCRQB      10. Weight education: provided during the clinic visit.  Body mass index is 27.66 kg/m².     11.Patient safety education regarding immunosuppression including prophylaxis posttransplant for CMV, PCP : Education provided about vaccination and prevention of infections.    12.  Cytopenias: no significant cytopenias will monitor as per our guidelines. Medicine list reviewed including potential causes of drug-induced cytopenias  Lab Results   Component Value Date    WBC 10.65 01/06/2022    HGB 11.4 (L) 01/06/2022    HCT 35.3 (L) 01/06/2022    MCV 97 01/06/2022     01/06/2022       13. Post-transplant Prophylaxis; CMV Infection, PJP and Candida mucosistis and other indicated for this particular patient.   PCP PROPHYLAXIS: Bactrim until 6/25/22  CMV PROPHYLAXIS: Valganciclovir until 3/29/22   FUNGAL PROPHYLAXIS: Posaconazole until 1/25/22        Follow-up:   Clinic: return to transplant clinic weekly for the first month after transplant; every 2 weeks during months 2-3; then at 6-, 9-, 12-, 18-, 24-, and 36- months post-transplant to reassess for complications from immunosuppression toxicity and monitor for rejection.  Annually thereafter.    Labs: since patient remains at high risk for rejection and drug-related complications that warrant close monitoring, labs will be ordered as follows: continue twice weekly CBC, renal panel, and drug level for first month; then same labs once weekly through 3rd month post-transplant.  Urine for UA and protein/creatinine ratio monthly.  Serum BK - PCR at 1-, 3-, 6-, 9-, 12-, 18-, 24-, 36- 48-, and 60 months post-transplant.  Hepatic panel at 1-, 2-, 3-, 6-, 9-, 12-, 18-, 24-, and 36- months post-transplant.    Education:   Material provided to the patient.  Patient reminded to call with any health changes since these can be early signs of significant complications.  Also, I advised the patient to be sure  any new medications or changes of old medications are discussed with either a pharmacist or physician knowledgeable with transplant to avoid rejection/drug toxicity related to significant drug interactions.    Exercise: reminded Casper of the importance of regular exercise for weight management, blood sugar and blood pressure management.  I also explained exercise has been shown to improve cardiovascular health, energy level, and sleep hygiene.  Lastly, I advised him that cardiovascular complications are leading cause of death for renal transplant recipients, and regular exercise can help lower this risk.    I spoke with the patient for 30 minutes. More than half dedicated to counseling and education. All questions answered    Marva Juan NP-[facility]  Transplant Nephrology     Follow-up:   Clinic: return to transplant clinic weekly for the first month after transplant; every 2 weeks during months 2-3; then at 6-, 9-, 12-, 18-, 24-, and 36- months post-transplant to reassess for complications from immunosuppression toxicity and monitor for rejection.  Annually thereafter.    Labs: since patient remains at high risk for rejection and drug-related complications that warrant close monitoring, labs will be ordered as follows: continue twice weekly CBC, renal panel, and drug level for first month; then same labs once weekly through 3rd month post-transplant.  Urine for UA and protein/creatinine ratio monthly.  Serum BK - PCR at 1-, 3-, 6-, 9-, 12-, 18-, 24-, 36-, 48-, and 60 months post-transplant.  Hepatic panel at 1-, 2-, 3-, 6-, 9-, 12-, 18-, 24-, and 36- months post-transplant.    Marva Juan NP       Education:   Material provided to the patient.  Patient reminded to call with any health changes since these can be early signs of significant complications.  Also, I advised the patient to be sure any new medications or changes of old medications are discussed with either a pharmacist or physician knowledgeable with  transplant to avoid rejection/drug toxicity related to significant drug interactions.    Patient advised that it is recommended that all transplanted patients, and their close contacts and household members receive Covid vaccination.

## 2022-01-07 NOTE — PROGRESS NOTES
Kidney Post-Transplant Assessment    Referring Physician: Héctor Calvillo  Current Nephrologist: Héctor Calvillo    ORGAN: RIGHT KIDNEY  Donor Type: donation after brain death  PHS Increased Risk: no  Cold Ischemia: 1,221 mins  Induction Medications: simulect - basiliximab    Subjective:     CC:  Reassessment of renal allograft function and management of chronic immunosuppression.    HPI:  Mr. Valdez is a 56 y.o. 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 His most recent creatinine is 6.3. He takes mycophenolate mofetil, prednisone and tacrolimus for maintenance immunosuppression. His post transplant course has been uncomplicated to date.    ESRD 2/2 DM. PD (cath removed intra-op), native uop ~180 ml/day. Patient is now s/p DBD KTX 12/25/21 (Simulect induction, CIT 20h 21m, KDPI 65%, CMV D+/R+). Per op note, small mass excised for biopsy at donor site and determined to be benign, but excision site left a 1.5-2cm wide superficial defect that caused expected bleeding after reperfusion that was unable to be sutured due to shape and risk of tearing parenchyma, therefore, evarrest patch applied with complete hemostasis    Hospitalization/ ED visits  None    Interval HX:  BP mildly improved since Friday. SBP 1402-150s. Has not needed prn clonidine. Has seen mild improvement in edema per patient. UOP increase on day 2 and 3 of lasix use with some weight decrease. Patient is 200# in clinic and reports weight of 181# prior to transplant.     Intake 1.8L  UOP 2.2L  BP 140s-170ss/80s  Peripheral edema 2+   Weight stable  Appetite good  Wound glue closure. Looks good  fx assessment still feeling weak and fatigued.  Lab /diagnostic results reviewed with patient today.   All questions answered         Current Outpatient Medications:     blood sugar diagnostic Strp, 1 each by Misc.(Non-Drug; Combo Route) route 3 (three) times daily., Disp: 100 each, Rfl: 11     "blood-glucose meter kit, Use as instructed, Disp: 1 each, Rfl: 0    cholecalciferol, vitamin D3, (VITAMIN D3) 50 mcg (2,000 unit) Tab, Take 2,000 Units by mouth once daily., Disp: , Rfl:     cloNIDine (CATAPRES) 0.1 MG tablet, Take 1 tablet (0.1 mg total) by mouth 2 (two) times daily as needed (for SBP (blood pressure top number) > 170)., Disp: 60 tablet, Rfl: 1    cyanocobalamin (VITAMIN B-12) 1000 MCG tablet, Take 1,000 mcg by mouth once daily., Disp: , Rfl:     docusate sodium (COLACE) 100 MG capsule, Take 1 capsule (100 mg total) by mouth 3 (three) times daily as needed for Constipation., Disp: 30 capsule, Rfl: 0    famotidine (PEPCID) 20 MG tablet, Take 1 tablet (20 mg total) by mouth every evening., Disp: 30 tablet, Rfl: 0    insulin detemir U-100 (LEVEMIR FLEXTOUCH) 100 unit/mL (3 mL) SubQ InPn pen, Inject 8 Units into the skin once daily., Disp: 15 mL, Rfl: 11    insulin lispro (HUMALOG KWIKPEN INSULIN) 100 unit/mL pen, Inject 10 Units into the skin 3 (three) times daily with meals. Plus sliding scale; TDD: 45 units, Disp: 15 mL, Rfl: 11    k phos di & mono-sod phos mono (K-PHOS-NEUTRAL) 250 mg Tab, Take 2 tablets by mouth 2 (two) times a day., Disp: 120 tablet, Rfl: 11    lancets Misc, 1 each by Misc.(Non-Drug; Combo Route) route 3 (three) times daily., Disp: 100 each, Rfl: 11    multivitamin Tab, Take 1 tablet by mouth once daily., Disp: , Rfl:     mycophenolate (CELLCEPT) 250 mg Cap, Take 4 capsules (1,000 mg total) by mouth 2 (two) times daily., Disp: 240 capsule, Rfl: 11    oxyCODONE (ROXICODONE) 5 MG immediate release tablet, Take 1 tablet (5 mg total) by mouth every 4 (four) hours as needed for Pain., Disp: 30 tablet, Rfl: 0    pen needle, diabetic (EASY COMFORT PEN NEEDLES) 32 gauge x 5/32" Ndle, Inject 1 each into the skin 3 (three) times daily., Disp: 100 each, Rfl: 11    posaconazole (NOXAFIL) 100 mg TbEC tablet, Take 3 tablets (300 mg total) by mouth once daily. Stop 1/25/22 for 27 " days, Disp: 81 tablet, Rfl: 0    predniSONE (DELTASONE) 5 MG tablet, Take 20 mg by mouth once daily from 12/28-1/27/22;  Take 15mg daily from 1/28-2/27;   Take 10mg daily from 2/28-3/27; Take 5 mg daily thereafter beginning 3/28/22 (Patient taking differently: Take 20 mg by mouth once daily from 12/28-1/27/22;  Take 15mg daily from 1/28-2/27;   Take 10mg daily from 2/28-3/27; Take 5 mg daily thereafter beginning 3/28/22), Disp: 120 tablet, Rfl: 11    sodium bicarbonate 650 MG tablet, Take 2 tablets (1,300 mg total) by mouth 3 (three) times daily., Disp: 180 tablet, Rfl: 11    sulfamethoxazole-trimethoprim 400-80mg (BACTRIM,SEPTRA) 400-80 mg per tablet, Take 1 tablet by mouth every Mon, Wed, Fri. STOP 6/23/22, Disp: 12 tablet, Rfl: 5    tacrolimus (PROGRAF) 0.5 MG Cap, Take 3 capsules (1.5 mg total) by mouth every morning AND 2 capsules (1 mg total) every evening., Disp: 150 capsule, Rfl: 11    traMADoL (ULTRAM) 50 mg tablet, Take 1 tablet (50 mg total) by mouth every 6 (six) hours as needed for Pain., Disp: 28 tablet, Rfl: 0    valGANciclovir (VALCYTE) 450 mg Tab, Take 1 tablet (450 mg total) by mouth every Mon, Wed, Fri. STOP 3/25/22, Disp: 12 tablet, Rfl: 2    furosemide (LASIX) 20 MG tablet, Take 2 tablets (40 mg total) by mouth once daily. Take 2 tabs today and tomorrow then 1 daily, Disp: 6 tablet, Rfl: 0    NIFEdipine (PROCARDIA-XL) 60 MG (OSM) 24 hr tablet, Take 1 tablet (60 mg total) by mouth once daily., Disp: 30 tablet, Rfl: 11    pantoprazole (PROTONIX) 40 MG tablet, Take 1 tablet (40 mg total) by mouth once daily. (Patient not taking: Reported on 1/10/2022), Disp: 30 tablet, Rfl: 11      Past Medical History:   Diagnosis Date    Chronic pulmonary embolism 6/1/2021    Diabetes mellitus     Diabetic nephropathy associated with type 2 diabetes mellitus 6/1/2021    Disorder of kidney and ureter     ESRD (end stage renal disease) 6/1/2021    Essential hypertension 6/1/2021    GERD  (gastroesophageal reflux disease)     Mixed hyperlipidemia 6/1/2021    Sleep apnea 6/1/2021         Review of Systems   Constitutional: Negative for appetite change, chills, fatigue and fever.   HENT: Negative for trouble swallowing.    Respiratory: Negative for cough, chest tightness, shortness of breath and wheezing.    Cardiovascular: Negative for chest pain, palpitations and leg swelling.   Gastrointestinal: Negative for abdominal pain, constipation, diarrhea and nausea.   Genitourinary: Negative for difficulty urinating, frequency and urgency.   Musculoskeletal: Negative for arthralgias and myalgias.   Skin: Negative for rash.   Allergic/Immunologic: Positive for immunocompromised state.   Neurological: Negative for dizziness, weakness, light-headedness and headaches.   Psychiatric/Behavioral: Negative for sleep disturbance.       Objective:   Blood pressure (!) 153/88, pulse 79, temperature 97.7 °F (36.5 °C), temperature source Tympanic, resp. rate 16, height 6' (1.829 m), weight 90.8 kg (200 lb 2.8 oz), SpO2 100 %.body mass index is 27.15 kg/m².    Physical Exam  Constitutional:       General: He is not in acute distress.     Appearance: He is well-developed. He is not diaphoretic.   Cardiovascular:      Rate and Rhythm: Normal rate and regular rhythm.      Heart sounds: Normal heart sounds. No murmur heard.  No friction rub. No gallop.    Pulmonary:      Effort: Pulmonary effort is normal. No respiratory distress.      Breath sounds: Normal breath sounds. No wheezing or rales.   Abdominal:      General: Bowel sounds are normal. There is no distension.      Palpations: Abdomen is soft.      Tenderness: There is no abdominal tenderness.   Musculoskeletal:         General: No tenderness. Normal range of motion.   Skin:     General: Skin is warm and dry.      Findings: No rash.      Nails: There is no clubbing.          Neurological:      Mental Status: He is alert and oriented to person, place, and time.    Psychiatric:         Behavior: Behavior normal.         Labs:  Lab Results   Component Value Date    WBC 10.65 01/06/2022    HGB 11.4 (L) 01/06/2022    HCT 35.3 (L) 01/06/2022     01/06/2022    K 4.0 01/06/2022     01/06/2022    CO2 27 01/06/2022    BUN 17 01/06/2022    CREATININE 1.4 01/06/2022    EGFRNONAA 55.4 (A) 01/06/2022    CALCIUM 8.2 (L) 01/06/2022    PHOS 2.5 (L) 01/06/2022    MG 1.7 01/06/2022    ALBUMIN 2.6 (L) 01/06/2022    AST 14 12/24/2021    ALT 15 12/24/2021    .5 (H) 12/24/2021    TACROLIMUS 9.1 01/06/2022       No results found for: EXTANC, EXTWBC, EXTSEGS, EXTPLATELETS, EXTHEMOGLOBI, EXTHEMATOCRI, EXTCREATININ, EXTSODIUM, EXTPOTASSIUM, EXTBUN, EXTCO2, EXTCALCIUM, EXTPHOSPHORU, EXTGLUCOSE, EXTALBUMIN, EXTAST, EXTALT, EXTBILITOTAL, EXTLIPASE, EXTAMYLASE    No results found for: EXTCYCLOSLVL, EXTSIROLIMUS, EXTTACROLVL, EXTPROTCRE, EXTPTHINTACT, EXTPROTEINUA, EXTWBCUA, EXTRBCUA    Labs were reviewed with the patient    Assessment:     1. S/P kidney transplant    2. Long-term use of immunosuppressant medication    3. Type 2 diabetes mellitus with chronic kidney disease, with long-term current use of insulin, unspecified CKD stage    4. Diabetic nephropathy associated with type 2 diabetes mellitus    5. Essential hypertension        Plan:   Increased Nifedipine 60mg daily  Lasix 40mg daily x2 days. Then 20mg daily x2. Elevated and compression sock/hose. Low Na diet  Clonidine prn for SBP > 170  Pending today's labs    Addendum 1/10/22 1230 :  -WBC 15.26---afebrile, no s/s of infection. Could be secondary to steroid use. Patient educated on s/s infection and when to call coordinator  -K+ 3---likely due to lasix use. Patient instructed to take 40mEq x2 today then 20meq daily while on lasix    Needs labs redrawn on Thursday 1. CKD stage: will continue follow up as per our center guidelines. patient to continue close follow up with the local General nephrologist. Education  provided in appropriate fluid intake, potassium intake. Continue with oral hydration.      2. Immunosuppression: Prograf trough 9.1, therapeutic target 8-10. Continue Prograf 1.5/1.5, MMF 1000 Mg BID, and Prednisone taper  Lab Results   Component Value Date    TACROLIMUS 9.1 01/06/2022    TACROLIMUS 6.7 01/03/2022    TACROLIMUS 5.6 12/30/2021     No results found for: CYCLOSPORINE  Will closely monitor for toxicities, education provided about adherence to medicines and need to communicate any side effect to the transplant nurse or physician.    3. Allograft Function:stable at baseline for the patient. Continue follow up as per our guidelines and with the local General nephrologist. Communication will be sent today.      1/6/2022  POD 12   Creatinine 0.5 - 1.4 mg/dL 1.4   eGFR if non African American >60 mL/min/1.73 m^2 55.4 (A)   Glucose 70 - 110 mg/dL 95       4. Hypertension management:  Continue with home blood pressure monitoring, low salt and healthy life discussed with the patient.  BP Readings from Last 3 Encounters:   01/10/22 (!) 153/88   01/07/22 130/71   01/03/22 (!) 195/91       5. Metabolic Bone Disease/Secondary Hyperparathyroidism:calcium and phosphorus level discussed with the patient, patient will continue follow up with the general nephrologist for management of metabolic bone disease calcium and phosphorus as per our center protocol. Will monitor PTH, Vit D level, calcium.   Lab Results   Component Value Date    .5 (H) 12/24/2021    CALCIUM 8.2 (L) 01/06/2022    PHOS 2.5 (L) 01/06/2022    PHOS 1.5 (L) 01/03/2022    PHOS 3.0 12/30/2021       6. Electrolytes: reviewed with the patient, essentially within the normal range no need for acute changes today, will monitor as per our center guidelines.  Lab Results   Component Value Date     01/06/2022    K 4.0 01/06/2022     01/06/2022    CO2 27 01/06/2022    CO2 27 01/03/2022    CO2 26 12/30/2021       7. Anemia: will continue  monitoring as per our center guidelines. No indication for acute intervention today.  Lab Results   Component Value Date    WBC 10.65 01/06/2022    HGB 11.4 (L) 01/06/2022    HCT 35.3 (L) 01/06/2022    MCV 97 01/06/2022     01/06/2022       8.Proteinuria: will continue with pr/cr ratio as per our center guidelines  No results found for: PROTEINURINE, CREATRANDUR, UTPCR     9. BK virus infection screening: will continue with urine or blood PCR as per our guidelines to prevent BK virus viremia and allograft dysfunction  No results found for: BKVIRUSDNAUR, BKQUANTURINE, BKVIRUSLOG, BKVIRUSURINE, BKVIRUSPCRQB      10. Weight education: provided during the clinic visit.  Body mass index is 27.15 kg/m².     11.Patient safety education regarding immunosuppression including prophylaxis posttransplant for CMV, PCP : Education provided about vaccination and prevention of infections.    12.  Cytopenias: no significant cytopenias will monitor as per our guidelines. Medicine list reviewed including potential causes of drug-induced cytopenias  Lab Results   Component Value Date    WBC 10.65 01/06/2022    HGB 11.4 (L) 01/06/2022    HCT 35.3 (L) 01/06/2022    MCV 97 01/06/2022     01/06/2022       13. Post-transplant Prophylaxis; CMV Infection, PJP and Candida mucosistis and other indicated for this particular patient.   PCP PROPHYLAXIS: Bactrim until 6/25/22  CMV PROPHYLAXIS: Valganciclovir until 3/29/22   FUNGAL PROPHYLAXIS: Posaconazole until 1/25/22        Follow-up:   Clinic: return to transplant clinic weekly for the first month after transplant; every 2 weeks during months 2-3; then at 6-, 9-, 12-, 18-, 24-, and 36- months post-transplant to reassess for complications from immunosuppression toxicity and monitor for rejection.  Annually thereafter.    Labs: since patient remains at high risk for rejection and drug-related complications that warrant close monitoring, labs will be ordered as follows: continue  twice weekly CBC, renal panel, and drug level for first month; then same labs once weekly through 3rd month post-transplant.  Urine for UA and protein/creatinine ratio monthly.  Serum BK - PCR at 1-, 3-, 6-, 9-, 12-, 18-, 24-, 36- 48-, and 60 months post-transplant.  Hepatic panel at 1-, 2-, 3-, 6-, 9-, 12-, 18-, 24-, and 36- months post-transplant.    Education:   Material provided to the patient.  Patient reminded to call with any health changes since these can be early signs of significant complications.  Also, I advised the patient to be sure any new medications or changes of old medications are discussed with either a pharmacist or physician knowledgeable with transplant to avoid rejection/drug toxicity related to significant drug interactions.    Exercise: reminded Casper of the importance of regular exercise for weight management, blood sugar and blood pressure management.  I also explained exercise has been shown to improve cardiovascular health, energy level, and sleep hygiene.  Lastly, I advised him that cardiovascular complications are leading cause of death for renal transplant recipients, and regular exercise can help lower this risk.    I spoke with the patient for 30 minutes. More than half dedicated to counseling and education. All questions answered    PRISCILLA Childs  Transplant Nephrology     Follow-up:   Clinic: return to transplant clinic weekly for the first month after transplant; every 2 weeks during months 2-3; then at 6-, 9-, 12-, 18-, 24-, and 36- months post-transplant to reassess for complications from immunosuppression toxicity and monitor for rejection.  Annually thereafter.    Labs: since patient remains at high risk for rejection and drug-related complications that warrant close monitoring, labs will be ordered as follows: continue twice weekly CBC, renal panel, and drug level for first month; then same labs once weekly through 3rd month post-transplant.  Urine for UA and  protein/creatinine ratio monthly.  Serum BK - PCR at 1-, 3-, 6-, 9-, 12-, 18-, 24-, 36-, 48-, and 60 months post-transplant.  Hepatic panel at 1-, 2-, 3-, 6-, 9-, 12-, 18-, 24-, and 36- months post-transplant.    Marva Juan NP       Education:   Material provided to the patient.  Patient reminded to call with any health changes since these can be early signs of significant complications.  Also, I advised the patient to be sure any new medications or changes of old medications are discussed with either a pharmacist or physician knowledgeable with transplant to avoid rejection/drug toxicity related to significant drug interactions.    Patient advised that it is recommended that all transplanted patients, and their close contacts and household members receive Covid vaccination.

## 2022-01-10 ENCOUNTER — OFFICE VISIT (OUTPATIENT)
Dept: TRANSPLANT | Facility: CLINIC | Age: 57
End: 2022-01-10
Payer: MEDICARE

## 2022-01-10 ENCOUNTER — PATIENT MESSAGE (OUTPATIENT)
Dept: ENDOCRINOLOGY | Facility: HOSPITAL | Age: 57
End: 2022-01-10
Payer: MEDICARE

## 2022-01-10 ENCOUNTER — OFFICE VISIT (OUTPATIENT)
Dept: ENDOCRINOLOGY | Facility: CLINIC | Age: 57
End: 2022-01-10
Payer: MEDICARE

## 2022-01-10 VITALS
DIASTOLIC BLOOD PRESSURE: 58 MMHG | BODY MASS INDEX: 27.2 KG/M2 | WEIGHT: 200.81 LBS | HEIGHT: 72 IN | SYSTOLIC BLOOD PRESSURE: 130 MMHG

## 2022-01-10 VITALS
DIASTOLIC BLOOD PRESSURE: 88 MMHG | WEIGHT: 200.19 LBS | BODY MASS INDEX: 27.11 KG/M2 | TEMPERATURE: 98 F | SYSTOLIC BLOOD PRESSURE: 153 MMHG | HEART RATE: 79 BPM | OXYGEN SATURATION: 100 % | HEIGHT: 72 IN | RESPIRATION RATE: 16 BRPM

## 2022-01-10 DIAGNOSIS — T38.0X5A ADRENAL CORTICAL STEROIDS CAUSING ADVERSE EFFECT IN THERAPEUTIC USE: ICD-10-CM

## 2022-01-10 DIAGNOSIS — Z94.0 S/P KIDNEY TRANSPLANT: ICD-10-CM

## 2022-01-10 DIAGNOSIS — I10 ESSENTIAL HYPERTENSION: ICD-10-CM

## 2022-01-10 DIAGNOSIS — E11.22 TYPE 2 DIABETES MELLITUS WITH CHRONIC KIDNEY DISEASE, WITH LONG-TERM CURRENT USE OF INSULIN, UNSPECIFIED CKD STAGE: ICD-10-CM

## 2022-01-10 DIAGNOSIS — Z79.4 TYPE 2 DIABETES MELLITUS WITH CHRONIC KIDNEY DISEASE, WITH LONG-TERM CURRENT USE OF INSULIN, UNSPECIFIED CKD STAGE: ICD-10-CM

## 2022-01-10 DIAGNOSIS — E11.21 DIABETIC NEPHROPATHY ASSOCIATED WITH TYPE 2 DIABETES MELLITUS: ICD-10-CM

## 2022-01-10 DIAGNOSIS — Z79.60 LONG-TERM USE OF IMMUNOSUPPRESSANT MEDICATION: ICD-10-CM

## 2022-01-10 DIAGNOSIS — Z29.89 PROPHYLACTIC IMMUNOTHERAPY: ICD-10-CM

## 2022-01-10 DIAGNOSIS — Z94.0 S/P KIDNEY TRANSPLANT: Primary | ICD-10-CM

## 2022-01-10 PROCEDURE — 99999 PR PBB SHADOW E&M-EST. PATIENT-LVL V: CPT | Mod: PBBFAC,,, | Performed by: NURSE PRACTITIONER

## 2022-01-10 PROCEDURE — 99215 OFFICE O/P EST HI 40 MIN: CPT | Mod: S$PBB,,, | Performed by: NURSE PRACTITIONER

## 2022-01-10 PROCEDURE — 99213 OFFICE O/P EST LOW 20 MIN: CPT | Mod: S$PBB,,, | Performed by: INTERNAL MEDICINE

## 2022-01-10 PROCEDURE — 99999 PR PBB SHADOW E&M-EST. PATIENT-LVL V: ICD-10-PCS | Mod: PBBFAC,,, | Performed by: NURSE PRACTITIONER

## 2022-01-10 PROCEDURE — 99215 OFFICE O/P EST HI 40 MIN: CPT | Mod: PBBFAC | Performed by: NURSE PRACTITIONER

## 2022-01-10 PROCEDURE — 99999 PR PBB SHADOW E&M-EST. PATIENT-LVL III: ICD-10-PCS | Mod: PBBFAC,,,

## 2022-01-10 PROCEDURE — 99213 OFFICE O/P EST LOW 20 MIN: CPT | Mod: PBBFAC,27

## 2022-01-10 PROCEDURE — 99215 PR OFFICE/OUTPT VISIT, EST, LEVL V, 40-54 MIN: ICD-10-PCS | Mod: S$PBB,,, | Performed by: NURSE PRACTITIONER

## 2022-01-10 PROCEDURE — 99213 PR OFFICE/OUTPT VISIT, EST, LEVL III, 20-29 MIN: ICD-10-PCS | Mod: S$PBB,,, | Performed by: INTERNAL MEDICINE

## 2022-01-10 PROCEDURE — 99999 PR PBB SHADOW E&M-EST. PATIENT-LVL III: CPT | Mod: PBBFAC,,,

## 2022-01-10 RX ORDER — INSULIN LISPRO 100 [IU]/ML
6 INJECTION, SOLUTION INTRAVENOUS; SUBCUTANEOUS
Qty: 15 ML | Refills: 11 | Status: SHIPPED | OUTPATIENT
Start: 2022-01-10 | End: 2022-09-01 | Stop reason: DRUGHIGH

## 2022-01-10 RX ORDER — FUROSEMIDE 20 MG/1
TABLET ORAL
Qty: 6 TABLET | Refills: 0 | Status: SHIPPED | OUTPATIENT
Start: 2022-01-10 | End: 2022-01-24

## 2022-01-10 RX ORDER — POTASSIUM CHLORIDE 20 MEQ/1
20 TABLET, EXTENDED RELEASE ORAL DAILY
Qty: 12 TABLET | Refills: 0 | Status: SHIPPED | OUTPATIENT
Start: 2022-01-10 | End: 2022-01-24

## 2022-01-10 RX ORDER — NIFEDIPINE 60 MG/1
60 TABLET, EXTENDED RELEASE ORAL DAILY
Qty: 30 TABLET | Refills: 11 | Status: SHIPPED | OUTPATIENT
Start: 2022-01-10 | End: 2022-01-12 | Stop reason: SDUPTHER

## 2022-01-10 NOTE — ASSESSMENT & PLAN NOTE
Titrate insulin slowly to avoid hypoglycemia as the risk of hypoglycemia increases with decreased creatinine clearance.    Estimated Creatinine Clearance: 74.5 mL/min (based on SCr of 1.2 mg/dL).    Improving renal function may lead to increasing insulin requirement s/p transplant.

## 2022-01-10 NOTE — ASSESSMENT & PLAN NOTE
BG goal 140-180    - Levemir Flex Pen 7 units in the morning (Hold if BG < 80 mg/dL) (20% reduction due to FBG lows)  - Humalog (lispro) insulin 6 Units SQ TIDWM and prn for BG excursions /25. (Hold if BG < 100 mg/dL). (40% reduction due to lows and patient not taking home insulin dosing as prescribed).   - BG checks AC/HS/0200  - Lifestyle modifications (diet exercise and stress reduction).   - Send BG logs in 4 days.   - Johnny Tinoco sent      Lab Results   Component Value Date    HGBA1C 5.9 (H) 12/25/2021       No results found for: POCTGLUCOSE  Orders updated.   Diabetes Medications             insulin detemir U-100 (LEVEMIR FLEXTOUCH) 100 unit/mL (3 mL) SubQ InPn pen Inject 8 Units into the skin once daily.    insulin lispro (HUMALOG KWIKPEN INSULIN) 100 unit/mL pen Inject 10 Units into the skin 3 (three) times daily with meals. Plus sliding scale; TDD: 45 units          Lionel Vasquez DNP, FNP-C  Department of Endocrinology  Inpatient Glycemic Management

## 2022-01-10 NOTE — PROGRESS NOTES
Subjective:      Patient ID: Antwon Valdez is a 56 y.o. male.    Chief Complaint:    Diabetes    History of Present Illness  This is a follow up visit after recent hospitalization  Endocrinology was consulted for management of hyperglycemia. Consult was documented as follow:    Admit Date: 12/24/2021      Reason for Consult: Management of T2DM, Hyperglycemia      Surgical Procedure and Date: Kidney Transplant (Scheduled)     Diabetes diagnosis year: mid 30s      Home Diabetes Medications: humalog 1-7 units      How often checking glucose at home? >4 x day   BG readings on regimen: 100s   Hypoglycemia on the regimen?  no  Missed doses on regimen?  no     Diabetes Complications include:     Hyperglycemia, Diabetic nephropathy  , Diabetic chronic kidney disease     , and Diabetic dermatitis     Complicating diabetes co morbidities:   ESRD and Glucocorticoid use (s/p transplant); HTN; HLD        HPI:   Mr. Valdez is a 56 y.o. year old White male with hx ESRD secondary to diabetic nephropathy and HTN who presents for kidney transplant. Endocrine consulted to manage type 2 diabetes and hyperglycemia.        Type 2 diabetes mellitus with chronic kidney disease on chronic dialysis, with long-term current use of insulin  Endocrinology consulted for BG management.   BG goal 140-180        BG monitoring ac/hs/0200 and moderate dose correction scale given high dose steroids.   continue novolog 12 units with meals. continue levemir 10 units daily.      ** Please notify Endocrine for any change and/or advance in diet**  ** Please call Endocrine for any BG related issues **     Discharge Planning: decrease to levemir 8 units. novolog 10 units with meals plus correction scale 180-230+1 unit. Reviewed s/sx hypoglycemia and treatment. Patient verbalized understanding. BG logs in 4 days and follow up in 1-2 weeks.       NOTE: Called to discuss BG with patient. BG 75 after labs this morning. Patient took long acting and 2 units of  novolog this AM since blood sugar was running on low end. Instructed patient okay to hold novolog if blood sugar less than 100 and to touch base tomorrow with numbers for further insulin adjustments. Patient verbalized understanding and denies questions at this time.       Etiology of the hyperglycemia: known T2DM    Discharge Date: 12/28/2021  Home Glucocorticoid Regimen:  Prednisone 20 mg 12/28-1/27/2022  Prednisone 15 mg 1/28-2/27  Prednisone 10 mg 2/28-3/27  Prednisone 5 mg 3/28- onward.   Discharge DM Regimen:  decrease to levemir 8 units. novolog 10 units with meals plus correction scale 180-230+1 unit.     Patient taking insulin differently:   - Making insulin adjustments on his. Adjusting basal and prandial insulin. Levemir 0-8 units. Novolog 2-10 units plus SSI.  Was able to fill prescription for medications and supplies. Yes     Missed doses?  No    Prior medications not tolerated or Failed treatments:   Unsure.      Current Home DM management after discharge:  See above.     # times a day testing 3-4 times daily.   Log reviewed: yes - see picture     Hypoglycemic event at home? No. But BG < 75  If yes, needed assistance? No  Hypoglycemia unawareness No    Typical meals at home:   Breakfast: Scrambled eggs; half a waffle/ pancake; coffee (powdered cream); half bagel with cream cheese.   Lunch: baked fish; vegetables (non-starchy); potatoes; bowl of grits. Grilled cheese.  Dinner: Baked fish; chicken; bread; cabbage  Snacks: Protein bars; shakes; chips; small bag of popcorn; cookies sugar-free  Drinks: Fruit juices (Cranberry zero); water;         DM Education - last visit: Follow-up with endocrinologist in [place]      With regards to reason for admit:  Doing better       Review of Systems     Constitutional: Positive for weight changes.  Eyes: Negative for visual disturbance.  Respiratory: Negative for cough.   Cardiovascular: Negative for chest pain.  Gastrointestinal: Positive for nausea. Positive  for constipation.  Endocrine: Negative for polyuria, polydipsia.  Musculoskeletal: Negative for back pain.  Skin: Negative for rash.  Neurological: Negative for syncope.  Psychiatric/Behavioral: Negative for depression.        Objective:   Physical Exam   Constitutional: Well developed, well nourished, NAD.  ENT: External ears no masses with nose patent; normal hearing.  Neck: Supple; trachea midline.  Cardiovascular: Normal heart sounds, no LE edema. DP +2 bilaterally.  Lungs: Normal effort; lungs anterior bilaterally clear to auscultation.  Abdomen: Soft, no masses, no hernias.  MS: No clubbing or cyanosis of nails noted; normal gait or unable to assess gait.  Skin: No rashes, lesions, or ulcers; no nodules. Injection sites are ok. No lipo hypertropthy or atrophy. Surgical incision noted.   Psychiatric: Good judgement and insight; normal mood and affect.  Neurological: Cranial nerves are grossly intact.   Foot: Nails in good condition, no amputations noted.      Body mass index is 27.24 kg/m².    Lab Review:   Lab Results   Component Value Date    HGBA1C 5.9 (H) 12/25/2021     Lab Results   Component Value Date    CHOL 160 12/24/2021    HDL 40 12/24/2021    LDLCALC 84.4 12/24/2021    TRIG 178 (H) 12/24/2021    CHOLHDL 25.0 12/24/2021     Lab Results   Component Value Date     01/10/2022    K 3.0 (L) 01/10/2022     01/10/2022    CO2 28 01/10/2022    GLU 93 01/10/2022    BUN 16 01/10/2022    CREATININE 1.2 01/10/2022    CALCIUM 8.1 (L) 01/10/2022    PROT 6.3 12/24/2021    ALBUMIN 2.8 (L) 01/10/2022    BILITOT 0.5 12/24/2021    ALKPHOS 105 12/24/2021    AST 14 12/24/2021    ALT 15 12/24/2021    ANIONGAP 9 01/10/2022    ESTGFRAFRICA >60.0 01/10/2022    EGFRNONAA >60.0 01/10/2022       Assessment and Plan   Reviewed goals of therapy are to get the best control we can without hypoglycemia    Reviewed patient's current insulin regimen. Clarified proper insulin dose and timing in relation to meals, etc.  Insulin injection sites and proper rotation instructed.       -Advised frequent self blood glucose monitoring.  Patient encouraged to document glucose results and bring them to every clinic visit      -Hypoglycemia precautions discussed. Instructed on precautions before driving.      -Close adherence to lifestyle changes recommended.      -Periodic follow ups for eye evaluations, foot care and dental care suggested. Will be done in the [facility].     - Follow-up with home endocrine team as scheduled.     - Follow-up with [facility] [place] endocrine team via [email or web address].           Problem List Items Addressed This Visit        Renal/    S/P kidney transplant    Current Assessment & Plan     Titrate insulin slowly to avoid hypoglycemia as the risk of hypoglycemia increases with decreased creatinine clearance.    Estimated Creatinine Clearance: 74.5 mL/min (based on SCr of 1.2 mg/dL).    Improving renal function may lead to increasing insulin requirement s/p transplant.             Endocrine    Type 2 diabetes mellitus with chronic kidney disease, with long-term current use of insulin    Current Assessment & Plan     BG goal 140-180    - Levemir Flex Pen 7 units in the morning (Hold if BG < 80 mg/dL) (20% reduction due to FBG lows)  - Humalog (lispro) insulin 6 Units SQ TIDWM and prn for BG excursions /25. (Hold if BG < 100 mg/dL). (40% reduction due to lows and patient not taking home insulin dosing as prescribed).   - BG checks AC/HS/0200  - Lifestyle modifications (diet exercise and stress reduction).   - Send BG logs in 4 days.   - [email or web address] [facility]arn[facility] sent      Lab Results   Component Value Date    HGBA1C 5.9 (H) 12/25/2021       No results found for: POCTGLUCOSE  Orders updated.   Diabetes Medications             insulin detemir U-100 (LEVEMIR FLEXTOUCH) 100 unit/mL (3 mL) SubQ InPn pen Inject 8 Units into the skin once daily.    insulin lispro (HUMALOG KWIKPEN INSULIN) 100 unit/mL pen Inject 10 Units into the skin 3  (three) times daily with meals. Plus sliding scale; TDD: 45 units          Thanks,           Relevant Medications    insulin lispro (HUMALOG KWIKPEN INSULIN) 100 unit/mL pen    insulin detemir U-100 (LEVEMIR FLEXTOUCH) 100 unit/mL (3 mL) SubQ InPn pen       Other    Prophylactic immunotherapy    Current Assessment & Plan     On steroid therapy per transplant team; may elevate BG readings           Adrenal cortical steroids causing adverse effect in therapeutic use    Current Assessment & Plan     On steroid therapy per transplant team; may elevate BG readings                   Lionel Leon DNP, FNP-C  Department of Endocrinology  Inpatient Glycemic Management

## 2022-01-12 ENCOUNTER — PATIENT MESSAGE (OUTPATIENT)
Dept: TRANSPLANT | Facility: CLINIC | Age: 57
End: 2022-01-12
Payer: MEDICARE

## 2022-01-12 ENCOUNTER — TELEPHONE (OUTPATIENT)
Dept: TRANSPLANT | Facility: CLINIC | Age: 57
End: 2022-01-12
Payer: MEDICARE

## 2022-01-12 DIAGNOSIS — Z94.0 S/P KIDNEY TRANSPLANT: Primary | ICD-10-CM

## 2022-01-12 DIAGNOSIS — I10 ESSENTIAL HYPERTENSION: ICD-10-CM

## 2022-01-12 NOTE — TELEPHONE ENCOUNTER
"Spoke with pt regarding below and [employer] msgs sent. Pt stated that he has had 2-3 episodes of loose stool. No fever. No stomach pains. No nausea. Pt stated he has an "ache" on the right side of his abdomen by his incision.No redness. Incision looks good. No drainage. Pain is off and on.   Pt refused to come to clinic to see surgeon or give stool sample. Stated is not that bad, but if it gets worse will call.  Encouraged pt to call if symptoms persist or if diarrhea continues and or episodes increase.  Pt also wanting to get covid tested. Pt denies any cough, fever, HA, sore throat. Pt stated he just wanted to get tested to get ahead of the game. Explained to pt that he could go to a testing site if he chooses. But if was not exposed and has no symptoms it is not necessary.  Pt was seen in clinic on 1/10/22 and has labs tomorrow 1/13/22.  ----- Message from Deb Wallace sent at 1/12/2022 12:05 PM CST -----  Regarding: questions  Patient states that he has been having a real bad stomach pains for the past three days and would like to know what can he take for it.    Antwon  @  [phone]      "

## 2022-01-13 ENCOUNTER — PROCEDURE VISIT (OUTPATIENT)
Dept: UROLOGY | Facility: CLINIC | Age: 57
End: 2022-01-13
Payer: MEDICARE

## 2022-01-13 ENCOUNTER — PATIENT MESSAGE (OUTPATIENT)
Dept: TRANSPLANT | Facility: CLINIC | Age: 57
End: 2022-01-13
Payer: MEDICARE

## 2022-01-13 ENCOUNTER — TELEPHONE (OUTPATIENT)
Dept: TRANSPLANT | Facility: CLINIC | Age: 57
End: 2022-01-13
Payer: MEDICARE

## 2022-01-13 ENCOUNTER — NURSE TRIAGE (OUTPATIENT)
Dept: ADMINISTRATIVE | Facility: CLINIC | Age: 57
End: 2022-01-13
Payer: MEDICARE

## 2022-01-13 VITALS
HEART RATE: 83 BPM | WEIGHT: 195.88 LBS | SYSTOLIC BLOOD PRESSURE: 140 MMHG | DIASTOLIC BLOOD PRESSURE: 82 MMHG | RESPIRATION RATE: 16 BRPM | TEMPERATURE: 97 F | BODY MASS INDEX: 26.53 KG/M2 | HEIGHT: 72 IN

## 2022-01-13 DIAGNOSIS — Z94.0 S/P KIDNEY TRANSPLANT: Primary | ICD-10-CM

## 2022-01-13 PROCEDURE — 52310 PR CYSTOSCOPY,REMV CALCULUS,SIMPLE: ICD-10-PCS | Mod: S$PBB,,, | Performed by: UROLOGY

## 2022-01-13 PROCEDURE — 52310 CYSTOSCOPY AND TREATMENT: CPT | Mod: S$PBB,,, | Performed by: UROLOGY

## 2022-01-13 PROCEDURE — 52310 CYSTOSCOPY AND TREATMENT: CPT | Mod: PBBFAC | Performed by: UROLOGY

## 2022-01-13 PROCEDURE — 52315 CYSTOSCOPY AND TREATMENT: CPT | Mod: PBBFAC | Performed by: UROLOGY

## 2022-01-13 RX ORDER — NIFEDIPINE 30 MG/1
30 TABLET, EXTENDED RELEASE ORAL DAILY
Qty: 30 TABLET | Refills: 11 | Status: SHIPPED | OUTPATIENT
Start: 2022-01-13 | End: 2022-02-07

## 2022-01-13 RX ORDER — LIDOCAINE HYDROCHLORIDE 20 MG/ML
JELLY TOPICAL
Status: COMPLETED | OUTPATIENT
Start: 2022-01-13 | End: 2022-01-13

## 2022-01-13 RX ADMIN — LIDOCAINE HYDROCHLORIDE: 20 JELLY TOPICAL at 12:01

## 2022-01-13 NOTE — TELEPHONE ENCOUNTER
Called pt this morning regarding mychart msg. Pt stated both pain in leg and pain in abdomen have gone away. He is no longer having any pain. Swelling in both legs has gone down. BP today was good. Pt stated it was 150 over something. He could not remember both numbers. Pt had no complaints. Stated he was feeling good but he is anxious for stent removal today.  Will call pt with lab results this afternoon.  Pt verbalized understanding.

## 2022-01-13 NOTE — PROCEDURES
Cystoscopy w/ stent removal    Date/Time: 1/13/2022 12:30 PM  Performed by: Lauri Porras MD  Authorized by: Ernie Clark MD   Preparation: Patient was prepped and draped in the usual sterile fashion.  Local anesthesia used: yes    Anesthesia:  Local anesthesia used: yes  Local Anesthetic: topical anesthetic    Sedation:  Patient sedated: no    Patient tolerance: patient tolerated the procedure well with no immediate complications  Comments: Entire stent removed without difficulty.

## 2022-01-13 NOTE — PATIENT INSTRUCTIONS

## 2022-01-14 ENCOUNTER — TELEPHONE (OUTPATIENT)
Dept: TRANSPLANT | Facility: CLINIC | Age: 57
End: 2022-01-14
Payer: MEDICARE

## 2022-01-14 NOTE — TELEPHONE ENCOUNTER
SW notified by pt's post-transplant coordinator Preeti RUIZ RN that patient's caregiver has tested positive for COVID and has returned home, leaving pt alone at [facility].  SW contacted pt ([phone]) to f/u.  Pt confirms niece/caregiver Sherry has tested positive for COVID and has returned home.  Pt reports back up caregivers are sick as well - son Ladarius has the flu and daughter Sheila has COVID as well.  Patient states his fiancee Alysia Salinas ([phone]) stays with him on the weekends and will plan to stay with patient starting this evening after she gets off of work.  Pt reports experiencing mild symptoms including headache, fatigue, and diarrhea.  Pt reports feeling healthy otherwise and is able to walk to the hospital if/when pt is scheduled for COVID test or other appointments.  Pt also reports ability to pay for cab rides if needed but states feeling physically well and was planning on walking to hospital/clinic for future appointments.  Pt states plan today is to continue finding additional caregivers who can assume caregiver role for next week when Alysia returns to work on Monday.  Pt stated plans of notifying transplant team of caregiver updates when available.  ALANNA consulted with transplant SW supervisor Samson BRIAN LCSW and transplant coordinator supervisor Zuleyma FABIAN RN, and post-transplant coordinator Preeti and provided all with all the above updates from pt.  Pt will await communication from transplant coordinator regarding next steps/precautions to take.

## 2022-01-14 NOTE — TELEPHONE ENCOUNTER
Pt reports his caretaker just tested Covid positive today, pt states he is having mild symptoms and being a newly transplanted kidney pt, he is wanting to know what he should do. Pt's temp is 98.1 orally, has a mild HA and fatigue, has been having diarrhea for 3 days now, went 3 times today (diarrhea). Pt denies any SOB, chest pain, or any other symptoms, Pt advised a call will be placed to On call MD since he is a high risk pt per protocol. Dr. Quentin Sullivan, On call for Kidney transplant surgeon, contacted and notified of pt's recent exposure and current symptoms. Dr. Sullivan advised that if pt is only having the mild symptoms he can wait to be tested tomorrow and follow up with his transplant team when office is open. Pt informed and encouraged to call back with any worsening symptoms or questions. Pt verbalized understanding.    Reason for Disposition   HIGH RISK patient (e.g., age > 64 years, diabetes, heart or lung disease, weak immune system)    Additional Information   Negative: Severe difficulty breathing (e.g., struggling for each breath, speaks in single words)   Negative: Difficult to awaken or acting confused (e.g., disoriented, slurred speech)   Negative: Bluish (or gray) lips or face now   Negative: Shock suspected (e.g., cold/pale/clammy skin, too weak to stand, low BP, rapid pulse)   Negative: Sounds like a life-threatening emergency to the triager   Negative: SEVERE or constant chest pain (Exception: mild central chest pain, present only when coughing)   Negative: MODERATE difficulty breathing (e.g., speaks in phrases, SOB even at rest, pulse 100-120)   Negative: Patient sounds very sick or weak to the triager   Negative: MILD difficulty breathing (e.g., minimal/no SOB at rest, SOB with walking, pulse <100)   Negative: Chest pain   Negative: Fever > 103 F (39.4 C)   Negative: [1] Fever > 101 F (38.3 C) AND [2] age > 60   Negative: [1] Fever > 100.0 F (37.8 C) AND [2] bedridden (e.g.,  nursing home patient, CVA, chronic illness, recovering from surgery)    Protocols used: CORONAVIRUS (COVID-19) - DIAGNOSED OR [id number]

## 2022-01-14 NOTE — TELEPHONE ENCOUNTER
Returned pt call. Pt reports his caregiver was not feeling well and tested positive for covid. Caregiver went home. Pt stated there was no one to come stay with him d/t everyone that could come is sick with covid.  Pt is staying at the [facility] and has no transportation at this time.  Spoke with Héctor MONDRAGON regarding situation no caregiver or transportation. Héctor to get back with me to see if anything we can do.----- Message from Estrellita Eduardo sent at 1/14/2022  8:40 AM CST -----  Regarding: speak to coordinator  Contact: Antwon  Patient called to speak to coordinator      Contact: [phone]

## 2022-01-16 ENCOUNTER — PATIENT MESSAGE (OUTPATIENT)
Dept: TRANSPLANT | Facility: CLINIC | Age: 57
End: 2022-01-16
Payer: MEDICARE

## 2022-01-17 ENCOUNTER — LAB VISIT (OUTPATIENT)
Dept: LAB | Facility: HOSPITAL | Age: 57
End: 2022-01-17
Attending: INTERNAL MEDICINE
Payer: MEDICARE

## 2022-01-17 DIAGNOSIS — Z94.0 S/P KIDNEY TRANSPLANT: ICD-10-CM

## 2022-01-17 LAB
ALBUMIN SERPL BCP-MCNC: 3.1 G/DL (ref 3.5–5.2)
ANION GAP SERPL CALC-SCNC: 10 MMOL/L (ref 8–16)
BASOPHILS # BLD AUTO: 0.04 K/UL (ref 0–0.2)
BASOPHILS NFR BLD: 0.3 % (ref 0–1.9)
BUN SERPL-MCNC: 12 MG/DL (ref 6–20)
CALCIUM SERPL-MCNC: 8.3 MG/DL (ref 8.7–10.5)
CHLORIDE SERPL-SCNC: 104 MMOL/L (ref 95–110)
CO2 SERPL-SCNC: 25 MMOL/L (ref 23–29)
CREAT SERPL-MCNC: 1.1 MG/DL (ref 0.5–1.4)
DIFFERENTIAL METHOD: ABNORMAL
EOSINOPHIL # BLD AUTO: 0.2 K/UL (ref 0–0.5)
EOSINOPHIL NFR BLD: 2 % (ref 0–8)
ERYTHROCYTE [DISTWIDTH] IN BLOOD BY AUTOMATED COUNT: 14.1 % (ref 11.5–14.5)
EST. GFR  (AFRICAN AMERICAN): >60 ML/MIN/1.73 M^2
EST. GFR  (NON AFRICAN AMERICAN): >60 ML/MIN/1.73 M^2
GLUCOSE SERPL-MCNC: 91 MG/DL (ref 70–110)
HCT VFR BLD AUTO: 35.9 % (ref 40–54)
HGB BLD-MCNC: 11.4 G/DL (ref 14–18)
IMM GRANULOCYTES # BLD AUTO: 0.06 K/UL (ref 0–0.04)
IMM GRANULOCYTES NFR BLD AUTO: 0.5 % (ref 0–0.5)
LYMPHOCYTES # BLD AUTO: 4.2 K/UL (ref 1–4.8)
LYMPHOCYTES NFR BLD: 35.6 % (ref 18–48)
MAGNESIUM SERPL-MCNC: 1.3 MG/DL (ref 1.6–2.6)
MCH RBC QN AUTO: 30.2 PG (ref 27–31)
MCHC RBC AUTO-ENTMCNC: 31.8 G/DL (ref 32–36)
MCV RBC AUTO: 95 FL (ref 82–98)
MONOCYTES # BLD AUTO: 0.8 K/UL (ref 0.3–1)
MONOCYTES NFR BLD: 7.1 % (ref 4–15)
NEUTROPHILS # BLD AUTO: 6.4 K/UL (ref 1.8–7.7)
NEUTROPHILS NFR BLD: 54.5 % (ref 38–73)
NRBC BLD-RTO: 0 /100 WBC
PHOSPHATE SERPL-MCNC: 3 MG/DL (ref 2.7–4.5)
PLATELET # BLD AUTO: 273 K/UL (ref 150–450)
PMV BLD AUTO: 11.6 FL (ref 9.2–12.9)
POTASSIUM SERPL-SCNC: 4.3 MMOL/L (ref 3.5–5.1)
RBC # BLD AUTO: 3.77 M/UL (ref 4.6–6.2)
SODIUM SERPL-SCNC: 139 MMOL/L (ref 136–145)
TACROLIMUS BLD-MCNC: 6.2 NG/ML (ref 5–15)
WBC # BLD AUTO: 11.76 K/UL (ref 3.9–12.7)

## 2022-01-17 PROCEDURE — 80197 ASSAY OF TACROLIMUS: CPT | Performed by: INTERNAL MEDICINE

## 2022-01-17 PROCEDURE — 80069 RENAL FUNCTION PANEL: CPT | Performed by: INTERNAL MEDICINE

## 2022-01-17 PROCEDURE — 36415 COLL VENOUS BLD VENIPUNCTURE: CPT | Performed by: INTERNAL MEDICINE

## 2022-01-17 PROCEDURE — 83735 ASSAY OF MAGNESIUM: CPT | Performed by: INTERNAL MEDICINE

## 2022-01-17 PROCEDURE — 85025 COMPLETE CBC W/AUTO DIFF WBC: CPT | Performed by: INTERNAL MEDICINE

## 2022-01-17 RX ORDER — TACROLIMUS 0.5 MG/1
CAPSULE ORAL
Qty: 180 CAPSULE | Refills: 11 | Status: SHIPPED | OUTPATIENT
Start: 2022-01-17 | End: 2022-02-01

## 2022-01-17 NOTE — TELEPHONE ENCOUNTER
Instructed patient to change prograf/tacrolimus dose to 1.5 mg bid. Prescription updated with pharmacy per Dr. Navarro. Patient is aware, he states he will update his blue card.  ----- Message from Guerline Goel MD sent at 1/17/2022 12:30 PM CST -----  Increase tacro to 1.5 mg BID

## 2022-01-19 ENCOUNTER — OFFICE VISIT (OUTPATIENT)
Dept: TRANSPLANT | Facility: CLINIC | Age: 57
End: 2022-01-19
Payer: MEDICARE

## 2022-01-19 VITALS
BODY MASS INDEX: 25.32 KG/M2 | DIASTOLIC BLOOD PRESSURE: 61 MMHG | HEIGHT: 72 IN | OXYGEN SATURATION: 100 % | WEIGHT: 186.94 LBS | HEART RATE: 95 BPM | TEMPERATURE: 97 F | SYSTOLIC BLOOD PRESSURE: 122 MMHG | RESPIRATION RATE: 16 BRPM

## 2022-01-19 DIAGNOSIS — Z94.0 KIDNEY REPLACED BY TRANSPLANT: ICD-10-CM

## 2022-01-19 DIAGNOSIS — Z51.81 ENCOUNTER FOR THERAPEUTIC DRUG MONITORING: Primary | ICD-10-CM

## 2022-01-19 PROCEDURE — 99213 PR OFFICE/OUTPT VISIT, EST, LEVL III, 20-29 MIN: ICD-10-PCS | Mod: 24,S$PBB,, | Performed by: SURGERY

## 2022-01-19 PROCEDURE — 99213 OFFICE O/P EST LOW 20 MIN: CPT | Mod: 24,S$PBB,, | Performed by: SURGERY

## 2022-01-19 PROCEDURE — 99999 PR PBB SHADOW E&M-EST. PATIENT-LVL V: CPT | Mod: PBBFAC,,, | Performed by: SURGERY

## 2022-01-19 PROCEDURE — 99215 OFFICE O/P EST HI 40 MIN: CPT | Mod: PBBFAC | Performed by: SURGERY

## 2022-01-19 PROCEDURE — 99999 PR PBB SHADOW E&M-EST. PATIENT-LVL V: ICD-10-PCS | Mod: PBBFAC,,, | Performed by: SURGERY

## 2022-01-19 NOTE — LETTER
January 19, 2022        Héctor Calvillo  [address]  [facility]  [place] [address]  Phone: [phone]  Fax: [phone]             [facility]- Transplant 1st Fl  [address]  [facility] [address]  Phone: [phone]   Patient: Antwon Valdez   MR Number: [id number]   YOB: 1965   Date of Visit: 1/19/2022       Dear Dr. Héctor Calvillo    Thank you for referring Antwon Valdez to me for evaluation. Attached you will find relevant portions of my assessment and plan of care.    If you have questions, please do not hesitate to call me. I look forward to following Antwon Valdez along with you.    Sincerely,    Marcio Mckinley MD    Enclosure    If you would like to receive this communication electronically, please contact [email or web address] or [phone] to request [employer] Link access.    [employer] Link is a tool which provides read-only access to select patient information with whom you have a relationship. Its easy to use and provides real time access to review your patients record including encounter summaries, notes, results, and demographic information.    If you feel you have received this communication in error or would no longer like to receive these types of communications, please e-mail [email or web address]

## 2022-01-19 NOTE — PROGRESS NOTES
Transplant Surgery  Kidney Transplant Recipient Follow-up    Referring Physician: Héctor Calvillo  Current Nephrologist: Héctor Calvillo    Subjective:     Chief Complaint: Antwon Valdez is a 56 y.o. year old White male who is status post Kidney transplant performed on 12/25/2021.    ORGAN:  RIGHT KIDNEY  Disease Etiology: Diabetes Mellitus - Type Other / Unknown  Donor Type:  Donation after Brain Death  Donor CMV Status:  Positive  Donor HBcAB:  Negative  Donor HCV Status:  Negative    History of Present Illness: He reports no concerns.  From a transplant perspective, he reports normal urination.  Antwon is here for management of his immunosuppression medication.  Antwon states that his immunosuppression is being well tolerated.  Hypertension is not present.    External provider notes reviewed: No    Review of Systems   Constitutional: Negative for fatigue.   HENT: Negative for drooling, postnasal drip and sore throat.    Eyes: Negative for discharge and itching.   Respiratory: Negative for choking and stridor.    Gastrointestinal: Negative for rectal pain.   Endocrine: Negative for polydipsia.   Genitourinary: Negative for enuresis and genital sores.   Musculoskeletal: Negative for back pain, neck pain and neck stiffness.   Allergic/Immunologic: Positive for immunocompromised state.   Neurological: Negative for facial asymmetry and numbness.   Hematological: Negative for adenopathy.   Psychiatric/Behavioral: Negative for behavioral problems, self-injury and suicidal ideas.       Objective:     Physical Exam  Lab Results   Component Value Date    CREATININE 1.1 01/17/2022    BUN 12 01/17/2022     Lab Results   Component Value Date    WBC 11.76 01/17/2022    HGB 11.4 (L) 01/17/2022    HCT 35.9 (L) 01/17/2022    HCT 28 (L) 12/25/2021     01/17/2022     Lab Results   Component Value Date    TACROLIMUS 6.2 01/17/2022       Diagnostics:  The following labs have been reviewed: CBC  CMP  TACROLIMUS  LEVEL      Assessment and Plan:        · S/P Kidney transplant.  · Chronic immunosuppressive medications for rejection prophylaxis at target.  Plan: no adjustment needed.  · Continue monitoring symptoms, labs and drug levels for drug-related toxicity and side effects.  · Renal hypertension at target.  · Staples removed    Additional testing to be completed according to the Kidney: Written Order Guideline for Kidney Transplant Follow-Up (KI-09)    Interpretation of tests and discussion of patient management involves all members of the multidisciplinary transplant team.  Patient advised that it is recommended that all transplant candidates, and their close contacts and household members receive Covid vaccination.  Follow-up: Patient reminded to call with any health changes, since these can be early signs of significant complications.  Also, I advised the patient to be sure any new medications or changes of old medications are discussed with either a pharmacist, or physician knowledgeable with transplant to avoid rejection/drug toxicity related to significant drug interactions.    Marcio Mckinley MD       [facility] Patient Status  Functional Status: 90% - Able to carry on normal activity: minor symptoms of disease  Physical Capacity: No Limitations

## 2022-01-24 ENCOUNTER — OFFICE VISIT (OUTPATIENT)
Dept: TRANSPLANT | Facility: CLINIC | Age: 57
End: 2022-01-24
Payer: MEDICARE

## 2022-01-24 ENCOUNTER — TELEPHONE (OUTPATIENT)
Dept: TRANSPLANT | Facility: CLINIC | Age: 57
End: 2022-01-24

## 2022-01-24 VITALS
HEART RATE: 98 BPM | OXYGEN SATURATION: 99 % | HEIGHT: 72 IN | TEMPERATURE: 97 F | BODY MASS INDEX: 25.08 KG/M2 | RESPIRATION RATE: 20 BRPM | SYSTOLIC BLOOD PRESSURE: 118 MMHG | DIASTOLIC BLOOD PRESSURE: 61 MMHG | WEIGHT: 185.19 LBS

## 2022-01-24 DIAGNOSIS — Z79.4 TYPE 2 DIABETES MELLITUS WITH STAGE 2 CHRONIC KIDNEY DISEASE, WITH LONG-TERM CURRENT USE OF INSULIN: ICD-10-CM

## 2022-01-24 DIAGNOSIS — I10 ESSENTIAL HYPERTENSION: ICD-10-CM

## 2022-01-24 DIAGNOSIS — D63.1 ANEMIA OF CHRONIC RENAL FAILURE, STAGE 2 (MILD): Chronic | ICD-10-CM

## 2022-01-24 DIAGNOSIS — Z29.89 PROPHYLACTIC IMMUNOTHERAPY: ICD-10-CM

## 2022-01-24 DIAGNOSIS — N18.2 ANEMIA OF CHRONIC RENAL FAILURE, STAGE 2 (MILD): Chronic | ICD-10-CM

## 2022-01-24 DIAGNOSIS — E11.22 TYPE 2 DIABETES MELLITUS WITH STAGE 2 CHRONIC KIDNEY DISEASE, WITH LONG-TERM CURRENT USE OF INSULIN: ICD-10-CM

## 2022-01-24 DIAGNOSIS — Z79.60 LONG-TERM USE OF IMMUNOSUPPRESSANT MEDICATION: ICD-10-CM

## 2022-01-24 DIAGNOSIS — Z94.0 S/P KIDNEY TRANSPLANT: Primary | ICD-10-CM

## 2022-01-24 DIAGNOSIS — Z91.89 AT RISK FOR OPPORTUNISTIC INFECTIONS: ICD-10-CM

## 2022-01-24 DIAGNOSIS — N25.81 SECONDARY HYPERPARATHYROIDISM OF RENAL ORIGIN: ICD-10-CM

## 2022-01-24 DIAGNOSIS — N18.2 TYPE 2 DIABETES MELLITUS WITH STAGE 2 CHRONIC KIDNEY DISEASE, WITH LONG-TERM CURRENT USE OF INSULIN: ICD-10-CM

## 2022-01-24 PROCEDURE — 99999 PR PBB SHADOW E&M-EST. PATIENT-LVL V: CPT | Mod: PBBFAC,,, | Performed by: NURSE PRACTITIONER

## 2022-01-24 PROCEDURE — 99215 PR OFFICE/OUTPT VISIT, EST, LEVL V, 40-54 MIN: ICD-10-PCS | Mod: S$PBB,,, | Performed by: NURSE PRACTITIONER

## 2022-01-24 PROCEDURE — 99215 OFFICE O/P EST HI 40 MIN: CPT | Mod: PBBFAC | Performed by: NURSE PRACTITIONER

## 2022-01-24 PROCEDURE — 99215 OFFICE O/P EST HI 40 MIN: CPT | Mod: S$PBB,,, | Performed by: NURSE PRACTITIONER

## 2022-01-24 PROCEDURE — 99999 PR PBB SHADOW E&M-EST. PATIENT-LVL V: ICD-10-PCS | Mod: PBBFAC,,, | Performed by: NURSE PRACTITIONER

## 2022-01-24 RX ORDER — VALGANCICLOVIR 450 MG/1
900 TABLET, FILM COATED ORAL DAILY
Qty: 60 TABLET | Refills: 2 | Status: SHIPPED | OUTPATIENT
Start: 2022-01-24 | End: 2022-06-22 | Stop reason: ALTCHOICE

## 2022-01-24 RX ORDER — SULFAMETHOXAZOLE AND TRIMETHOPRIM 400; 80 MG/1; MG/1
1 TABLET ORAL DAILY
Qty: 30 TABLET | Refills: 5 | Status: SHIPPED | OUTPATIENT
Start: 2022-01-24 | End: 2022-01-28

## 2022-01-24 RX ORDER — LANOLIN ALCOHOL/MO/W.PET/CERES
400 CREAM (GRAM) TOPICAL DAILY
Qty: 30 TABLET | Refills: 11 | Status: SHIPPED | OUTPATIENT
Start: 2022-01-24 | End: 2022-03-21

## 2022-01-24 NOTE — LETTER
January 24, 2022        Héctor Calvillo  [address]  [place] [facility]  [place] [address]  Phone: [phone]  Fax: [phone]             [facility]- Transplant 1st Fl  [address]  [facility] [address]  Phone: [phone]   Patient: Antwon Valdez   MR Number: [id number]   YOB: 1965   Date of Visit: 1/24/2022       Dear Dr. Héctor Calvillo    Thank you for referring Antwon Valdez to me for evaluation. Attached you will find relevant portions of my assessment and plan of care.    If you have questions, please do not hesitate to call me. I look forward to following Antwon Valdez along with you.    Sincerely,    Zara Quiroga, NP    Enclosure    If you would like to receive this communication electronically, please contact [email or web address] or [phone] to request [employer] access.    [employer] is a tool which provides read-only access to select patient information with whom you have a relationship. Its easy to use and provides real time access to review your patients record including encounter summaries, notes, results, and demographic information.    If you feel you have received this communication in error or would no longer like to receive these types of communications, please e-mail [email or web address]

## 2022-01-24 NOTE — TELEPHONE ENCOUNTER
Spoke with pt regarding below. Pt was seen in clinic today. Pt scheduled for labs Thursday and Monday next week.----- Message from Guerline Goel MD sent at 1/24/2022  1:29 PM CST -----  Increased ALT  Repeat hepatic panel in 1 week; remind him to report new sx  Repeat tacro in 1 week; review for drug interactions and med changes

## 2022-01-24 NOTE — TELEPHONE ENCOUNTER
----- Message from Guerline Goel MD sent at 1/24/2022  1:16 PM CST -----  Available results reviewed and require no immediate action.

## 2022-01-24 NOTE — PROGRESS NOTES
Kidney Post-Transplant Assessment    Referring Physician: Héctor Calvillo  Current Nephrologist: Héctor Calvillo    ORGAN: RIGHT KIDNEY  Donor Type: donation after brain death  PHS Increased Risk: no  Cold Ischemia: 1,221 mins  Induction Medications: simulect - basiliximab    Subjective:     CC:  Reassessment of renal allograft function and management of chronic immunosuppression.    HPI:  Mr. Valdez is a 56 y.o. 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 His most recent creatinine is 1.3. He takes mycophenolate mofetil, prednisone and tacrolimus for maintenance immunosuppression. His post transplant course has been uncomplicated to date.    ESRD 2/2 DM. PD (cath removed intra-op), native uop ~180 ml/day. Patient is now s/p DBD KTX 12/25/21 (Simulect induction, CIT 20h 21m, KDPI 65%, CMV D+/R+). Per op note, small mass excised for biopsy at donor site and determined to be benign, but excision site left a 1.5-2cm wide superficial defect that caused expected bleeding after reperfusion that was unable to be sutured due to shape and risk of tearing parenchyma, therefore, evarrest patch applied with complete hemostasis    Hospitalization/ ED visits  None    Interval HX:   HX gastric sleeve--and has challenges getting all his water in  Intake 2-2.5L water daily   UOP 2-2.5L  BP  Pt reports BP at home 150-170 systolic. BP taken prior to taking BP meds. BP acceptable in clinic today. Instructed to recheck BP ~ 1 1/2-2 hours after taking BP meds to ensure they are working   BP Readings from Last 3 Encounters:   01/24/22 118/61   01/19/22 122/61   01/13/22 (!) 140/82     Peripheral edema  None, no longer on LASIX   Weight stable   Appetite good  Wound -healed  fx assessment still feeling weak and fatigued.  Lab /diagnostic results reviewed with patient today.   All questions answered         Current Outpatient Medications:     blood sugar diagnostic Strp, 1 each  by Misc.(Non-Drug; Combo Route) route 3 (three) times daily., Disp: 100 each, Rfl: 11    blood-glucose meter kit, Use as instructed, Disp: 1 each, Rfl: 0    cholecalciferol, vitamin D3, (VITAMIN D3) 50 mcg (2,000 unit) Tab, Take 2,000 Units by mouth once daily., Disp: , Rfl:     cloNIDine (CATAPRES) 0.1 MG tablet, Take 1 tablet (0.1 mg total) by mouth 2 (two) times daily as needed (for SBP (blood pressure top number) > 170)., Disp: 60 tablet, Rfl: 1    cyanocobalamin (VITAMIN B-12) 1000 MCG tablet, Take 1,000 mcg by mouth once daily., Disp: , Rfl:     docusate sodium (COLACE) 100 MG capsule, Take 1 capsule (100 mg total) by mouth 3 (three) times daily as needed for Constipation., Disp: 30 capsule, Rfl: 0    famotidine (PEPCID) 20 MG tablet, Take 1 tablet (20 mg total) by mouth every evening., Disp: 30 tablet, Rfl: 0    insulin detemir U-100 (LEVEMIR FLEXTOUCH) 100 unit/mL (3 mL) SubQ InPn pen, Inject 7 Units into the skin once daily., Disp: 15 mL, Rfl: 11    insulin lispro (HUMALOG KWIKPEN INSULIN) 100 unit/mL pen, Inject 6 Units into the skin 3 (three) times daily with meals. Plus SSI: 180 - 230 + 2 unit; 231- 280  + 4 units; 281 - 330 + 6 units; 331 - 380 + 8 units; > 380   + 10 units. Max TDD 48 units., Disp: 15 mL, Rfl: 11    k phos di & mono-sod phos mono (K-PHOS-NEUTRAL) 250 mg Tab, Take 2 tablets by mouth 2 (two) times a day., Disp: 120 tablet, Rfl: 11    lancets Misc, 1 each by Misc.(Non-Drug; Combo Route) route 3 (three) times daily., Disp: 100 each, Rfl: 11    multivitamin Tab, Take 1 tablet by mouth once daily., Disp: , Rfl:     mycophenolate (CELLCEPT) 250 mg Cap, Take 4 capsules (1,000 mg total) by mouth 2 (two) times daily., Disp: 240 capsule, Rfl: 11    NIFEdipine (PROCARDIA-XL) 30 MG (OSM) 24 hr tablet, Take 1 tablet (30 mg total) by mouth once daily., Disp: 30 tablet, Rfl: 11    pantoprazole (PROTONIX) 40 MG tablet, Take 1 tablet (40 mg total) by mouth once daily., Disp: 30 tablet,  "Rfl: 11    pen needle, diabetic (EASY COMFORT PEN NEEDLES) 32 gauge x 5/32" Ndle, Inject 1 each into the skin 3 (three) times daily., Disp: 100 each, Rfl: 11    posaconazole (NOXAFIL) 100 mg TbEC tablet, Take 3 tablets (300 mg total) by mouth once daily. Stop 1/25/22 for 27 days, Disp: 81 tablet, Rfl: 0    predniSONE (DELTASONE) 5 MG tablet, Take 20 mg by mouth once daily from 12/28-1/27/22;  Take 15mg daily from 1/28-2/27;   Take 10mg daily from 2/28-3/27; Take 5 mg daily thereafter beginning 3/28/22 (Patient taking differently: Take 20 mg by mouth once daily from 12/28-1/27/22;  Take 15mg daily from 1/28-2/27;   Take 10mg daily from 2/28-3/27; Take 5 mg daily thereafter beginning 3/28/22), Disp: 120 tablet, Rfl: 11    sodium bicarbonate 650 MG tablet, Take 2 tablets (1,300 mg total) by mouth 3 (three) times daily., Disp: 180 tablet, Rfl: 11    tacrolimus (PROGRAF) 0.5 MG Cap, Take 3 capsules (1.5 mg total) by mouth every morning AND 3 capsules (1.5 mg total) every evening., Disp: 180 capsule, Rfl: 11    traMADoL (ULTRAM) 50 mg tablet, Take 1 tablet (50 mg total) by mouth every 6 (six) hours as needed for Pain., Disp: 28 tablet, Rfl: 0    magnesium oxide (MAG-OX) 400 mg (241.3 mg magnesium) tablet, Take 1 tablet (400 mg total) by mouth once daily., Disp: 30 tablet, Rfl: 11    sulfamethoxazole-trimethoprim 400-80mg (BACTRIM,SEPTRA) 400-80 mg per tablet, Take 1 tablet by mouth once daily. STOP 6/23/22, Disp: 30 tablet, Rfl: 5    valGANciclovir (VALCYTE) 450 mg Tab, Take 2 tablets (900 mg total) by mouth once daily. STOP 3/25/22, Disp: 60 tablet, Rfl: 2      Past Medical History:   Diagnosis Date    Chronic pulmonary embolism 6/1/2021    Diabetes mellitus     Diabetic nephropathy associated with type 2 diabetes mellitus 6/1/2021    Disorder of kidney and ureter     ESRD (end stage renal disease) 6/1/2021    Essential hypertension 6/1/2021    GERD (gastroesophageal reflux disease)     Mixed " hyperlipidemia 6/1/2021    Sleep apnea 6/1/2021         Review of Systems   Constitutional: Negative for appetite change, chills, fatigue and fever.   HENT: Negative for trouble swallowing.    Eyes: Positive for visual disturbance.        Wears glasses   Respiratory: Negative for cough, chest tightness, shortness of breath and wheezing.    Cardiovascular: Negative for chest pain, palpitations and leg swelling.   Gastrointestinal: Negative for abdominal pain, constipation, diarrhea and nausea.   Genitourinary: Negative for difficulty urinating, frequency and urgency.   Musculoskeletal: Negative for arthralgias and myalgias.   Skin: Negative for rash.   Allergic/Immunologic: Positive for immunocompromised state.   Neurological: Positive for tremors. Negative for dizziness, weakness, light-headedness and headaches.        Mild shakiness of the hands    Psychiatric/Behavioral: Negative for sleep disturbance.       Objective:   Blood pressure 118/61, pulse 98, temperature 97.3 °F (36.3 °C), temperature source Temporal, resp. rate 20, height 6' (1.829 m), weight 84 kg (185 lb 3 oz), SpO2 99 %.body mass index is 25.12 kg/m².    Physical Exam  Constitutional:       General: He is not in acute distress.     Appearance: He is well-developed. He is not diaphoretic.   Cardiovascular:      Rate and Rhythm: Normal rate and regular rhythm.      Heart sounds: Normal heart sounds. No murmur heard.  No friction rub. No gallop.    Pulmonary:      Effort: Pulmonary effort is normal. No respiratory distress.      Breath sounds: Normal breath sounds. No wheezing or rales.   Abdominal:      General: Bowel sounds are normal. There is no distension.      Palpations: Abdomen is soft.      Tenderness: There is no abdominal tenderness.   Musculoskeletal:         General: No tenderness. Normal range of motion.   Skin:     General: Skin is warm and dry.      Findings: No rash.      Nails: There is no clubbing.          Neurological:      Mental  Status: He is alert and oriented to person, place, and time.   Psychiatric:         Behavior: Behavior normal.         Labs:  Lab Results   Component Value Date    WBC 14.88 (H) 01/24/2022    HGB 12.1 (L) 01/24/2022    HCT 36.3 (L) 01/24/2022     01/24/2022    K 4.2 01/24/2022     01/24/2022    CO2 25 01/24/2022    BUN 13 01/24/2022    CREATININE 1.3 01/24/2022    EGFRNONAA >60.0 01/24/2022    CALCIUM 9.0 01/24/2022    PHOS 2.5 (L) 01/24/2022    MG 1.3 (L) 01/24/2022    ALBUMIN 3.4 (L) 01/24/2022    ALBUMIN 3.4 (L) 01/24/2022    AST 23 01/24/2022    ALT 98 (H) 01/24/2022    UTPCR Unable to calculate 01/24/2022    .5 (H) 12/24/2021    TACROLIMUS 6.2 01/24/2022       No results found for: EXTANC, EXTWBC, EXTSEGS, EXTPLATELETS, EXTHEMOGLOBI, EXTHEMATOCRI, EXTCREATININ, EXTSODIUM, EXTPOTASSIUM, EXTBUN, EXTCO2, EXTCALCIUM, EXTPHOSPHORU, EXTGLUCOSE, EXTALBUMIN, EXTAST, EXTALT, EXTBILITOTAL, EXTLIPASE, EXTAMYLASE    No results found for: EXTCYCLOSLVL, EXTSIROLIMUS, EXTTACROLVL, EXTPROTCRE, EXTPTHINTACT, EXTPROTEINUA, EXTWBCUA, EXTRBCUA    Labs were reviewed with the patient    Assessment:     1. S/P kidney transplant    2. Long-term use of immunosuppressant medication    3. Essential hypertension    4. Type 2 diabetes mellitus with stage 2 chronic kidney disease, with long-term current use of insulin    5. Secondary hyperparathyroidism of renal origin    6. Anemia of chronic renal failure, stage 2 (mild)    7. At risk for opportunistic infections    8. Prophylactic immunotherapy        Plan:   If labs remain stable this week, OK for Pt to go home, please organize labs and move prescriptions as needed       Guerline Goel MD   1/24/2022    Increased ALT  Repeat hepatic panel in 1 week; remind him to report new sx   1/24/2022  POD 30   ALT 10 - 44 U/L 98 (A)     posoconazole stops tomorrow, will need repeat trough  Thursday or Friday to  Reassess      Increased--> Valcyte to 900 mg PO daily and  Bactrim to daily dosing with improved renal function;    Leukocytosis: asymptomatic   Lab Results   Component Value Date    WBC 14.88 (H) 01/24/2022    --afebrile, no s/s of infection. Could be secondary to steroid use. Patient educated on s/s infection and when to call coordinator    Hypomagnesemia:   Start mg ox 400 mg daily       1. CKD stage: will continue follow up as per our center guidelines. patient to continue close follow up with the local General nephrologist. Education provided in appropriate fluid intake, potassium intake. Continue with oral hydration.      2. Immunosuppression: Prograf trough 6.2, therapeutic target 8-10.  Prograf 1.5/1.5, MMF 1000 Mg BID, and Prednisone taper  Lab Results   Component Value Date    TACROLIMUS 6.2 01/24/2022    TACROLIMUS 10.2 01/20/2022    TACROLIMUS 6.2 01/17/2022     No results found for: CYCLOSPORINE  Will closely monitor for toxicities, education provided about adherence to medicines and need to communicate any side effect to the transplant nurse or physician.      3. Allograft Function:stable at baseline for the patient. Continue follow up as per our guidelines and with the local General nephrologist. Communication will be sent today.         Lab Results   Component Value Date    CREATININE 1.3 01/24/2022 1/24/2022  POD 30   eGFR if non African American >60 mL/min/1.73 m^2 >60.0  Calculation used to obtain the estimated glomerular filtration  rate (eGFR) is the CKD-EPI equation.         4. Hypertension management:  Continue with home blood pressure monitoring, low salt and healthy life discussed with the patient. Nifedipine, clonidine    BP Readings from Last 3 Encounters:   01/24/22 118/61   01/19/22 122/61   01/13/22 (!) 140/82       5. Metabolic Bone Disease/Secondary Hyperparathyroidism:calcium and phosphorus level discussed with the patient, patient will continue follow up with the general nephrologist for management of metabolic bone disease calcium and  phosphorus as per our center protocol. Will monitor PTH, Vit D level, calcium.   KPN, Vit D. Start mg ox 400 mg daily   Lab Results   Component Value Date    .5 (H) 12/24/2021    CALCIUM 9.0 01/24/2022    PHOS 2.5 (L) 01/24/2022    PHOS 2.7 01/20/2022    PHOS 3.0 01/17/2022 1/24/2022  POD 30   Magnesium 1.6 - 2.6 mg/dL 1.3 (A)       6. Electrolytes: reviewed with the patient, essentially within the normal range no need for acute changes today, will monitor as per our center guidelines.     Lab Results   Component Value Date     01/24/2022    K 4.2 01/24/2022     01/24/2022    CO2 25 01/24/2022    CO2 25 01/20/2022    CO2 25 01/17/2022       7. Anemia: will continue monitoring as per our center guidelines. No indication for acute intervention today.  Lab Results   Component Value Date    WBC 14.88 (H) 01/24/2022    HGB 12.1 (L) 01/24/2022    HCT 36.3 (L) 01/24/2022    MCV 94 01/24/2022     01/24/2022       8.Proteinuria: will continue with pr/cr ratio as per our center guidelines  Lab Results   Component Value Date    PROTEINURINE <7 01/24/2022    CREATRANDUR 21.0 (L) 01/24/2022    UTPCR Unable to calculate 01/24/2022    UTPCR 0.27 (H) 01/13/2022        9. BK virus infection screening: will continue with urine or blood PCR as per our guidelines to prevent BK virus viremia and allograft dysfunction  No results found for: BKVIRUSDNAUR, BKQUANTURINE, BKVIRUSLOG, BKVIRUSURINE, BKVIRUSPCRQB      10. Weight education: provided during the clinic visit.  Body mass index is 25.12 kg/m².     11.Patient safety education regarding immunosuppression including prophylaxis posttransplant for CMV, PCP : Education provided about vaccination and prevention of infections.    12.  Cytopenias: no significant cytopenias will monitor as per our guidelines. Medicine list reviewed including potential causes of drug-induced cytopenias  Lab Results   Component Value Date    WBC 14.88 (H) 01/24/2022    HGB 12.1  (L) 01/24/2022    HCT 36.3 (L) 01/24/2022    MCV 94 01/24/2022     01/24/2022       13. Post-transplant Prophylaxis; CMV Infection, PJP and Candida mucosistis and other indicated for this particular patient.   PCP PROPHYLAXIS: Bactrim until 6/25/22  CMV PROPHYLAXIS: Valganciclovir until 3/29/22   FUNGAL PROPHYLAXIS: Posaconazole until 1/25/22        Follow-up:   Clinic: return to transplant clinic weekly for the first month after transplant; every 2 weeks during months 2-3; then at 6-, 9-, 12-, 18-, 24-, and 36- months post-transplant to reassess for complications from immunosuppression toxicity and monitor for rejection.  Annually thereafter.    Labs: since patient remains at high risk for rejection and drug-related complications that warrant close monitoring, labs will be ordered as follows: continue twice weekly CBC, renal panel, and drug level for first month; then same labs once weekly through 3rd month post-transplant.  Urine for UA and protein/creatinine ratio monthly.  Serum BK - PCR at 1-, 3-, 6-, 9-, 12-, 18-, 24-, 36- 48-, and 60 months post-transplant.  Hepatic panel at 1-, 2-, 3-, 6-, 9-, 12-, 18-, 24-, and 36- months post-transplant.    Education:   Material provided to the patient.  Patient reminded to call with any health changes since these can be early signs of significant complications.  Also, I advised the patient to be sure any new medications or changes of old medications are discussed with either a pharmacist or physician knowledgeable with transplant to avoid rejection/drug toxicity related to significant drug interactions.    Exercise: reminded Casper of the importance of regular exercise for weight management, blood sugar and blood pressure management.  I also explained exercise has been shown to improve cardiovascular health, energy level, and sleep hygiene.  Lastly, I advised him that cardiovascular complications are leading cause of death for renal transplant recipients, and  regular exercise can help lower this risk.    I spoke with the patient for 30 minutes. More than half dedicated to counseling and education. All questions answered    PRISCILLA Childs  Transplant Nephrology     Follow-up:   Clinic: return to transplant clinic weekly for the first month after transplant; every 2 weeks during months 2-3; then at 6-, 9-, 12-, 18-, 24-, and 36- months post-transplant to reassess for complications from immunosuppression toxicity and monitor for rejection.  Annually thereafter.    Labs: since patient remains at high risk for rejection and drug-related complications that warrant close monitoring, labs will be ordered as follows: continue twice weekly CBC, renal panel, and drug level for first month; then same labs once weekly through 3rd month post-transplant.  Urine for UA and protein/creatinine ratio monthly.  Serum BK - PCR at 1-, 3-, 6-, 9-, 12-, 18-, 24-, 36-, 48-, and 60 months post-transplant.  Hepatic panel at 1-, 2-, 3-, 6-, 9-, 12-, 18-, 24-, and 36- months post-transplant.    Zara Quiroga NP       Education:   Material provided to the patient.  Patient reminded to call with any health changes since these can be early signs of significant complications.  Also, I advised the patient to be sure any new medications or changes of old medications are discussed with either a pharmacist or physician knowledgeable with transplant to avoid rejection/drug toxicity related to significant drug interactions.    Patient advised that it is recommended that all transplanted patients, and their close contacts and household members receive Covid vaccination.

## 2022-01-26 DIAGNOSIS — Z57.8 OCCUPATIONAL EXPOSURE TO OTHER RISK FACTORS: Primary | ICD-10-CM

## 2022-01-26 SDOH — SOCIAL DETERMINANTS OF HEALTH (SDOH): OCCUPATIONAL EXPOSURE TO OTHER RISK FACTORS: Z57.8

## 2022-01-27 ENCOUNTER — TELEPHONE (OUTPATIENT)
Dept: TRANSPLANT | Facility: CLINIC | Age: 57
End: 2022-01-27
Payer: MEDICARE

## 2022-01-27 ENCOUNTER — PATIENT MESSAGE (OUTPATIENT)
Dept: TRANSPLANT | Facility: CLINIC | Age: 57
End: 2022-01-27
Payer: MEDICARE

## 2022-01-28 ENCOUNTER — TELEPHONE (OUTPATIENT)
Dept: TRANSPLANT | Facility: CLINIC | Age: 57
End: 2022-01-28
Payer: MEDICARE

## 2022-01-28 NOTE — TELEPHONE ENCOUNTER
PharmD note about colestid being filled 10 days ago noted.  This does not appaer to be current med on our [facility] list - please verify with patient. I see in CE it was prescribed last Aug, but I wonder if we stopped post txp and he restarted?  Please go through all meds he is taking to make sure we are on same page.    He should see hepatology, which he can do locally.

## 2022-01-28 NOTE — TELEPHONE ENCOUNTER
Spoke with pt regarding holding Bactrim. Pt verbalized understanding. Reviewed medications over phone with pt. All medication and doses  Confirmed with pt. ----- Message from Guerline Goel MD sent at 1/28/2022  1:42 PM CST -----  please ASK HIM TO HOLD BACTRIM      ----- Message -----  From: Manuela Jain, PharmD  Sent: 1/28/2022   8:34 AM CST  To: Guerline Goel MD    Potentially.  I will say that sometimes [facility] will autofill meds, would double check that he is indeed taking it.  The interaction that could happen with tac would increase the likelihood of elevated LFTs  ----- Message -----  From: Guerline Goel MD  Sent: 1/27/2022   6:18 PM CST  To: Manuela Jain, PharmCAROLINE    Weird - I did not see that in his current meds, but after you mssg, I can see he had it before.  Could stopping and restarting it cause this?    ----- Message -----  From: Manuela Jain PharmCAROLINE  Sent: 1/27/2022   1:09 PM CST  To: Guerline Goel MD    He had Colestid filled about 10 days ago  ----- Message -----  From: Guerline Goel MD  Sent: 1/27/2022  12:46 PM CST  To: [facility], #    Marked increase in ALT.  Will add pharmD to check on drug induced cause; I expect we need to hold bactrim.

## 2022-01-28 NOTE — TELEPHONE ENCOUNTER
----- Message from Manuela Jain, PharmD sent at 1/27/2022  1:08 PM CST -----  He had Colestid filled about 10 days ago  ----- Message -----  From: Guerline Goel MD  Sent: 1/27/2022  12:46 PM CST  To: [facility], #    Marked increase in ALT.  Will add pharmD to check on drug induced cause; I expect we need to hold bactrim.

## 2022-01-28 NOTE — TELEPHONE ENCOUNTER
Spoke with pt regarding medications. Pt denies taking Colestid. Pt stated he was taking fish oils at one time but has stopped taking also. Pt to see hepatology at home, will ask his primary nephrologist for referral.

## 2022-02-01 ENCOUNTER — DOCUMENTATION ONLY (OUTPATIENT)
Dept: TRANSPLANT | Facility: CLINIC | Age: 57
End: 2022-02-01
Payer: MEDICARE

## 2022-02-01 DIAGNOSIS — Z94.0 S/P KIDNEY TRANSPLANT: ICD-10-CM

## 2022-02-01 LAB
EXT ALBUMIN: 3.5 (ref 3.7–5.3)
EXT BUN: 15 (ref 6–20)
EXT CALCIUM: 8.2 (ref 8.5–10.5)
EXT CHLORIDE: 105 (ref 101–112)
EXT CO2: 29 (ref 20–32)
EXT CREATININE: 1.09 MG/DL
EXT EOSINOPHIL%: 1.2
EXT GFR MDRD NON AF AMER: >60
EXT GLUCOSE: 103 (ref 74–106)
EXT HEMATOCRIT: 36 (ref 43.5–53.7)
EXT HEMOGLOBIN: 11.7 (ref 14.1–18.1)
EXT LYMPH%: 31
EXT MAGNESIUM: 1.7 (ref 1.3–2.1)
EXT MONOCYTES%: 4.5
EXT PHOSPHORUS: ABNORMAL
EXT PLATELETS: 268 (ref 142–424)
EXT POTASSIUM: 3.6 (ref 3.4–5.1)
EXT SEGS%: 63
EXT SODIUM: 140 MMOL/L (ref 135–145)
EXT TACROLIMUS LVL: 2.2
EXT WBC: 12.7 (ref 4.6–10.2)

## 2022-02-01 RX ORDER — TACROLIMUS 1 MG/1
CAPSULE ORAL
Qty: 540 CAPSULE | Refills: 3 | Status: SHIPPED | OUTPATIENT
Start: 2022-02-01 | End: 2022-02-10

## 2022-02-01 NOTE — TELEPHONE ENCOUNTER
Spoke with pt regarding below. Tacro to 3mg BID. Pt aware of new 1mg strength ordered. ----- Message from Guerline Goel MD sent at 2/1/2022  4:08 PM CST -----  Increase from 1.5 mg BID to 3.0 mg BID - please send in new rx of 1 mg caps and advise patietn to pay attention to caps mg to get correct 3 mg dose [either 6-0.5 mg or 3-1 mg]

## 2022-02-02 ENCOUNTER — DOCUMENTATION ONLY (OUTPATIENT)
Dept: TRANSPLANT | Facility: CLINIC | Age: 57
End: 2022-02-02
Payer: MEDICARE

## 2022-02-02 LAB — EXT PHOSPHORUS: 2.5 (ref 2.7–4.5)

## 2022-02-02 NOTE — PROGRESS NOTES
Kidney Post-Transplant Assessment    Referring Physician: Héctor Calvillo  Current Nephrologist: Héctor Calvillo    ORGAN: RIGHT KIDNEY  Donor Type: donation after brain death  PHS Increased Risk: no  Cold Ischemia: 1,221 mins  Induction Medications: simulect - basiliximab    Subjective:     CC:  Reassessment of renal allograft function and management of chronic immunosuppression.    HPI:  Mr. Valdez is a 56 y.o. 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 His most recent creatinine is 1.09. He takes mycophenolate mofetil, prednisone and tacrolimus for maintenance immunosuppression. His post transplant course has been uncomplicated to date.    Transplant History:  -ESRD secondary to DM.   -on PD until DBD KTX 12/25/21 (Simulect induction, CIT 20h 21m, KDPI 65%, CMV D+/R+).   -Per op note, small mass excised for biopsy at donor site and determined to be benign, but excision site left a 1.5-2cm wide superficial defect that caused expected bleeding after reperfusion that was unable to be sutured due to shape and risk of tearing parenchyma, therefore, evarrest patch applied with complete hemostasis    Interval History:  Givgek-05-61 oz water daily  UOP-no issues  BP-120-160/80 HOME BPs  Peripheral edema-none  Weight-185, stable  Appetite--excellent  Wound-healed    Fx assessment-feels great, no CP or SOB with exertion. Looks good.    Current Outpatient Medications   Medication Sig Dispense Refill    blood sugar diagnostic Strp 1 each by Misc.(Non-Drug; Combo Route) route 3 (three) times daily. 100 each 11    blood-glucose meter kit Use as instructed 1 each 0    cholecalciferol, vitamin D3, (VITAMIN D3) 50 mcg (2,000 unit) Tab Take 2,000 Units by mouth once daily.      cyanocobalamin (VITAMIN B-12) 1000 MCG tablet Take 1,000 mcg by mouth once daily.      docusate sodium (COLACE) 100 MG capsule Take 1 capsule (100 mg total) by mouth 3 (three) times daily as  "needed for Constipation. 30 capsule 0    insulin detemir U-100 (LEVEMIR FLEXTOUCH) 100 unit/mL (3 mL) SubQ InPn pen Inject 7 Units into the skin once daily. 15 mL 11    insulin lispro (HUMALOG KWIKPEN INSULIN) 100 unit/mL pen Inject 6 Units into the skin 3 (three) times daily with meals. Plus SSI: 180 - 230 + 2 unit; 231- 280  + 4 units; 281 - 330 + 6 units; 331 - 380 + 8 units; > 380   + 10 units. Max TDD 48 units. 15 mL 11    k phos di & mono-sod phos mono (K-PHOS-NEUTRAL) 250 mg Tab Take 2 tablets by mouth 2 (two) times a day. 120 tablet 11    lancets Misc 1 each by Misc.(Non-Drug; Combo Route) route 3 (three) times daily. 100 each 11    magnesium oxide (MAG-OX) 400 mg (241.3 mg magnesium) tablet Take 1 tablet (400 mg total) by mouth once daily. 30 tablet 11    multivitamin Tab Take 1 tablet by mouth once daily.      mycophenolate (CELLCEPT) 250 mg Cap Take 4 capsules (1,000 mg total) by mouth 2 (two) times daily. 240 capsule 11    pantoprazole (PROTONIX) 40 MG tablet Take 1 tablet (40 mg total) by mouth once daily. 30 tablet 11    pen needle, diabetic (EASY COMFORT PEN NEEDLES) 32 gauge x 5/32" Ndle Inject 1 each into the skin 3 (three) times daily. 100 each 11    predniSONE (DELTASONE) 5 MG tablet Take 20 mg by mouth once daily from 12/28-1/27/22;  Take 15mg daily from 1/28-2/27;   Take 10mg daily from 2/28-3/27; Take 5 mg daily thereafter beginning 3/28/22 (Patient taking differently: Take 20 mg by mouth once daily from 12/28-1/27/22;  Take 15mg daily from 1/28-2/27;   Take 10mg daily from 2/28-3/27; Take 5 mg daily thereafter beginning 3/28/22) 120 tablet 11    tacrolimus (PROGRAF) 1 MG Cap Take 3 capsules (3 mg total) by mouth every morning AND 3 capsules (3 mg total) every evening. 540 capsule 3    traMADoL (ULTRAM) 50 mg tablet Take 1 tablet (50 mg total) by mouth every 6 (six) hours as needed for Pain. 28 tablet 0    valGANciclovir (VALCYTE) 450 mg Tab Take 2 tablets (900 mg total) by mouth " once daily. STOP 3/25/22 60 tablet 2    NIFEdipine (PROCARDIA-XL) 30 MG (OSM) 24 hr tablet Take 1 tablet (30 mg total) by mouth 2 (two) times a day. 60 tablet 11    sodium bicarbonate 650 MG tablet Take 1 tablet (650 mg total) by mouth 2 (two) times daily. 60 tablet 11     No current facility-administered medications for this visit.       Past Medical History:   Diagnosis Date    Chronic pulmonary embolism 6/1/2021    Diabetes mellitus     Diabetic nephropathy associated with type 2 diabetes mellitus 6/1/2021    Disorder of kidney and ureter     ESRD (end stage renal disease) 6/1/2021    Essential hypertension 6/1/2021    GERD (gastroesophageal reflux disease)     Mixed hyperlipidemia 6/1/2021    Sleep apnea 6/1/2021       Review of Systems   Constitutional: Negative for activity change, appetite change and fever.   HENT: Negative for congestion, mouth sores and sore throat.    Eyes: Positive for visual disturbance.        Wears glasses   Respiratory: Negative for cough, chest tightness and shortness of breath.    Cardiovascular: Negative for chest pain, palpitations and leg swelling.   Gastrointestinal: Negative for abdominal distention, abdominal pain, constipation, diarrhea and nausea.   Genitourinary: Negative for difficulty urinating, frequency and hematuria.   Musculoskeletal: Negative for arthralgias and gait problem.   Skin: Negative for wound.   Allergic/Immunologic: Positive for immunocompromised state. Negative for environmental allergies and food allergies.   Neurological: Negative for dizziness, weakness and numbness.   Psychiatric/Behavioral: Negative for sleep disturbance. The patient is not nervous/anxious.        Objective:   Blood pressure 134/71, pulse 88, temperature 97.7 °F (36.5 °C), temperature source Temporal, resp. rate 16, height 6' (1.829 m), weight 85.6 kg (188 lb 11.4 oz), SpO2 97 %.body mass index is 25.59 kg/m².    Physical Exam  Vitals and nursing note reviewed.    Constitutional:       Appearance: Normal appearance.   HENT:      Head: Normocephalic.   Cardiovascular:      Rate and Rhythm: Normal rate and regular rhythm.      Heart sounds: Normal heart sounds.   Pulmonary:      Effort: Pulmonary effort is normal.      Breath sounds: Normal breath sounds.   Abdominal:      General: Bowel sounds are normal. There is no distension.      Palpations: Abdomen is soft.      Tenderness: There is no abdominal tenderness.      Comments: No bruit noted over the allograft    Musculoskeletal:         General: Normal range of motion.   Skin:     General: Skin is warm and dry.   Neurological:      General: No focal deficit present.      Mental Status: He is alert.   Psychiatric:         Behavior: Behavior normal.         Labs:  Lab Results   Component Value Date    WBC 11.34 01/27/2022    HGB 11.7 (L) 01/27/2022    HCT 36.0 (L) 01/27/2022     01/27/2022    K 3.6 01/27/2022     01/27/2022    CO2 28 01/27/2022    BUN 14 01/27/2022    CREATININE 1.0 01/27/2022    EGFRNONAA >60.0 01/27/2022    CALCIUM 8.3 (L) 01/27/2022    PHOS 3.0 01/27/2022    MG 1.5 (L) 01/27/2022    ALBUMIN 3.3 (L) 01/27/2022    ALBUMIN 3.3 (L) 01/27/2022    AST 39 01/27/2022     (H) 01/27/2022    UTPCR Unable to calculate 01/24/2022    .5 (H) 12/24/2021    TACROLIMUS 7.1 01/27/2022       Lab Results   Component Value Date    EXTWBC 12.7 (A) 02/01/2022    EXTSEGS 63 02/01/2022    EXTPLATELETS 268 02/01/2022    EXTHEMOGLOBI 11.7 (A) 02/01/2022    EXTHEMATOCRI 36 (A) 02/01/2022    EXTCREATININ 1.09 02/01/2022    EXTSODIUM 140 02/01/2022    EXTPOTASSIUM 3.6 02/01/2022    EXTBUN 15 02/01/2022    EXTCO2 29 02/01/2022    EXTCALCIUM 8.2 (A) 02/01/2022    EXTPHOSPHORU pending 02/01/2022    EXTPHOSPHORU 2.5 (A) 02/01/2022    EXTGLUCOSE 103 02/01/2022    EXTALBUMIN 3.5 (A) 02/01/2022       Lab Results   Component Value Date    EXTTACROLVL 2.2 02/01/2022       Labs were reviewed with the  patient    Assessment:     1. S/P kidney transplant    2. Essential hypertension    3. Long-term use of immunosuppressant medication    4. CKD (chronic kidney disease), stage II    5. Diabetic nephropathy associated with type 2 diabetes mellitus    6. At risk for opportunistic infections        Plan:   Needs repeat prograf level  Sodium bicarb 1300 mg TID--->650 mg BID  Bactrim on hold due to elevated ALT, check G6PD next labs for possible dapsone for PJP prophylaxis   Recheck hepatic panel with next labs  BP above goal: increased nifedipine 30 mg BID    Follow-up:   1. CKD stage: 2    2. Immunosuppression: Prograf trough 2.2, which is SUBtherapeutic (target 8-10). Continue Prograf 3/3 (dose increased 2/1/2022), MMF 1000 mg BID, and Prednisone taper. Will continue to monitor for drug toxicities    3. Allograft Function: Stable. Continue good po hydration.    2/1/2022  POD 38   EXT Creatinine mg/dL 1.09   EXT GFR MDRD NON AF AMER >60       4. Hypertension management: advise low salt diet and home BP monitoring    BP above goal: increase nifedipine 30 mg BID    5. Metabolic Bone Disease/Secondary Hyperparathyroidism:stable  Will monitor PTH, CA and Vit D/guidelines  D3 2000 units QD,  mg BID, MagOx 400 mg QD   2/1/2022  POD 38   EXT Calcium 8.5 - 10.5 8.2 (A)   EXT Phosphorus 2.7 - 4.5 2.5 (A)   EXT Magnesium 1.3 - 2.1 1.7       6. Electrolytes:  Will monitor /guidelines   Sodium bicarb 1300 mg TID--->650 mg BID     2/1/2022  POD 38   EXT Sodium 135 - 145 mmol/L 140   EXT Potassium 3.4 - 5.1 3.6   EXT Chloride 101 - 112 105   EXT CO2 20 - 32 29     7. Anemia: stable. No need for intervention       2/1/2022  POD 38   EXT WBC 4.6 - 10.2 12.7 (A)   EXT Hemoglobin 14.1 - 18.1 11.7 (A)   EXT Hematocrit 43.5 - 53.7 36 (A)   EXT Platelets 142 - 424 268       8.  Cytopenias: no significant cytopenias will monitor as per our guidelines. Medicine list reviewed including potential causes of drug-induced  cytopenias    9.Proteinuria: continue p/c ratio as per guidelines       1/24/2022  POD 30   Prot/Creat Ratio, Urine 0.00 - 0.20 Unable to calculate       10. BK virus infection screening:  will continue to monitor/ guidelines      1/24/2022  POD 30   BK Virus DNA, Blood Not detected Not detected   BK Virus DNA PCR, Quant, Blood <125 Copies/mL <125       11. Weight education: provided during the clinic visit   Body mass index is 25.59 kg/m².     12.Patient safety education regarding immunosuppression including prophylaxis posttransplant for CMV, PCP : Education provided about vaccination and prevention of infections     PCP PROPHYLAXIS: Bactrim until 6/25/22 (on hold due to elevated ALT) checking G6PD for possible dapsone   CMV PROPHYLAXIS: Valganciclovir until 3/29/22   FUNGAL PROPHYLAXIS: Posaconazole until 1/25/22-completed       Follow-up:   Clinic: return to transplant clinic weekly for the first month after transplant; every 2 weeks during months 2-3; then at 6-, 9-, 12-, 18-, 24-, and 36- months post-transplant to reassess for complications from immunosuppression toxicity and monitor for rejection.  Annually thereafter.    Labs: since patient remains at high risk for rejection and drug-related complications that warrant close monitoring, labs will be ordered as follows: continue twice weekly CBC, renal panel, and drug level for first month; then same labs once weekly through 3rd month post-transplant.  Urine for UA and protein/creatinine ratio monthly.  Serum BK - PCR at 1-, 3-, 6-, 9-, 12-, 18-, 24-, 36-, 48-, and 60 months post-transplant.  Hepatic panel at 1-, 2-, 3-, 6-, 9-, 12-, 18-, 24-, and 36- months post-transplant.    Sandra Luna NP       Education:   Material provided to the patient.  Patient reminded to call with any health changes since these can be early signs of significant complications.  Also, I advised the patient to be sure any new medications or changes of old medications are discussed  with either a pharmacist or physician knowledgeable with transplant to avoid rejection/drug toxicity related to significant drug interactions.    Patient advised that it is recommended that all transplanted patients, and their close contacts and household members receive Covid vaccination.

## 2022-02-07 ENCOUNTER — OFFICE VISIT (OUTPATIENT)
Dept: TRANSPLANT | Facility: CLINIC | Age: 57
End: 2022-02-07
Payer: MEDICARE

## 2022-02-07 VITALS
HEIGHT: 72 IN | RESPIRATION RATE: 16 BRPM | SYSTOLIC BLOOD PRESSURE: 134 MMHG | BODY MASS INDEX: 25.56 KG/M2 | OXYGEN SATURATION: 97 % | DIASTOLIC BLOOD PRESSURE: 71 MMHG | WEIGHT: 188.69 LBS | TEMPERATURE: 98 F | HEART RATE: 88 BPM

## 2022-02-07 DIAGNOSIS — N18.2 CKD (CHRONIC KIDNEY DISEASE), STAGE II: ICD-10-CM

## 2022-02-07 DIAGNOSIS — I10 ESSENTIAL HYPERTENSION: ICD-10-CM

## 2022-02-07 DIAGNOSIS — E11.21 DIABETIC NEPHROPATHY ASSOCIATED WITH TYPE 2 DIABETES MELLITUS: ICD-10-CM

## 2022-02-07 DIAGNOSIS — Z79.60 LONG-TERM USE OF IMMUNOSUPPRESSANT MEDICATION: ICD-10-CM

## 2022-02-07 DIAGNOSIS — Z94.0 S/P KIDNEY TRANSPLANT: Primary | ICD-10-CM

## 2022-02-07 DIAGNOSIS — Z91.89 AT RISK FOR OPPORTUNISTIC INFECTIONS: ICD-10-CM

## 2022-02-07 PROCEDURE — 99215 OFFICE O/P EST HI 40 MIN: CPT | Mod: S$PBB,,, | Performed by: NURSE PRACTITIONER

## 2022-02-07 PROCEDURE — 99999 PR PBB SHADOW E&M-EST. PATIENT-LVL V: CPT | Mod: PBBFAC,,, | Performed by: NURSE PRACTITIONER

## 2022-02-07 PROCEDURE — 99215 PR OFFICE/OUTPT VISIT, EST, LEVL V, 40-54 MIN: ICD-10-PCS | Mod: S$PBB,,, | Performed by: NURSE PRACTITIONER

## 2022-02-07 PROCEDURE — 99999 PR PBB SHADOW E&M-EST. PATIENT-LVL V: ICD-10-PCS | Mod: PBBFAC,,, | Performed by: NURSE PRACTITIONER

## 2022-02-07 PROCEDURE — 99215 OFFICE O/P EST HI 40 MIN: CPT | Mod: PBBFAC | Performed by: NURSE PRACTITIONER

## 2022-02-07 RX ORDER — NIFEDIPINE 30 MG/1
30 TABLET, EXTENDED RELEASE ORAL 2 TIMES DAILY
Qty: 60 TABLET | Refills: 11 | Status: SHIPPED | OUTPATIENT
Start: 2022-02-07 | End: 2022-09-21

## 2022-02-07 RX ORDER — SODIUM BICARBONATE 650 MG/1
650 TABLET ORAL 2 TIMES DAILY
Qty: 60 TABLET | Refills: 11 | Status: SHIPPED | OUTPATIENT
Start: 2022-02-07 | End: 2022-02-21

## 2022-02-07 NOTE — PATIENT INSTRUCTIONS
Decrease sodium bicarbonate 650 mg (1 pill) twice a day  Increase nifedipine to 30 mg twice a day (HOLD SBP<120)    It is my privilege to participate in your transplant care! Please be sure to let us know if you have any questions or concerns about your health care - we cannot help you if we do not know. Don't forget we are on call 24/7 for any emergencies.      Best Wishes,  Sandra Luna NP-C

## 2022-02-07 NOTE — LETTER
February 7, 2022        Héctor Calvillo  [address]  [facility]  [place] [address]  Phone: [phone]  Fax: [phone]             [facility] Trans[facility] 1st Fl  [address]  [facility] [address]  Phone: [phone]   Patient: Antwon Valdez   MR Number: [id number]   YOB: 1965   Date of Visit: 2/7/2022       Dear Dr. Héctor Calvillo    Thank you for referring Antwon Valdez to me for evaluation. Attached you will find relevant portions of my assessment and plan of care.    If you have questions, please do not hesitate to call me. I look forward to following Antwon Valdez along with you.    Sincerely,    Sandra Luna, NP    Enclosure    If you would like to receive this communication electronically, please contact [email or web address] or [phone] to request [facility] [employer] access.    [employer] is a tool which provides read-only access to select patient information with whom you have a relationship. Its easy to use and provides real time access to review your patients record including encounter summaries, notes, results, and demographic information.    If you feel you have received this communication in error or would no longer like to receive these types of communications, please e-mail [email or web address]

## 2022-02-09 ENCOUNTER — DOCUMENTATION ONLY (OUTPATIENT)
Dept: TRANSPLANT | Facility: CLINIC | Age: 57
End: 2022-02-09
Payer: MEDICARE

## 2022-02-09 ENCOUNTER — SOCIAL WORK (OUTPATIENT)
Dept: TRANSPLANT | Facility: CLINIC | Age: 57
End: 2022-02-09
Payer: MEDICARE

## 2022-02-09 LAB
EXT ALBUMIN: 3.6 (ref 3.7–5.3)
EXT ALKALINE PHOSPHATASE: 144 (ref 34–154)
EXT ALT: 72 (ref 7–55)
EXT AST: 20 (ref 8–35)
EXT BILIRUBIN DIRECT: 0.4 MG/DL (ref 0–0.4)
EXT BILIRUBIN TOTAL: 0.8 (ref 0.3–1.2)
EXT BUN: 13 (ref 6–20)
EXT CALCIUM: 8.4 (ref 8.5–10.5)
EXT CHLORIDE: 108 (ref 101–112)
EXT CO2: 29 (ref 20–32)
EXT CREATININE: 0.99 MG/DL
EXT EOSINOPHIL%: 1.3
EXT GFR MDRD NON AF AMER: >60
EXT GLUCOSE: 110 (ref 74–106)
EXT HEMATOCRIT: 36.3 (ref 43.5–53.7)
EXT HEMOGLOBIN: 11.4 (ref 14.1–18.1)
EXT LYMPH%: 26.4
EXT MAGNESIUM: 1.6 (ref 1.3–2.1)
EXT MONOCYTES%: 4.1
EXT PHOSPHORUS: 2.9 (ref 2.7–4.5)
EXT PLATELETS: 279 (ref 142–424)
EXT POTASSIUM: 4.3 (ref 3.4–5.1)
EXT PROTEIN TOTAL: 5.9 (ref 6.4–8.3)
EXT SEGS%: 67.8
EXT SODIUM: 143 MMOL/L (ref 135–145)
EXT TACROLIMUS LVL: 3.5
EXT WBC: 9.9 (ref 4.6–10.2)

## 2022-02-09 NOTE — PROGRESS NOTES
SW completed [facility] registration form notifying them of pt's date of transplant.  Received confirmation email this morning.

## 2022-02-10 DIAGNOSIS — Z94.0 S/P KIDNEY TRANSPLANT: ICD-10-CM

## 2022-02-10 RX ORDER — TACROLIMUS 1 MG/1
CAPSULE ORAL
Qty: 1080 CAPSULE | Refills: 3 | Status: SHIPPED | OUTPATIENT
Start: 2022-02-10 | End: 2022-04-26

## 2022-02-10 NOTE — TELEPHONE ENCOUNTER
Spoke with pt regarding below. Pt confirmed 1 mg pills.----- Message from Guerline Goel MD sent at 2/10/2022  1:31 PM CST -----  Increase tacro to 6 mg BID

## 2022-02-14 ENCOUNTER — PATIENT MESSAGE (OUTPATIENT)
Dept: TRANSPLANT | Facility: CLINIC | Age: 57
End: 2022-02-14
Payer: MEDICARE

## 2022-02-14 ENCOUNTER — DOCUMENTATION ONLY (OUTPATIENT)
Dept: TRANSPLANT | Facility: CLINIC | Age: 57
End: 2022-02-14
Payer: MEDICARE

## 2022-02-14 LAB — G6PD QUANT: 337 (ref 127–427)

## 2022-02-15 ENCOUNTER — DOCUMENTATION ONLY (OUTPATIENT)
Dept: TRANSPLANT | Facility: CLINIC | Age: 57
End: 2022-02-15
Payer: MEDICARE

## 2022-02-15 LAB
EXT ALBUMIN: 3.7 (ref 3.7–5.3)
EXT BUN: 9 (ref 6–20)
EXT CALCIUM: 8.6 (ref 8.5–10.5)
EXT CHLORIDE: 108 (ref 101–112)
EXT CO2: 27 (ref 20–32)
EXT CREATININE: 1.07 MG/DL
EXT EOSINOPHIL%: 1.3
EXT GFR MDRD NON AF AMER: >60
EXT GLUCOSE: 103 (ref 74–106)
EXT HEMATOCRIT: 37.6 (ref 43.5–53.7)
EXT HEMOGLOBIN: 12 (ref 14.1–18.1)
EXT LYMPH%: 30.4
EXT MAGNESIUM: 1.5 (ref 1.3–2.1)
EXT MONOCYTES%: 4.6
EXT PHOSPHORUS: 2.9 (ref 2.7–4.5)
EXT PLATELETS: 337 (ref 142–424)
EXT POTASSIUM: 3.6 (ref 3.4–5.1)
EXT SEGS%: 63.4
EXT SODIUM: 144 MMOL/L (ref 135–145)
EXT TACROLIMUS LVL: 8.1
EXT WBC: 12.7 (ref 4.6–10.2)

## 2022-02-16 NOTE — PROGRESS NOTES
Kidney Post-Transplant Assessment    Referring Physician: Héctor Calvillo  Current Nephrologist: Héctor Calvillo    ORGAN: RIGHT KIDNEY  Donor Type: donation after brain death  PHS Increased Risk: no  Cold Ischemia: 1,221 mins  Induction Medications: simulect - basiliximab    Subjective:     CC:  Reassessment of renal allograft function and management of chronic immunosuppression.    HPI:  Mr. Valdez is a 56 y.o. 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 His most recent creatinine is 1.07. He takes mycophenolate mofetil, prednisone and tacrolimus for maintenance immunosuppression. His post transplant course has been uncomplicated to date.    Transplant History:  -ESRD secondary to DM.   -on PD until DBD KTX 12/25/21 (Simulect induction, CIT 20h 21m, KDPI 65%, CMV D+/R+).   -Per op note, small mass excised for biopsy at donor site and determined to be benign, but excision site left a 1.5-2cm wide superficial defect that caused expected bleeding after reperfusion that was unable to be sutured due to shape and risk of tearing parenchyma, therefore, evarrest patch applied with complete hemostasis    Interval History:    Overall feels well, no complaints today. Has been staying active without CP or SOB. Drinking 60-70 oz water daily, no issues with UOP. Mild LE edema, comes and goes. Home BPs 130/80. Tolerating IS without issues, no pain over the allograft.  Has noticed bruising and thinning skin.      Current Outpatient Medications   Medication Sig Dispense Refill    blood sugar diagnostic Strp 1 each by Misc.(Non-Drug; Combo Route) route 3 (three) times daily. 100 each 11    blood-glucose meter kit Use as instructed 1 each 0    cholecalciferol, vitamin D3, (VITAMIN D3) 50 mcg (2,000 unit) Tab Take 2,000 Units by mouth once daily.      cyanocobalamin (VITAMIN B-12) 1000 MCG tablet Take 1,000 mcg by mouth once daily.      dapsone 100 MG Tab Take 1 tablet  "(100 mg total) by mouth once daily. 30 tablet 4    docusate sodium (COLACE) 100 MG capsule Take 1 capsule (100 mg total) by mouth 3 (three) times daily as needed for Constipation. 30 capsule 0    insulin detemir U-100 (LEVEMIR FLEXTOUCH) 100 unit/mL (3 mL) SubQ InPn pen Inject 7 Units into the skin once daily. 15 mL 11    insulin lispro (HUMALOG KWIKPEN INSULIN) 100 unit/mL pen Inject 6 Units into the skin 3 (three) times daily with meals. Plus SSI: 180 - 230 + 2 unit; 231- 280  + 4 units; 281 - 330 + 6 units; 331 - 380 + 8 units; > 380   + 10 units. Max TDD 48 units. (Patient taking differently: Inject 7 Units into the skin 3 (three) times daily with meals. Plus SSI: 180 - 230 + 2 unit; 231- 280  + 4 units; 281 - 330 + 6 units; 331 - 380 + 8 units; > 380   + 10 units. Max TDD 48 units.) 15 mL 11    k phos di & mono-sod phos mono (K-PHOS-NEUTRAL) 250 mg Tab Take 2 tablets by mouth 2 (two) times a day. 120 tablet 11    lancets Misc 1 each by Misc.(Non-Drug; Combo Route) route 3 (three) times daily. 100 each 11    magnesium oxide (MAG-OX) 400 mg (241.3 mg magnesium) tablet Take 1 tablet (400 mg total) by mouth once daily. 30 tablet 11    multivitamin Tab Take 1 tablet by mouth once daily.      mycophenolate (CELLCEPT) 250 mg Cap Take 4 capsules (1,000 mg total) by mouth 2 (two) times daily. 240 capsule 11    NIFEdipine (PROCARDIA-XL) 30 MG (OSM) 24 hr tablet Take 1 tablet (30 mg total) by mouth 2 (two) times a day. 60 tablet 11    pantoprazole (PROTONIX) 40 MG tablet Take 1 tablet (40 mg total) by mouth once daily. 30 tablet 11    pen needle, diabetic (EASY COMFORT PEN NEEDLES) 32 gauge x 5/32" Ndle Inject 1 each into the skin 3 (three) times daily. 100 each 11    predniSONE (DELTASONE) 5 MG tablet Take 20 mg by mouth once daily from 12/28-1/27/22;  Take 15mg daily from 1/28-2/27;   Take 10mg daily from 2/28-3/27; Take 5 mg daily thereafter beginning 3/28/22 (Patient taking differently: Take 20 mg by mouth " once daily from 12/28-1/27/22;  Take 15mg daily from 1/28-2/27;   Take 10mg daily from 2/28-3/27; Take 5 mg daily thereafter beginning 3/28/22) 120 tablet 11    sodium bicarbonate 650 MG tablet Take 1 tablet (650 mg total) by mouth 2 (two) times daily. 60 tablet 11    tacrolimus (PROGRAF) 1 MG Cap Take 6 capsules (6 mg total) by mouth every morning AND 6 capsules (6 mg total) every evening. 1080 capsule 3    traMADoL (ULTRAM) 50 mg tablet Take 1 tablet (50 mg total) by mouth every 6 (six) hours as needed for Pain. 28 tablet 0    valGANciclovir (VALCYTE) 450 mg Tab Take 2 tablets (900 mg total) by mouth once daily. STOP 3/25/22 60 tablet 2     No current facility-administered medications for this visit.       Past Medical History:   Diagnosis Date    Chronic pulmonary embolism 6/1/2021    Diabetes mellitus     Diabetic nephropathy associated with type 2 diabetes mellitus 6/1/2021    Disorder of kidney and ureter     ESRD (end stage renal disease) 6/1/2021    Essential hypertension 6/1/2021    GERD (gastroesophageal reflux disease)     Mixed hyperlipidemia 6/1/2021    Sleep apnea 6/1/2021       Review of Systems   Constitutional: Negative for activity change, appetite change and fever.   HENT: Negative for congestion, mouth sores and sore throat.    Eyes: Positive for visual disturbance.        Wears glasses   Respiratory: Negative for cough, chest tightness and shortness of breath.    Cardiovascular: Negative for chest pain, palpitations and leg swelling.   Gastrointestinal: Negative for abdominal distention, abdominal pain, constipation, diarrhea and nausea.   Genitourinary: Negative for difficulty urinating, frequency and hematuria.   Musculoskeletal: Negative for arthralgias and gait problem.   Skin: Negative for wound.   Allergic/Immunologic: Positive for immunocompromised state. Negative for environmental allergies and food allergies.   Neurological: Negative for dizziness, weakness and numbness.    Psychiatric/Behavioral: Negative for sleep disturbance. The patient is not nervous/anxious.        Objective:   Blood pressure 120/61, pulse 96, temperature 97.3 °F (36.3 °C), temperature source Temporal, resp. rate 16, height 6' (1.829 m), weight 87.3 kg (192 lb 7.4 oz), SpO2 (!) 93 %.body mass index is 26.1 kg/m².    Physical Exam  Vitals and nursing note reviewed.   Constitutional:       Appearance: Normal appearance.   HENT:      Head: Normocephalic.   Cardiovascular:      Rate and Rhythm: Normal rate and regular rhythm.      Heart sounds: Normal heart sounds.   Pulmonary:      Effort: Pulmonary effort is normal.      Breath sounds: Normal breath sounds.   Abdominal:      General: Bowel sounds are normal. There is no distension.      Palpations: Abdomen is soft.      Tenderness: There is no abdominal tenderness.      Comments: No bruit noted over the allograft    Musculoskeletal:         General: Normal range of motion.   Skin:     General: Skin is warm and dry.   Neurological:      General: No focal deficit present.      Mental Status: He is alert.   Psychiatric:         Behavior: Behavior normal.         Labs:  Lab Results   Component Value Date    WBC 11.34 01/27/2022    HGB 11.7 (L) 01/27/2022    HCT 36.0 (L) 01/27/2022     01/27/2022    K 3.6 01/27/2022     01/27/2022    CO2 28 01/27/2022    BUN 14 01/27/2022    CREATININE 1.0 01/27/2022    EGFRNONAA >60.0 01/27/2022    CALCIUM 8.3 (L) 01/27/2022    PHOS 3.0 01/27/2022    MG 1.5 (L) 01/27/2022    ALBUMIN 3.3 (L) 01/27/2022    ALBUMIN 3.3 (L) 01/27/2022    AST 39 01/27/2022     (H) 01/27/2022    UTPCR Unable to calculate 01/24/2022    .5 (H) 12/24/2021    TACROLIMUS 7.1 01/27/2022       Lab Results   Component Value Date    EXTWBC 12.7 (A) 02/15/2022    EXTSEGS 63.4 02/15/2022    EXTPLATELETS 337 02/15/2022    EXTHEMOGLOBI 12 (A) 02/15/2022    EXTHEMATOCRI 37.6 (A) 02/15/2022    EXTCREATININ 1.07 02/15/2022    EXTSODIUM 144  02/15/2022    EXTPOTASSIUM 3.6 02/15/2022    EXTBUN 9 02/15/2022    EXTCO2 27 02/15/2022    EXTCALCIUM 8.6 02/15/2022    EXTPHOSPHORU 2.9 02/15/2022    EXTGLUCOSE 103 02/15/2022    EXTALBUMIN 3.7 02/15/2022    EXTAST 20 02/09/2022    EXTALT 72 (A) 02/09/2022    EXTBILITOTAL 0.8 02/09/2022       Lab Results   Component Value Date    EXTTACROLVL 8.1 02/15/2022       Labs were reviewed with the patient    Assessment:     1. S/P kidney transplant    2. Essential hypertension    3. Long-term use of immunosuppressant medication    4. Type 2 diabetes mellitus with stage 2 chronic kidney disease, with long-term current use of insulin    5. Secondary hyperparathyroidism of renal origin    6. At risk for opportunistic infections        Plan:     Sodium bicarb 650 mg BID --DC      Follow-up:   1. CKD stage: 2    2. Immunosuppression: Prograf trough 8.1, which is therapeutic (target 8-10). Continue Prograf 6/6, MMF 1000 mg BID, and Prednisone taper. Will continue to monitor for drug toxicities    3. Allograft Function: Stable. Continue good po hydration.    2/15/2022  POD 52   EXT Creatinine mg/dL 1.07   EXT GFR MDRD NON AF AMER >60       4. Hypertension management: advise low salt diet and home BP monitoring    nifedipine 30 mg BID    5. Metabolic Bone Disease/Secondary Hyperparathyroidism:stable  Will monitor PTH, CA and Vit D/guidelines  D3 2000 units QD,  mg BID, MagOx 400 mg QD   2/15/2022  POD 52   EXT Calcium 8.5 - 10.5 8.6   EXT Phosphorus 2.7 - 4.5 2.9   EXT Magnesium 1.3 - 2.1 1.5       6. Electrolytes:  Will monitor /guidelines   Sodium bicarb 650 mg BID --DC   2/15/2022  POD 52   EXT Sodium 135 - 145 mmol/L 144   EXT Potassium 3.4 - 5.1 3.6   EXT Chloride 101 - 112 108   EXT CO2 20 - 32 27     7. Anemia: stable. No need for intervention    2/15/2022  POD 52   EXT WBC 4.6 - 10.2 12.7 (A)   EXT Hemoglobin 14.1 - 18.1 12 (A)   EXT Hematocrit 43.5 - 53.7 37.6 (A)   EXT Platelets 142 - 424 337       8.   Cytopenias: no significant cytopenias will monitor as per our guidelines. Medicine list reviewed including potential causes of drug-induced cytopenias    9.Proteinuria: continue p/c ratio as per guidelines       1/24/2022  POD 30   Prot/Creat Ratio, Urine 0.00 - 0.20 Unable to calculate       10. BK virus infection screening:  will continue to monitor/ guidelines      1/24/2022  POD 30   BK Virus DNA, Blood Not detected Not detected   BK Virus DNA PCR, Quant, Blood <125 Copies/mL <125       11. Weight education: provided during the clinic visit   Body mass index is 26.1 kg/m².     12.Patient safety education regarding immunosuppression including prophylaxis posttransplant for CMV, PCP : Education provided about vaccination and prevention of infections     PCP PROPHYLAXIS: Dapsone until 6/25/22 (bactrim DC due to elevated ALT)   CMV PROPHYLAXIS: Valganciclovir until 3/29/22   FUNGAL PROPHYLAXIS: Posaconazole until 1/25/22-completed       Follow-up:   Clinic: return to transplant clinic weekly for the first month after transplant; every 2 weeks during months 2-3; then at 6-, 9-, 12-, 18-, 24-, and 36- months post-transplant to reassess for complications from immunosuppression toxicity and monitor for rejection.  Annually thereafter.    Labs: since patient remains at high risk for rejection and drug-related complications that warrant close monitoring, labs will be ordered as follows: continue twice weekly CBC, renal panel, and drug level for first month; then same labs once weekly through 3rd month post-transplant.  Urine for UA and protein/creatinine ratio monthly.  Serum BK - PCR at 1-, 3-, 6-, 9-, 12-, 18-, 24-, 36-, 48-, and 60 months post-transplant.  Hepatic panel at 1-, 2-, 3-, 6-, 9-, 12-, 18-, 24-, and 36- months post-transplant.    Sandra Luna NP       Education:   Material provided to the patient.  Patient reminded to call with any health changes since these can be early signs of significant  complications.  Also, I advised the patient to be sure any new medications or changes of old medications are discussed with either a pharmacist or physician knowledgeable with transplant to avoid rejection/drug toxicity related to significant drug interactions.    Patient advised that it is recommended that all transplanted patients, and their close contacts and household members receive Covid vaccination.

## 2022-02-17 ENCOUNTER — TELEPHONE (OUTPATIENT)
Dept: TRANSPLANT | Facility: CLINIC | Age: 57
End: 2022-02-17
Payer: MEDICARE

## 2022-02-17 ENCOUNTER — PATIENT MESSAGE (OUTPATIENT)
Dept: TRANSPLANT | Facility: CLINIC | Age: 57
End: 2022-02-17
Payer: MEDICARE

## 2022-02-21 ENCOUNTER — OFFICE VISIT (OUTPATIENT)
Dept: TRANSPLANT | Facility: CLINIC | Age: 57
End: 2022-02-21
Payer: MEDICARE

## 2022-02-21 VITALS
OXYGEN SATURATION: 93 % | HEIGHT: 72 IN | WEIGHT: 192.44 LBS | TEMPERATURE: 97 F | BODY MASS INDEX: 26.07 KG/M2 | DIASTOLIC BLOOD PRESSURE: 61 MMHG | RESPIRATION RATE: 16 BRPM | HEART RATE: 96 BPM | SYSTOLIC BLOOD PRESSURE: 120 MMHG

## 2022-02-21 DIAGNOSIS — Z91.89 AT RISK FOR OPPORTUNISTIC INFECTIONS: ICD-10-CM

## 2022-02-21 DIAGNOSIS — N18.2 TYPE 2 DIABETES MELLITUS WITH STAGE 2 CHRONIC KIDNEY DISEASE, WITH LONG-TERM CURRENT USE OF INSULIN: ICD-10-CM

## 2022-02-21 DIAGNOSIS — Z79.4 TYPE 2 DIABETES MELLITUS WITH STAGE 2 CHRONIC KIDNEY DISEASE, WITH LONG-TERM CURRENT USE OF INSULIN: ICD-10-CM

## 2022-02-21 DIAGNOSIS — N25.81 SECONDARY HYPERPARATHYROIDISM OF RENAL ORIGIN: ICD-10-CM

## 2022-02-21 DIAGNOSIS — I10 ESSENTIAL HYPERTENSION: ICD-10-CM

## 2022-02-21 DIAGNOSIS — E11.22 TYPE 2 DIABETES MELLITUS WITH STAGE 2 CHRONIC KIDNEY DISEASE, WITH LONG-TERM CURRENT USE OF INSULIN: ICD-10-CM

## 2022-02-21 DIAGNOSIS — Z94.0 S/P KIDNEY TRANSPLANT: Primary | ICD-10-CM

## 2022-02-21 DIAGNOSIS — Z79.60 LONG-TERM USE OF IMMUNOSUPPRESSANT MEDICATION: ICD-10-CM

## 2022-02-21 PROCEDURE — 99215 OFFICE O/P EST HI 40 MIN: CPT | Mod: PBBFAC | Performed by: NURSE PRACTITIONER

## 2022-02-21 PROCEDURE — 99999 PR PBB SHADOW E&M-EST. PATIENT-LVL V: ICD-10-PCS | Mod: PBBFAC,,, | Performed by: NURSE PRACTITIONER

## 2022-02-21 PROCEDURE — 99215 PR OFFICE/OUTPT VISIT, EST, LEVL V, 40-54 MIN: ICD-10-PCS | Mod: S$PBB,,, | Performed by: NURSE PRACTITIONER

## 2022-02-21 PROCEDURE — 99215 OFFICE O/P EST HI 40 MIN: CPT | Mod: S$PBB,,, | Performed by: NURSE PRACTITIONER

## 2022-02-21 PROCEDURE — 99999 PR PBB SHADOW E&M-EST. PATIENT-LVL V: CPT | Mod: PBBFAC,,, | Performed by: NURSE PRACTITIONER

## 2022-02-21 NOTE — PATIENT INSTRUCTIONS
STOP sodium bicarbonate    It is my privilege to participate in your transplant care! Please be sure to let us know if you have any questions or concerns about your health care - we cannot help you if we do not know. Don't forget we are on call 24/7 for any emergencies.      Best Wishes,  Sandra Luna NP-C

## 2022-02-21 NOTE — LETTER
February 21, 2022        Héctor Calvillo  [address]  [facility]  [place] [address]  Phone: [phone]  Fax: [phone]             [facility] Transplant 1st Fl  [address]  [facility] [address]  Phone: [phone]   Patient: Antwon Valdez   MR Number: [id number]   YOB: 1965   Date of Visit: 2/21/2022       Dear Dr. Héctor Calvillo    Thank you for referring Antwon Valdez to me for evaluation. Attached you will find relevant portions of my assessment and plan of care.    If you have questions, please do not hesitate to call me. I look forward to following Antwon Valdez along with you.    Sincerely,    Sandra Luna, NP    Enclosure    If you would like to receive this communication electronically, please contact [email or web address] or [phone] to request [employer] access.    [employer] is a tool which provides read-only access to select patient information with whom you have a relationship. Its easy to use and provides real time access to review your patients record including encounter summaries, notes, results, and demographic information.    If you feel you have received this communication in error or would no longer like to receive these types of communications, please e-mail [email or web address]

## 2022-02-23 ENCOUNTER — PATIENT MESSAGE (OUTPATIENT)
Dept: TRANSPLANT | Facility: CLINIC | Age: 57
End: 2022-02-23
Payer: MEDICARE

## 2022-02-24 ENCOUNTER — DOCUMENTATION ONLY (OUTPATIENT)
Dept: TRANSPLANT | Facility: CLINIC | Age: 57
End: 2022-02-24
Payer: MEDICARE

## 2022-02-24 LAB
EXT ALBUMIN: 3.5 (ref 3.7–5.3)
EXT ALKALINE PHOSPHATASE: 124 (ref 34–154)
EXT ALT: 22 (ref 7–55)
EXT AST: 14 (ref 8–35)
EXT BILIRUBIN DIRECT: 0.6 MG/DL (ref 0–0.4)
EXT BILIRUBIN TOTAL: 1.7 (ref 0.3–1.2)
EXT BK VIRUS DNA QN PCR: ABNORMAL
EXT BUN: 12 (ref 6–20)
EXT CALCIUM: 8.1 (ref 8.5–10.5)
EXT CHLORIDE: 108 (ref 101–112)
EXT CO2: 26 (ref 20–32)
EXT CREATININE: 1.01 MG/DL
EXT EOSINOPHIL%: 0.6
EXT GFR MDRD NON AF AMER: >60
EXT GLUCOSE: 119 (ref 74–106)
EXT HEMATOCRIT: 37.7 (ref 43.5–53.7)
EXT HEMOGLOBIN: 11.8 (ref 14.1–18.1)
EXT LYMPH%: 29
EXT MAGNESIUM: 1.3 (ref 1.3–2.1)
EXT MONOCYTES%: 5.2
EXT PHOSPHORUS: 2.5 (ref 2.7–4.5)
EXT PLATELETS: 317 (ref 142–424)
EXT POTASSIUM: 3.1 (ref 3.4–5.1)
EXT PROTEIN TOTAL: 5.8 (ref 6.4–8.3)
EXT SEGS%: 64.8
EXT SODIUM: 142 MMOL/L (ref 135–145)
EXT TACROLIMUS LVL: 8.5
EXT WBC: 12.5 (ref 4.6–10.2)

## 2022-02-28 ENCOUNTER — DOCUMENTATION ONLY (OUTPATIENT)
Dept: TRANSPLANT | Facility: CLINIC | Age: 57
End: 2022-02-28
Payer: MEDICARE

## 2022-02-28 LAB — EXT BK VIRUS DNA QN PCR: NEGATIVE

## 2022-03-03 ENCOUNTER — DOCUMENTATION ONLY (OUTPATIENT)
Dept: TRANSPLANT | Facility: CLINIC | Age: 57
End: 2022-03-03
Payer: MEDICARE

## 2022-03-03 ENCOUNTER — PATIENT MESSAGE (OUTPATIENT)
Dept: TRANSPLANT | Facility: CLINIC | Age: 57
End: 2022-03-03
Payer: MEDICARE

## 2022-03-03 LAB
EXT ALBUMIN: 3.5 (ref 3.7–5.3)
EXT BUN: 15 (ref 6–20)
EXT CALCIUM: 8.4 (ref 8.5–10.5)
EXT CHLORIDE: 108 (ref 101–112)
EXT CO2: 26 (ref 20–32)
EXT CREATININE: 0.98 MG/DL
EXT EOSINOPHIL%: 0.9
EXT GFR MDRD NON AF AMER: >60
EXT GLUCOSE: 108 (ref 74–106)
EXT HEMATOCRIT: 35.6 (ref 43.5–53.7)
EXT HEMOGLOBIN: 10.9 (ref 14.1–18.1)
EXT LYMPH%: 33.8
EXT MAGNESIUM: 1.4 (ref 1.3–2.1)
EXT MONOCYTES%: 4.6
EXT PHOSPHORUS: 3.3 (ref 2.7–4.5)
EXT PLATELETS: 316 (ref 142–424)
EXT POTASSIUM: 3.7 (ref 3.4–5.1)
EXT SEGS%: 60.2
EXT SODIUM: 143 MMOL/L (ref 135–145)
EXT TACROLIMUS LVL: 8.4
EXT WBC: 13.5 (ref 4.6–10.2)

## 2022-03-03 NOTE — PROGRESS NOTES
Kidney Post-Transplant Assessment    Referring Physician: Héctor Calvillo  Current Nephrologist: Héctor Calvillo    ORGAN: RIGHT KIDNEY  Donor Type: donation after brain death  PHS Increased Risk: no  Cold Ischemia: 1,221 mins  Induction Medications: simulect - basiliximab    Subjective:     CC:  Reassessment of renal allograft function and management of chronic immunosuppression.    HPI:  Mr. Valdez is a 56 y.o. 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 His most recent creatinine is 0.98. He takes mycophenolate mofetil, prednisone and tacrolimus for maintenance immunosuppression. His post transplant course has been uncomplicated to date.    Transplant History:  -ESRD secondary to DM.   -on PD until DBD KTX 12/25/21 (Simulect induction, CIT 20h 21m, KDPI 65%, CMV D+/R+).   -Per op note, small mass excised for biopsy at donor site and determined to be benign, but excision site left a 1.5-2cm wide superficial defect that caused expected bleeding after reperfusion that was unable to be sutured due to shape and risk of tearing parenchyma, therefore, evarrest patch applied with complete hemostasis    Interval History:  ALT has normalized following cessation of bactrim.    Has been having more of a short temper-likely secondary to prednisone.    Overall feels well, no complaints today. Has been staying active without CP or SOB. Drinking 58-62 oz water daily, no issues with UOP. No peripheral edema. Home BPs 120-130/70s. Tolerating IS without issues, no pain over the allograft.  Has noticed bruising and thinning skin.      Current Outpatient Medications   Medication Sig Dispense Refill    blood sugar diagnostic Strp 1 each by Misc.(Non-Drug; Combo Route) route 3 (three) times daily. 100 each 11    blood-glucose meter kit Use as instructed 1 each 0    cholecalciferol, vitamin D3, (VITAMIN D3) 50 mcg (2,000 unit) Tab Take 2,000 Units by mouth once daily.       "cyanocobalamin (VITAMIN B-12) 1000 MCG tablet Take 1,000 mcg by mouth once daily.      dapsone 100 MG Tab Take 1 tablet (100 mg total) by mouth once daily. 30 tablet 4    docusate sodium (COLACE) 100 MG capsule Take 1 capsule (100 mg total) by mouth 3 (three) times daily as needed for Constipation. 30 capsule 0    insulin detemir U-100 (LEVEMIR FLEXTOUCH) 100 unit/mL (3 mL) SubQ InPn pen Inject 7 Units into the skin once daily. 15 mL 11    insulin lispro (HUMALOG KWIKPEN INSULIN) 100 unit/mL pen Inject 6 Units into the skin 3 (three) times daily with meals. Plus SSI: 180 - 230 + 2 unit; 231- 280  + 4 units; 281 - 330 + 6 units; 331 - 380 + 8 units; > 380   + 10 units. Max TDD 48 units. (Patient taking differently: Inject 7 Units into the skin 3 (three) times daily with meals. Plus SSI: 180 - 230 + 2 unit; 231- 280  + 4 units; 281 - 330 + 6 units; 331 - 380 + 8 units; > 380   + 10 units. Max TDD 48 units.) 15 mL 11    k phos di & mono-sod phos mono (K-PHOS-NEUTRAL) 250 mg Tab Take 2 tablets by mouth 2 (two) times a day. 120 tablet 11    lancets Misc 1 each by Misc.(Non-Drug; Combo Route) route 3 (three) times daily. 100 each 11    magnesium oxide (MAG-OX) 400 mg (241.3 mg magnesium) tablet Take 1 tablet (400 mg total) by mouth once daily. 30 tablet 11    multivitamin Tab Take 1 tablet by mouth once daily.      mycophenolate (CELLCEPT) 250 mg Cap Take 4 capsules (1,000 mg total) by mouth 2 (two) times daily. 240 capsule 11    NIFEdipine (PROCARDIA-XL) 30 MG (OSM) 24 hr tablet Take 1 tablet (30 mg total) by mouth 2 (two) times a day. 60 tablet 11    pantoprazole (PROTONIX) 40 MG tablet Take 1 tablet (40 mg total) by mouth once daily. 30 tablet 11    pen needle, diabetic (EASY COMFORT PEN NEEDLES) 32 gauge x 5/32" Ndle Inject 1 each into the skin 3 (three) times daily. 100 each 11    predniSONE (DELTASONE) 5 MG tablet Take 20 mg by mouth once daily from 12/28-1/27/22;  Take 15mg daily from 1/28-2/27;   Take " 10mg daily from 2/28-3/27; Take 5 mg daily thereafter beginning 3/28/22 (Patient taking differently: Take 20 mg by mouth once daily from 12/28-1/27/22;  Take 15mg daily from 1/28-2/27;   Take 10mg daily from 2/28-3/27; Take 5 mg daily thereafter beginning 3/28/22) 120 tablet 11    tacrolimus (PROGRAF) 1 MG Cap Take 6 capsules (6 mg total) by mouth every morning AND 6 capsules (6 mg total) every evening. 1080 capsule 3    traMADoL (ULTRAM) 50 mg tablet Take 1 tablet (50 mg total) by mouth every 6 (six) hours as needed for Pain. 28 tablet 0    valGANciclovir (VALCYTE) 450 mg Tab Take 2 tablets (900 mg total) by mouth once daily. STOP 3/25/22 60 tablet 2     No current facility-administered medications for this visit.       Past Medical History:   Diagnosis Date    Chronic pulmonary embolism 6/1/2021    Diabetes mellitus     Diabetic nephropathy associated with type 2 diabetes mellitus 6/1/2021    Disorder of kidney and ureter     ESRD (end stage renal disease) 6/1/2021    Essential hypertension 6/1/2021    GERD (gastroesophageal reflux disease)     Mixed hyperlipidemia 6/1/2021    Sleep apnea 6/1/2021       Review of Systems   Constitutional: Negative for activity change, appetite change and fever.   HENT: Negative for congestion, mouth sores and sore throat.    Eyes: Positive for visual disturbance.        Wears glasses   Respiratory: Negative for cough, chest tightness and shortness of breath.    Cardiovascular: Negative for chest pain, palpitations and leg swelling.   Gastrointestinal: Negative for abdominal distention, abdominal pain, constipation, diarrhea and nausea.   Genitourinary: Negative for difficulty urinating, frequency and hematuria.   Musculoskeletal: Negative for arthralgias and gait problem.   Skin: Negative for wound.   Allergic/Immunologic: Positive for immunocompromised state. Negative for environmental allergies and food allergies.   Neurological: Negative for dizziness, weakness and  numbness.   Psychiatric/Behavioral: Negative for sleep disturbance. The patient is not nervous/anxious.        Objective:   Blood pressure 124/63, pulse 92, temperature 97.3 °F (36.3 °C), temperature source Tympanic, resp. rate 16, height 6' (1.829 m), weight 87.8 kg (193 lb 9 oz), SpO2 95 %.body mass index is 26.25 kg/m².    Physical Exam  Vitals and nursing note reviewed.   Constitutional:       Appearance: Normal appearance.   HENT:      Head: Normocephalic.   Cardiovascular:      Rate and Rhythm: Normal rate and regular rhythm.      Heart sounds: Normal heart sounds.   Pulmonary:      Effort: Pulmonary effort is normal.      Breath sounds: Normal breath sounds.   Abdominal:      General: Bowel sounds are normal. There is no distension.      Palpations: Abdomen is soft.      Tenderness: There is no abdominal tenderness.      Comments: No bruit noted over the allograft    Musculoskeletal:         General: Normal range of motion.   Skin:     General: Skin is warm and dry.   Neurological:      General: No focal deficit present.      Mental Status: He is alert.   Psychiatric:         Behavior: Behavior normal.         Labs:  Lab Results   Component Value Date    WBC 11.34 01/27/2022    HGB 11.7 (L) 01/27/2022    HCT 36.0 (L) 01/27/2022     01/27/2022    K 3.6 01/27/2022     01/27/2022    CO2 28 01/27/2022    BUN 14 01/27/2022    CREATININE 1.0 01/27/2022    EGFRNONAA >60.0 01/27/2022    CALCIUM 8.3 (L) 01/27/2022    PHOS 3.0 01/27/2022    MG 1.5 (L) 01/27/2022    ALBUMIN 3.3 (L) 01/27/2022    ALBUMIN 3.3 (L) 01/27/2022    AST 39 01/27/2022     (H) 01/27/2022    UTPCR Unable to calculate 01/24/2022    .5 (H) 12/24/2021    TACROLIMUS 7.1 01/27/2022       Lab Results   Component Value Date    EXTWBC 13.5 (A) 03/03/2022    EXTSEGS 60.2 03/03/2022    EXTPLATELETS 316 03/03/2022    EXTHEMOGLOBI 10.9 (A) 03/03/2022    EXTHEMATOCRI 35.6 (A) 03/03/2022    EXTCREATININ 0.98 03/03/2022    EXTSODIUM 143  03/03/2022    EXTPOTASSIUM 3.7 03/03/2022    EXTBUN 15 03/03/2022    EXTCO2 26 03/03/2022    EXTCALCIUM 8.4 (A) 03/03/2022    EXTPHOSPHORU 3.3 03/03/2022    EXTGLUCOSE 108 (A) 03/03/2022    EXTALBUMIN 3.5 (A) 03/03/2022    EXTAST 14 02/24/2022    EXTALT 22 02/24/2022    EXTBILITOTAL 1.7 (A) 02/24/2022       Lab Results   Component Value Date    EXTTACROLVL 8.4 03/03/2022       Labs were reviewed with the patient    Assessment:     1. S/P kidney transplant    2. CKD (chronic kidney disease), stage II    3. Essential hypertension    4. Diabetic nephropathy associated with type 2 diabetes mellitus    5. Long-term use of immunosuppressant medication    6. At risk for opportunistic infections        Plan:   Urine with next labwork    Follow-up:   1. CKD stage: 2    2. Immunosuppression: Prograf trough 8.4, which is therapeutic (target 8-10). Continue Prograf 6/6, MMF 1000 mg BID, and Prednisone taper. Will continue to monitor for drug toxicities    3. Allograft Function: Stable. Continue good po hydration.    3/3/2022  POD 68   EXT Creatinine mg/dL 0.98   EXT GFR MDRD NON AF AMER >60       4. Hypertension management: advise low salt diet and home BP monitoring    nifedipine 30 mg BID    5. Metabolic Bone Disease/Secondary Hyperparathyroidism:stable  Will monitor PTH, CA and Vit D/guidelines  D3 2000 units QD,  mg BID, MagOx 400 mg QD   3/3/2022  POD 68   EXT Calcium 8.5 - 10.5 8.4 (A)   EXT Phosphorus 2.7 - 4.5 3.3   EXT Magnesium 1.3 - 2.1 1.4       6. Electrolytes:  Will monitor /guidelines   3/3/2022  POD 68   EXT Sodium 135 - 145 mmol/L 143   EXT Potassium 3.4 - 5.1 3.7   EXT Chloride 101 - 112 108   EXT CO2 20 - 32 26     7. Anemia: stable. No need for intervention       3/3/2022  POD 68   EXT WBC 4.6 - 10.2 13.5 (A)   EXT Hemoglobin 14.1 - 18.1 10.9 (A)   EXT Hematocrit 43.5 - 53.7 35.6 (A)   EXT Platelets 142 - 424 316       8.  Cytopenias: no significant cytopenias will monitor as per our guidelines.  Medicine list reviewed including potential causes of drug-induced cytopenias    9.Proteinuria: continue p/c ratio as per guidelines       1/24/2022  POD 30   Prot/Creat Ratio, Urine 0.00 - 0.20 Unable to calculate       10. BK virus infection screening:  will continue to monitor/ guidelines       2/24/2022  POD 61   EXT BK Virus DNA QN PCR Pending       11. Weight education: provided during the clinic visit   Body mass index is 26.25 kg/m².     12.Patient safety education regarding immunosuppression including prophylaxis posttransplant for CMV, PCP : Education provided about vaccination and prevention of infections     PCP PROPHYLAXIS: Dapsone until 6/25/22 (bactrim DC due to elevated ALT)   CMV PROPHYLAXIS: Valganciclovir until 3/29/22   FUNGAL PROPHYLAXIS: Posaconazole until 1/25/22-completed       Follow-up:   Clinic: return to transplant clinic weekly for the first month after transplant; every 2 weeks during months 2-3; then at 6-, 9-, 12-, 18-, 24-, and 36- months post-transplant to reassess for complications from immunosuppression toxicity and monitor for rejection.  Annually thereafter.    Labs: since patient remains at high risk for rejection and drug-related complications that warrant close monitoring, labs will be ordered as follows: continue twice weekly CBC, renal panel, and drug level for first month; then same labs once weekly through 3rd month post-transplant.  Urine for UA and protein/creatinine ratio monthly.  Serum BK - PCR at 1-, 3-, 6-, 9-, 12-, 18-, 24-, 36-, 48-, and 60 months post-transplant.  Hepatic panel at 1-, 2-, 3-, 6-, 9-, 12-, 18-, 24-, and 36- months post-transplant.    Sandra Luna NP       Education:   Material provided to the patient.  Patient reminded to call with any health changes since these can be early signs of significant complications.  Also, I advised the patient to be sure any new medications or changes of old medications are discussed with either a pharmacist or  physician knowledgeable with transplant to avoid rejection/drug toxicity related to significant drug interactions.    Patient advised that it is recommended that all transplanted patients, and their close contacts and household members receive Covid vaccination.

## 2022-03-07 ENCOUNTER — PATIENT MESSAGE (OUTPATIENT)
Dept: TRANSPLANT | Facility: CLINIC | Age: 57
End: 2022-03-07

## 2022-03-07 ENCOUNTER — OFFICE VISIT (OUTPATIENT)
Dept: TRANSPLANT | Facility: CLINIC | Age: 57
End: 2022-03-07
Payer: MEDICARE

## 2022-03-07 VITALS
BODY MASS INDEX: 26.22 KG/M2 | WEIGHT: 193.56 LBS | TEMPERATURE: 97 F | OXYGEN SATURATION: 95 % | HEART RATE: 92 BPM | HEIGHT: 72 IN | RESPIRATION RATE: 16 BRPM | DIASTOLIC BLOOD PRESSURE: 63 MMHG | SYSTOLIC BLOOD PRESSURE: 124 MMHG

## 2022-03-07 DIAGNOSIS — I10 ESSENTIAL HYPERTENSION: ICD-10-CM

## 2022-03-07 DIAGNOSIS — N18.2 CKD (CHRONIC KIDNEY DISEASE), STAGE II: ICD-10-CM

## 2022-03-07 DIAGNOSIS — E11.21 DIABETIC NEPHROPATHY ASSOCIATED WITH TYPE 2 DIABETES MELLITUS: ICD-10-CM

## 2022-03-07 DIAGNOSIS — Z91.89 AT RISK FOR OPPORTUNISTIC INFECTIONS: ICD-10-CM

## 2022-03-07 DIAGNOSIS — Z94.0 S/P KIDNEY TRANSPLANT: Primary | ICD-10-CM

## 2022-03-07 DIAGNOSIS — Z79.60 LONG-TERM USE OF IMMUNOSUPPRESSANT MEDICATION: ICD-10-CM

## 2022-03-07 PROCEDURE — 99215 OFFICE O/P EST HI 40 MIN: CPT | Mod: PBBFAC | Performed by: NURSE PRACTITIONER

## 2022-03-07 PROCEDURE — 99999 PR PBB SHADOW E&M-EST. PATIENT-LVL V: CPT | Mod: PBBFAC,,, | Performed by: NURSE PRACTITIONER

## 2022-03-07 PROCEDURE — 99999 PR PBB SHADOW E&M-EST. PATIENT-LVL V: ICD-10-PCS | Mod: PBBFAC,,, | Performed by: NURSE PRACTITIONER

## 2022-03-07 PROCEDURE — 99215 PR OFFICE/OUTPT VISIT, EST, LEVL V, 40-54 MIN: ICD-10-PCS | Mod: S$PBB,,, | Performed by: NURSE PRACTITIONER

## 2022-03-07 PROCEDURE — 99215 OFFICE O/P EST HI 40 MIN: CPT | Mod: S$PBB,,, | Performed by: NURSE PRACTITIONER

## 2022-03-07 NOTE — PATIENT INSTRUCTIONS
It is my privilege to participate in your transplant care! Please be sure to let us know if you have any questions or concerns about your health care - we cannot help you if we do not know. Don't forget we are on call 24/7 for any emergencies.      Best Wishes,  Sandra Luna NP-C

## 2022-03-08 ENCOUNTER — DOCUMENTATION ONLY (OUTPATIENT)
Dept: TRANSPLANT | Facility: CLINIC | Age: 57
End: 2022-03-08
Payer: MEDICARE

## 2022-03-08 ENCOUNTER — TELEPHONE (OUTPATIENT)
Dept: TRANSPLANT | Facility: CLINIC | Age: 57
End: 2022-03-08
Payer: MEDICARE

## 2022-03-08 ENCOUNTER — PATIENT MESSAGE (OUTPATIENT)
Dept: TRANSPLANT | Facility: CLINIC | Age: 57
End: 2022-03-08
Payer: MEDICARE

## 2022-03-08 LAB
EXT ALBUMIN: 3.7 (ref 3.7–5.3)
EXT BACTERIA UA: NORMAL
EXT BUN: 16 (ref 6–20)
EXT CALCIUM: 8.9 (ref 8.5–10.5)
EXT CHLORIDE: 107 (ref 101–112)
EXT CO2: 25 (ref 20–32)
EXT CREATININE UA: 50.8
EXT CREATININE: 1.12 MG/DL
EXT EOSINOPHIL%: 0.8
EXT GFR MDRD NON AF AMER: >60
EXT GLUCOSE UA: NORMAL
EXT GLUCOSE: 98 (ref 74–106)
EXT HEMATOCRIT: 35.2 (ref 43.5–53.7)
EXT HEMOGLOBIN: 10.8 (ref 14.1–18.1)
EXT LYMPH%: 31.8
EXT MAGNESIUM: 1.5 (ref 1.3–2.1)
EXT MONOCYTES%: 5.2
EXT NITRITES UA: NORMAL
EXT PHOSPHORUS: 2.9 (ref 2.7–4.5)
EXT PLATELETS: 313 (ref 142–424)
EXT POTASSIUM: 3.9 (ref 3.4–5.1)
EXT PROT/CREAT RATIO UR: 0.24
EXT PROTEIN UA: NORMAL
EXT RBC UA: NORMAL
EXT SEGS%: 61.6
EXT SODIUM: 142 MMOL/L (ref 135–145)
EXT TACROLIMUS LVL: 9.1
EXT UR LEUK ESTERASE: NORMAL
EXT URINE APPEARANCE: CLEAR
EXT URINE BILLIRUBIN: NORMAL
EXT URINE COLOR: YELLOW
EXT URINE KETONES: NORMAL
EXT URINE OCCULT BLOOD: NORMAL
EXT URINE PH: 6 (ref 5–6)
EXT URINE PROTEIN: 12.4
EXT URINE SP GRAVITY: 1.02 (ref 1.02–1.02)
EXT URINE UROBILLINOGEN: 0.2 (ref 0.2–1)
EXT WBC UA: NORMAL
EXT WBC: 10.8 (ref 4.6–10.2)

## 2022-03-08 NOTE — TELEPHONE ENCOUNTER
Vasile msg sent to pt.----- Message from Guerline Goel MD sent at 3/8/2022  1:03 PM CST -----  Available results reviewed and require no immediate action.

## 2022-03-14 ENCOUNTER — PATIENT MESSAGE (OUTPATIENT)
Dept: TRANSPLANT | Facility: CLINIC | Age: 57
End: 2022-03-14
Payer: MEDICARE

## 2022-03-15 ENCOUNTER — TELEPHONE (OUTPATIENT)
Dept: TRANSPLANT | Facility: CLINIC | Age: 57
End: 2022-03-15
Payer: MEDICARE

## 2022-03-15 ENCOUNTER — DOCUMENTATION ONLY (OUTPATIENT)
Dept: TRANSPLANT | Facility: CLINIC | Age: 57
End: 2022-03-15
Payer: MEDICARE

## 2022-03-15 ENCOUNTER — PATIENT MESSAGE (OUTPATIENT)
Dept: TRANSPLANT | Facility: CLINIC | Age: 57
End: 2022-03-15
Payer: MEDICARE

## 2022-03-15 LAB
EXT ALBUMIN: 3.7 (ref 3.7–5.3)
EXT BUN: 10 (ref 6–20)
EXT CALCIUM: 8.6 (ref 8.5–10.5)
EXT CHLORIDE: 108 (ref 101–112)
EXT CO2: 24 (ref 20–32)
EXT CREATININE: 1.06 MG/DL
EXT EOSINOPHIL%: 1.1
EXT GFR MDRD NON AF AMER: >60
EXT GLUCOSE: 105 (ref 74–106)
EXT HEMATOCRIT: 35 (ref 43.5–53.7)
EXT HEMOGLOBIN: 10.4 (ref 14.1–18.1)
EXT LYMPH%: 33.7
EXT MAGNESIUM: 1.4 (ref 1.3–2.1)
EXT MONOCYTES%: 4.9
EXT PHOSPHORUS: 2.6 (ref 2.7–4.5)
EXT PLATELETS: 313 (ref 142–424)
EXT POTASSIUM: 3.8 (ref 3.4–5.1)
EXT SEGS%: 59.7
EXT SODIUM: 141 MMOL/L (ref 135–145)
EXT TACROLIMUS LVL: 33.4
EXT TACROLIMUS LVL: ABNORMAL
EXT WBC: 10.9 (ref 4.6–10.2)

## 2022-03-16 ENCOUNTER — DOCUMENTATION ONLY (OUTPATIENT)
Dept: TRANSPLANT | Facility: CLINIC | Age: 57
End: 2022-03-16
Payer: MEDICARE

## 2022-03-16 ENCOUNTER — PATIENT MESSAGE (OUTPATIENT)
Dept: TRANSPLANT | Facility: CLINIC | Age: 57
End: 2022-03-16
Payer: MEDICARE

## 2022-03-16 LAB — EXT TACROLIMUS LVL: 9.8

## 2022-03-17 NOTE — PROGRESS NOTES
Kidney Post-Transplant Assessment    Referring Physician: Héctor Calvillo  Current Nephrologist: Héctor Calvillo    ORGAN: RIGHT KIDNEY  Donor Type: donation after brain death  PHS Increased Risk: no  Cold Ischemia: 1,221 mins  Induction Medications: simulect - basiliximab    Subjective:     CC:  Reassessment of renal allograft function and management of chronic immunosuppression.    HPI:  Mr. Valdez is a 56 y.o. 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 His most recent creatinine is 1.06. He takes mycophenolate mofetil, prednisone and tacrolimus for maintenance immunosuppression. His post transplant course has been uncomplicated to date.    Transplant History:  -ESRD secondary to DM.   -on PD until DBD KTX 12/25/21 (Simulect induction, CIT 20h 21m, KDPI 65%, CMV D+/R+).   -Per op note, small mass excised for biopsy at donor site and determined to be benign, but excision site left a 1.5-2cm wide superficial defect that caused expected bleeding after reperfusion that was unable to be sutured due to shape and risk of tearing parenchyma, therefore, evarrest patch applied with complete hemostasis    Interval History:  Reports irritability and mild hand tremors-both annoying but tolerable.    Has been drinking at least 2 L water daily, no issues with UOP. Staying active without CP or SOB. Looking forward to getting back outdoors for hunting and fishing.     Planning to re-establish care with general nephrology Dr. Calvillo in the future.      Current Outpatient Medications   Medication Sig Dispense Refill    blood sugar diagnostic Strp 1 each by Misc.(Non-Drug; Combo Route) route 3 (three) times daily. 100 each 11    blood-glucose meter kit Use as instructed 1 each 0    cholecalciferol, vitamin D3, (VITAMIN D3) 50 mcg (2,000 unit) Tab Take 2,000 Units by mouth once daily.      cyanocobalamin (VITAMIN B-12) 1000 MCG tablet Take 1,000 mcg by mouth once  "daily.      dapsone 100 MG Tab Take 1 tablet (100 mg total) by mouth once daily. 30 tablet 4    docusate sodium (COLACE) 100 MG capsule Take 1 capsule (100 mg total) by mouth 3 (three) times daily as needed for Constipation. 30 capsule 0    insulin detemir U-100 (LEVEMIR FLEXTOUCH) 100 unit/mL (3 mL) SubQ InPn pen Inject 7 Units into the skin once daily. 15 mL 11    insulin lispro (HUMALOG KWIKPEN INSULIN) 100 unit/mL pen Inject 6 Units into the skin 3 (three) times daily with meals. Plus SSI: 180 - 230 + 2 unit; 231- 280  + 4 units; 281 - 330 + 6 units; 331 - 380 + 8 units; > 380   + 10 units. Max TDD 48 units. (Patient taking differently: Inject 12 Units into the skin 3 (three) times daily with meals. Plus SSI: 180 - 230 + 2 unit; 231- 280  + 4 units; 281 - 330 + 6 units; 331 - 380 + 8 units; > 380   + 10 units. Max TDD 48 units.) 15 mL 11    k phos di & mono-sod phos mono (K-PHOS-NEUTRAL) 250 mg Tab Take 2 tablets by mouth 2 (two) times a day. 120 tablet 11    lancets Misc 1 each by Misc.(Non-Drug; Combo Route) route 3 (three) times daily. 100 each 11    magnesium oxide (MAG-OX) 400 mg (241.3 mg magnesium) tablet Take 1 tablet (400 mg total) by mouth once daily. 30 tablet 11    multivitamin Tab Take 1 tablet by mouth once daily.      mycophenolate (CELLCEPT) 250 mg Cap Take 4 capsules (1,000 mg total) by mouth 2 (two) times daily. 240 capsule 11    NIFEdipine (PROCARDIA-XL) 30 MG (OSM) 24 hr tablet Take 1 tablet (30 mg total) by mouth 2 (two) times a day. 60 tablet 11    pantoprazole (PROTONIX) 40 MG tablet Take 1 tablet (40 mg total) by mouth once daily. 30 tablet 11    pen needle, diabetic (EASY COMFORT PEN NEEDLES) 32 gauge x 5/32" Ndle Inject 1 each into the skin 3 (three) times daily. 100 each 11    predniSONE (DELTASONE) 5 MG tablet Take 20 mg by mouth once daily from 12/28-1/27/22;  Take 15mg daily from 1/28-2/27;   Take 10mg daily from 2/28-3/27; Take 5 mg daily thereafter beginning 3/28/22 " (Patient taking differently: Take 20 mg by mouth once daily from 12/28-1/27/22;  Take 15mg daily from 1/28-2/27;   Take 10mg daily from 2/28-3/27; Take 5 mg daily thereafter beginning 3/28/22) 120 tablet 11    tacrolimus (PROGRAF) 1 MG Cap Take 6 capsules (6 mg total) by mouth every morning AND 6 capsules (6 mg total) every evening. 1080 capsule 3    valGANciclovir (VALCYTE) 450 mg Tab Take 2 tablets (900 mg total) by mouth once daily. STOP 3/25/22 60 tablet 2    traMADoL (ULTRAM) 50 mg tablet Take 1 tablet (50 mg total) by mouth every 6 (six) hours as needed for Pain. (Patient not taking: Reported on 3/21/2022) 28 tablet 0     No current facility-administered medications for this visit.       Past Medical History:   Diagnosis Date    Chronic pulmonary embolism 6/1/2021    Diabetes mellitus     Diabetic nephropathy associated with type 2 diabetes mellitus 6/1/2021    Disorder of kidney and ureter     ESRD (end stage renal disease) 6/1/2021    Essential hypertension 6/1/2021    GERD (gastroesophageal reflux disease)     Mixed hyperlipidemia 6/1/2021    Sleep apnea 6/1/2021       Review of Systems   Constitutional: Negative for activity change, appetite change and fever.   HENT: Negative for congestion, mouth sores and sore throat.    Eyes: Positive for visual disturbance.        Wears glasses   Respiratory: Negative for cough, chest tightness and shortness of breath.    Cardiovascular: Negative for chest pain, palpitations and leg swelling.   Gastrointestinal: Negative for abdominal distention, abdominal pain, constipation, diarrhea and nausea.   Genitourinary: Negative for difficulty urinating, frequency and hematuria.   Musculoskeletal: Negative for arthralgias and gait problem.   Skin: Negative for wound.   Allergic/Immunologic: Positive for immunocompromised state. Negative for environmental allergies and food allergies.   Neurological: Negative for dizziness, weakness and numbness.    Psychiatric/Behavioral: Negative for sleep disturbance. The patient is not nervous/anxious.        Objective:   Blood pressure 116/69, pulse 90, temperature 97.3 °F (36.3 °C), temperature source Tympanic, resp. rate 16, height 6' (1.829 m), weight 89.4 kg (197 lb 1.5 oz), SpO2 95 %.body mass index is 26.73 kg/m².    Physical Exam  Vitals and nursing note reviewed.   Constitutional:       Appearance: Normal appearance.   HENT:      Head: Normocephalic.   Cardiovascular:      Rate and Rhythm: Normal rate and regular rhythm.      Heart sounds: Normal heart sounds.   Pulmonary:      Effort: Pulmonary effort is normal.      Breath sounds: Normal breath sounds.   Abdominal:      General: Bowel sounds are normal. There is no distension.      Palpations: Abdomen is soft.      Tenderness: There is no abdominal tenderness.      Comments: No bruit noted over the allograft    Musculoskeletal:         General: Normal range of motion.   Skin:     General: Skin is warm and dry.   Neurological:      General: No focal deficit present.      Mental Status: He is alert.   Psychiatric:         Behavior: Behavior normal.         Labs:  Lab Results   Component Value Date    WBC 11.34 01/27/2022    HGB 11.7 (L) 01/27/2022    HCT 36.0 (L) 01/27/2022     01/27/2022    K 3.6 01/27/2022     01/27/2022    CO2 28 01/27/2022    BUN 14 01/27/2022    CREATININE 1.0 01/27/2022    EGFRNONAA >60.0 01/27/2022    CALCIUM 8.3 (L) 01/27/2022    PHOS 3.0 01/27/2022    MG 1.5 (L) 01/27/2022    ALBUMIN 3.3 (L) 01/27/2022    ALBUMIN 3.3 (L) 01/27/2022    AST 39 01/27/2022     (H) 01/27/2022    UTPCR Unable to calculate 01/24/2022    .5 (H) 12/24/2021    TACROLIMUS 7.1 01/27/2022       Lab Results   Component Value Date    EXTWBC 10.9 (A) 03/15/2022    EXTSEGS 59.7 03/15/2022    EXTPLATELETS 313 03/15/2022    EXTHEMOGLOBI 10.4 (A) 03/15/2022    EXTHEMATOCRI 35.0 (A) 03/15/2022    EXTCREATININ 1.06 03/15/2022    EXTSODIUM 141  03/15/2022    EXTPOTASSIUM 3.8 03/15/2022    EXTBUN 10 03/15/2022    EXTCO2 24 03/15/2022    EXTCALCIUM 8.6 03/15/2022    EXTPHOSPHORU 2.6 (A) 03/15/2022    EXTGLUCOSE 105 03/15/2022    EXTALBUMIN 3.7 03/15/2022    EXTAST 14 02/24/2022    EXTALT 22 02/24/2022    EXTBILITOTAL 1.7 (A) 02/24/2022       Lab Results   Component Value Date    EXTTACROLVL 9.8 03/16/2022    EXTPROTCRE 0.24 03/08/2022    EXTPROTEINUA NEG 03/08/2022    EXTWBCUA 0-2 03/08/2022    EXTRBCUA NONE SEEN 03/08/2022       Labs were reviewed with the patient    Assessment:     1. S/P kidney transplant    2. CKD (chronic kidney disease), stage II    3. Essential hypertension    4. Long-term use of immunosuppressant medication    5. Type 2 diabetes mellitus with stage 2 chronic kidney disease, with long-term current use of insulin    6. At risk for opportunistic infections        Plan:     Follow-up:   1. CKD stage: 2    2. Immunosuppression: Prograf trough 9.8, which is therapeutic (target 8-10). Continue Prograf 6/6, MMF 1000 mg BID, and Prednisone taper. Will continue to monitor for drug toxicities    3. Allograft Function: Stable. Continue good po hydration.    3/15/2022  POD 80   EXT Creatinine mg/dL 1.06   EXT GFR MDRD NON AF AMER >60       4. Hypertension management: advise low salt diet and home BP monitoring    nifedipine 30 mg BID    5. Metabolic Bone Disease/Secondary Hyperparathyroidism:stable  Will monitor PTH, CA and Vit D/guidelines  D3 2000 units QD,  mg BID      3/15/2022  POD 80   EXT Calcium 8.5 - 10.5 8.6   EXT Phosphorus 2.7 - 4.5 2.6 (A)   EXT Magnesium 1.3 - 2.1 1.4       6. Electrolytes:  Will monitor /guidelines   3/15/2022  POD 80   EXT Sodium 135 - 145 mmol/L 141   EXT Potassium 3.4 - 5.1 3.8   EXT Chloride 101 - 112 108   EXT CO2 20 - 32 24     7. Anemia: stable. No need for intervention    3/15/2022  POD 80   EXT WBC 4.6 - 10.2 10.9 (A)   EXT Hemoglobin 14.1 - 18.1 10.4 (A)   EXT Hematocrit 43.5 - 53.7 35.0 (A)   EXT  Platelets 142 - 424 313       8.  Cytopenias: no significant cytopenias will monitor as per our guidelines. Medicine list reviewed including potential causes of drug-induced cytopenias    9.Proteinuria: continue p/c ratio as per guidelines    3/8/2022  POD 73   EXT PROT/CREAT Ratio UR 0.24       10. BK virus infection screening:  will continue to monitor/ guidelines   2/24/2022  POD 61   EXT BK Virus DNA QN PCR Pending       11. Weight education: provided during the clinic visit   Body mass index is 26.73 kg/m².     12.Patient safety education regarding immunosuppression including prophylaxis posttransplant for CMV, PCP : Education provided about vaccination and prevention of infections     PCP PROPHYLAXIS: Dapsone until 6/25/22 (bactrim DC due to elevated ALT)   CMV PROPHYLAXIS: Valganciclovir until 3/29/22   FUNGAL PROPHYLAXIS: Posaconazole until 1/25/22-completed       Follow-up:   Clinic: return to transplant clinic weekly for the first month after transplant; every 2 weeks during months 2-3; then at 6-, 9-, 12-, 18-, 24-, and 36- months post-transplant to reassess for complications from immunosuppression toxicity and monitor for rejection.  Annually thereafter.    Labs: since patient remains at high risk for rejection and drug-related complications that warrant close monitoring, labs will be ordered as follows: continue twice weekly CBC, renal panel, and drug level for first month; then same labs once weekly through 3rd month post-transplant.  Urine for UA and protein/creatinine ratio monthly.  Serum BK - PCR at 1-, 3-, 6-, 9-, 12-, 18-, 24-, 36-, 48-, and 60 months post-transplant.  Hepatic panel at 1-, 2-, 3-, 6-, 9-, 12-, 18-, 24-, and 36- months post-transplant.    Sandra Luna NP       Education:   Material provided to the patient.  Patient reminded to call with any health changes since these can be early signs of significant complications.  Also, I advised the patient to be sure any new medications or  changes of old medications are discussed with either a pharmacist or physician knowledgeable with transplant to avoid rejection/drug toxicity related to significant drug interactions.    Patient advised that it is recommended that all transplanted patients, and their close contacts and household members receive Covid vaccination.

## 2022-03-21 ENCOUNTER — OFFICE VISIT (OUTPATIENT)
Dept: TRANSPLANT | Facility: CLINIC | Age: 57
End: 2022-03-21
Payer: MEDICARE

## 2022-03-21 VITALS
DIASTOLIC BLOOD PRESSURE: 69 MMHG | HEIGHT: 72 IN | OXYGEN SATURATION: 95 % | SYSTOLIC BLOOD PRESSURE: 116 MMHG | HEART RATE: 90 BPM | TEMPERATURE: 97 F | RESPIRATION RATE: 16 BRPM | BODY MASS INDEX: 26.69 KG/M2 | WEIGHT: 197.06 LBS

## 2022-03-21 DIAGNOSIS — N18.2 CKD (CHRONIC KIDNEY DISEASE), STAGE II: ICD-10-CM

## 2022-03-21 DIAGNOSIS — Z79.4 TYPE 2 DIABETES MELLITUS WITH STAGE 2 CHRONIC KIDNEY DISEASE, WITH LONG-TERM CURRENT USE OF INSULIN: ICD-10-CM

## 2022-03-21 DIAGNOSIS — I10 ESSENTIAL HYPERTENSION: ICD-10-CM

## 2022-03-21 DIAGNOSIS — Z94.0 S/P KIDNEY TRANSPLANT: Primary | ICD-10-CM

## 2022-03-21 DIAGNOSIS — Z91.89 AT RISK FOR OPPORTUNISTIC INFECTIONS: ICD-10-CM

## 2022-03-21 DIAGNOSIS — Z79.60 LONG-TERM USE OF IMMUNOSUPPRESSANT MEDICATION: ICD-10-CM

## 2022-03-21 DIAGNOSIS — N18.2 TYPE 2 DIABETES MELLITUS WITH STAGE 2 CHRONIC KIDNEY DISEASE, WITH LONG-TERM CURRENT USE OF INSULIN: ICD-10-CM

## 2022-03-21 DIAGNOSIS — E11.22 TYPE 2 DIABETES MELLITUS WITH STAGE 2 CHRONIC KIDNEY DISEASE, WITH LONG-TERM CURRENT USE OF INSULIN: ICD-10-CM

## 2022-03-21 PROCEDURE — 99215 OFFICE O/P EST HI 40 MIN: CPT | Mod: S$PBB,,, | Performed by: NURSE PRACTITIONER

## 2022-03-21 PROCEDURE — 99215 OFFICE O/P EST HI 40 MIN: CPT | Mod: PBBFAC | Performed by: NURSE PRACTITIONER

## 2022-03-21 PROCEDURE — 99999 PR PBB SHADOW E&M-EST. PATIENT-LVL V: ICD-10-PCS | Mod: PBBFAC,,, | Performed by: NURSE PRACTITIONER

## 2022-03-21 PROCEDURE — 99215 PR OFFICE/OUTPT VISIT, EST, LEVL V, 40-54 MIN: ICD-10-PCS | Mod: S$PBB,,, | Performed by: NURSE PRACTITIONER

## 2022-03-21 PROCEDURE — 99999 PR PBB SHADOW E&M-EST. PATIENT-LVL V: CPT | Mod: PBBFAC,,, | Performed by: NURSE PRACTITIONER

## 2022-03-21 NOTE — PATIENT INSTRUCTIONS
STOP MagOx    It is my privilege to participate in your transplant care! Please be sure to let us know if you have any questions or concerns about your health care - we cannot help you if we do not know. Don't forget we are on call 24/7 for any emergencies.      Best Wishes,  Sandra Luna NP-C

## 2022-03-23 ENCOUNTER — DOCUMENTATION ONLY (OUTPATIENT)
Dept: TRANSPLANT | Facility: CLINIC | Age: 57
End: 2022-03-23
Payer: MEDICARE

## 2022-03-23 ENCOUNTER — PATIENT MESSAGE (OUTPATIENT)
Dept: TRANSPLANT | Facility: CLINIC | Age: 57
End: 2022-03-23
Payer: MEDICARE

## 2022-03-23 LAB
EXT ALBUMIN: 3.9 (ref 3.7–5.3)
EXT ALKALINE PHOSPHATASE: 122 (ref 34–154)
EXT ALT: 23 (ref 7–55)
EXT AST: 17 (ref 8–35)
EXT BILIRUBIN DIRECT: 0.6 MG/DL (ref 0–0.4)
EXT BILIRUBIN TOTAL: 1.6 (ref 0.3–1.2)
EXT BK VIRUS DNA QN PCR: ABNORMAL
EXT BUN: 15 (ref 6–20)
EXT CALCIUM: 8.7 (ref 8.5–10.5)
EXT CHLORIDE: 108 (ref 101–112)
EXT CHOLESTEROL: 146 (ref 140–199)
EXT CO2: 25 (ref 20–32)
EXT CREATININE UA: 68
EXT CREATININE: 1.14 MG/DL
EXT EOSINOPHIL%: 1.3
EXT GFR MDRD NON AF AMER: >60
EXT GLUCOSE: 98 (ref 74–106)
EXT HDL: 54 (ref 41–75)
EXT HEMATOCRIT: 36.5 (ref 43.5–53.7)
EXT HEMOGLOBIN: 11 (ref 14.1–18.1)
EXT LDL CHOLESTEROL: 62 MG/DL
EXT LYMPH%: 32.3
EXT MAGNESIUM: 1.5 (ref 1.3–2.1)
EXT MONOCYTES%: 5
EXT NITRITES UA: ABNORMAL
EXT PHOSPHORUS: 2.9 (ref 2.7–4.5)
EXT PLATELETS: 340 (ref 142–424)
EXT POTASSIUM: 3.8 (ref 3.4–5.1)
EXT PROT/CREAT RATIO UR: 0.24
EXT PROTEIN TOTAL: 6.3 (ref 6.4–8.3)
EXT PROTEIN UA: 16
EXT SEGS%: 61
EXT SODIUM: 143 MMOL/L (ref 135–145)
EXT TACROLIMUS LVL: 9.7
EXT TRIGLYCERIDES: 184 (ref 35–159)
EXT UR LEUK ESTERASE: ABNORMAL
EXT URIC ACID: 6.2 (ref 2.6–7.2)
EXT URINE APPEARANCE: CLEAR
EXT URINE BACTERIA: ABNORMAL
EXT URINE BILLIRUBIN: ABNORMAL
EXT URINE COLOR: YELLOW
EXT URINE GLUCOSE: ABNORMAL
EXT URINE KETONES: ABNORMAL
EXT URINE OCCULT BLOOD: ABNORMAL
EXT URINE PH: 6 (ref 5–6)
EXT URINE PROTEIN: ABNORMAL
EXT URINE SP GRAVITY: 1.02 (ref 1.02–1.02)
EXT URINE UROBILLINOGEN: 0.2 (ref 0.2–1)
EXT WBC UA: ABNORMAL
EXT WBC: 12.7 (ref 4.6–10.2)

## 2022-03-28 ENCOUNTER — DOCUMENTATION ONLY (OUTPATIENT)
Dept: TRANSPLANT | Facility: CLINIC | Age: 57
End: 2022-03-28
Payer: MEDICARE

## 2022-03-28 LAB
EXT BK VIRUS DNA QN PCR: NEGATIVE
EXT PTH, INTACT: 171.1 (ref 11.1–79.5)

## 2022-04-04 ENCOUNTER — PATIENT MESSAGE (OUTPATIENT)
Dept: TRANSPLANT | Facility: CLINIC | Age: 57
End: 2022-04-04
Payer: MEDICARE

## 2022-04-04 ENCOUNTER — TELEPHONE (OUTPATIENT)
Dept: TRANSPLANT | Facility: CLINIC | Age: 57
End: 2022-04-04
Payer: MEDICARE

## 2022-04-04 NOTE — TELEPHONE ENCOUNTER
Spoke with pt regarding [employer] messages sent. Pt is having US tomorrow to check for blood clot in leg. Pt is anxious and concerned with losing his kidney. Encouraged pt to go to ED if a fever persists or any symptoms persist. Pt stated he was fine now.Pt has labs later this month.

## 2022-04-07 RX ORDER — RIVAROXABAN 15 MG-20MG
KIT ORAL
COMMUNITY
Start: 2022-04-07

## 2022-04-08 ENCOUNTER — PATIENT MESSAGE (OUTPATIENT)
Dept: TRANSPLANT | Facility: CLINIC | Age: 57
End: 2022-04-08
Payer: MEDICARE

## 2022-04-12 ENCOUNTER — DOCUMENTATION ONLY (OUTPATIENT)
Dept: PHARMACY | Facility: HOSPITAL | Age: 57
End: 2022-04-12
Payer: MEDICARE

## 2022-04-12 ENCOUNTER — DOCUMENTATION ONLY (OUTPATIENT)
Dept: TRANSPLANT | Facility: CLINIC | Age: 57
End: 2022-04-12
Payer: MEDICARE

## 2022-04-12 ENCOUNTER — TELEPHONE (OUTPATIENT)
Dept: TRANSPLANT | Facility: CLINIC | Age: 57
End: 2022-04-12
Payer: MEDICARE

## 2022-04-12 LAB
EXT ALBUMIN: 3.8 (ref 3.7–5.3)
EXT BUN: 21 (ref 6–20)
EXT CALCIUM: 8.7 (ref 8.5–10.5)
EXT CHLORIDE: 108 (ref 101–112)
EXT CO2: 24 (ref 20–32)
EXT CREATININE: 1.15 MG/DL
EXT EOSINOPHIL%: 2.2
EXT GLUCOSE: 112 (ref 74–106)
EXT HEMATOCRIT: 36 (ref 43.5–53.7)
EXT HEMOGLOBIN: 10.7 (ref 14.1–18.1)
EXT LYMPH%: 37.8
EXT MAGNESIUM: 1.8 (ref 1.3–2.1)
EXT MONOCYTES%: 15.9
EXT PHOSPHORUS: 2.9 (ref 2.7–4.5)
EXT PLATELETS: 394 (ref 142–424)
EXT POTASSIUM: 4 (ref 3.4–5.1)
EXT SEGS%: 43.5
EXT SODIUM: 142 MMOL/L (ref 135–145)
EXT TACROLIMUS LVL: 11.1
EXT WBC: 7.9 (ref 4.6–10.2)

## 2022-04-12 NOTE — TELEPHONE ENCOUNTER
Vasile msg sent to pt regarding below. Tacro dose to 5/5. Labs in 2 weeks.----- Message from Guerline Goel MD sent at 4/12/2022  1:28 PM CDT -----  Lower tacro to 5 mg BID; recheck tacro level in 2 weeks

## 2022-04-13 ENCOUNTER — DOCUMENTATION ONLY (OUTPATIENT)
Dept: TRANSPLANT | Facility: CLINIC | Age: 57
End: 2022-04-13
Payer: MEDICARE

## 2022-04-17 ENCOUNTER — PATIENT MESSAGE (OUTPATIENT)
Dept: TRANSPLANT | Facility: CLINIC | Age: 57
End: 2022-04-17
Payer: MEDICARE

## 2022-04-25 ENCOUNTER — PATIENT MESSAGE (OUTPATIENT)
Dept: TRANSPLANT | Facility: CLINIC | Age: 57
End: 2022-04-25
Payer: MEDICARE

## 2022-04-26 ENCOUNTER — DOCUMENTATION ONLY (OUTPATIENT)
Dept: TRANSPLANT | Facility: CLINIC | Age: 57
End: 2022-04-26
Payer: MEDICARE

## 2022-04-26 ENCOUNTER — PATIENT MESSAGE (OUTPATIENT)
Dept: TRANSPLANT | Facility: CLINIC | Age: 57
End: 2022-04-26
Payer: MEDICARE

## 2022-04-26 DIAGNOSIS — Z94.0 S/P KIDNEY TRANSPLANT: ICD-10-CM

## 2022-04-26 LAB — EXT TACROLIMUS LVL: 8.9

## 2022-04-26 RX ORDER — TACROLIMUS 1 MG/1
CAPSULE ORAL
Qty: 990 CAPSULE | Refills: 3 | Status: SHIPPED | OUTPATIENT
Start: 2022-04-26 | End: 2022-06-08

## 2022-04-26 NOTE — TELEPHONE ENCOUNTER
Jey msg sent ot pt regarding below orders.----- Message from Guerline Goel MD sent at 4/26/2022  1:24 PM CDT -----  Lower tacro to 6 mg QAM and 5 mg nightly. Confirm he stopped Valcyte.

## 2022-05-10 ENCOUNTER — DOCUMENTATION ONLY (OUTPATIENT)
Dept: TRANSPLANT | Facility: CLINIC | Age: 57
End: 2022-05-10
Payer: MEDICARE

## 2022-05-10 ENCOUNTER — PATIENT MESSAGE (OUTPATIENT)
Dept: TRANSPLANT | Facility: CLINIC | Age: 57
End: 2022-05-10
Payer: MEDICARE

## 2022-05-10 LAB
EXT ALBUMIN: 4 (ref 3.7–5.3)
EXT BUN: 25 (ref 6–20)
EXT CALCIUM: 8.6 (ref 8.5–10.5)
EXT CHLORIDE: 111 (ref 101–112)
EXT CO2: 20 (ref 20–32)
EXT CREATININE: 1.38 MG/DL
EXT EGFR NO RACE VARIABLE: 59
EXT EOSINOPHIL%: 2.3
EXT GLUCOSE: 101 (ref 74–106)
EXT HEMATOCRIT: 40 (ref 43.5–53.7)
EXT HEMOGLOBIN: 12.1 (ref 14.1–18.1)
EXT LYMPH%: 29.1
EXT MAGNESIUM: 1.7 (ref 1.3–2.1)
EXT MONOCYTES%: 17.7
EXT PHOSPHORUS: 2.9 (ref 2.7–4.5)
EXT PLATELETS: 255 (ref 142–424)
EXT POTASSIUM: 4 (ref 3.4–5.1)
EXT SEGS%: 50.4
EXT SODIUM: 140 MMOL/L (ref 135–145)
EXT TACROLIMUS LVL: 7.7
EXT WBC: 7.5 (ref 4.6–10.2)

## 2022-05-11 ENCOUNTER — PATIENT MESSAGE (OUTPATIENT)
Dept: RESEARCH | Facility: CLINIC | Age: 57
End: 2022-05-11
Payer: MEDICARE

## 2022-05-22 ENCOUNTER — PATIENT MESSAGE (OUTPATIENT)
Dept: TRANSPLANT | Facility: CLINIC | Age: 57
End: 2022-05-22
Payer: MEDICARE

## 2022-06-05 ENCOUNTER — PATIENT MESSAGE (OUTPATIENT)
Dept: TRANSPLANT | Facility: CLINIC | Age: 57
End: 2022-06-05
Payer: MEDICARE

## 2022-06-06 ENCOUNTER — DOCUMENTATION ONLY (OUTPATIENT)
Dept: TRANSPLANT | Facility: CLINIC | Age: 57
End: 2022-06-06
Payer: MEDICARE

## 2022-06-06 ENCOUNTER — TELEPHONE (OUTPATIENT)
Dept: TRANSPLANT | Facility: CLINIC | Age: 57
End: 2022-06-06
Payer: MEDICARE

## 2022-06-06 LAB
CASTS, UA: ABNORMAL
EXT ALBUMIN: 3.9 (ref 3.7–5.3)
EXT ALKALINE PHOSPHATASE: 293 (ref 34–154)
EXT ALT: 185 (ref 7–55)
EXT AST: 94 (ref 8–35)
EXT BILIRUBIN DIRECT: 0.9 MG/DL (ref 0–0.4)
EXT BILIRUBIN TOTAL: 1.8 (ref 0.3–1.2)
EXT BK VIRUS DNA QN PCR: ABNORMAL
EXT BUN: 20 (ref 6–20)
EXT CALCIUM: 9 (ref 8.5–10.5)
EXT CHLORIDE: 108 (ref 101–112)
EXT CO2: 24 (ref 20–32)
EXT CREATININE UA: 24.6
EXT CREATININE: 1.3 MG/DL
EXT EGFR NO RACE VARIABLE: >60
EXT EOSINOPHIL%: 4.9
EXT GLUCOSE: 125 (ref 74–106)
EXT HEMATOCRIT: 38.3 (ref 43.5–53.7)
EXT HEMOGLOBIN: 11.5 (ref 14.1–18.1)
EXT LYMPH%: 35.6
EXT MAGNESIUM: 1.7 (ref 1.3–2.1)
EXT MONOCYTES%: 12.9
EXT NITRITES UA: ABNORMAL
EXT PHOSPHORUS: 3.3 (ref 2.7–4.5)
EXT PLATELETS: 278 (ref 142–424)
EXT POTASSIUM: 4 (ref 3.4–5.1)
EXT PROT/CREAT RATIO UR: 0.14
EXT PROTEIN TOTAL: 6.6 (ref 6.4–8.3)
EXT SEGS%: 46
EXT SODIUM: 140 MMOL/L (ref 135–145)
EXT TACROLIMUS LVL: 11.1
EXT UR LEUK ESTERASE: ABNORMAL
EXT URINE APPEARANCE: CLEAR
EXT URINE BACTERIA: ABNORMAL
EXT URINE BILLIRUBIN: ABNORMAL
EXT URINE COLOR: YELLOW
EXT URINE GLUCOSE: ABNORMAL
EXT URINE KETONES: ABNORMAL
EXT URINE OCCULT BLOOD: ABNORMAL
EXT URINE PH: 6
EXT URINE PROTEIN: 3.5
EXT URINE PROTEIN: ABNORMAL
EXT URINE SP GRAVITY: 1.01
EXT URINE UROBILLINOGEN: 0.2
EXT WBC UA: ABNORMAL
EXT WBC: 6.6 (ref 4.6–10.2)

## 2022-06-06 NOTE — TELEPHONE ENCOUNTER
----- Message from Guerline Goel MD sent at 6/6/2022  1:56 PM CDT -----  Agree- stop dapsone    ----- Message -----  From: Patricia Clifton PharmD  Sent: 6/6/2022   1:55 PM CDT  To: Guerline Goel MD, #    Dapsone can potentially cause cholestasis - and while I think it is unlikely to be the culprit, he is due to stop this month anyway, so let's have him stop his dapsone early.  Otherwise, I really don't see anything that can be causing the elevated LFTs.   ----- Message -----  From: Guerline Goel MD  Sent: 6/6/2022   1:34 PM CDT  To: [facility], #    Hepatology consult for abnormal LFTs.  Will ask pharmD to review meds as well.

## 2022-06-07 ENCOUNTER — TELEPHONE (OUTPATIENT)
Dept: TRANSPLANT | Facility: CLINIC | Age: 57
End: 2022-06-07
Payer: MEDICARE

## 2022-06-07 NOTE — TELEPHONE ENCOUNTER
Spoke with patient regarding below. Patient reports starting the Colestid about 3 weeks ago for diarrhea. GI doctor prescribed d/t pancreatic insufficiency. Patient reports still having diarrhea daily x3 , after eating.  Will stop both Dapsone and Colestid.  Patient will see hepatology at [facility].  ----- Message from Patricia Clifton PharmD sent at 6/7/2022  9:12 AM CDT -----  I did not realize that he had already started both of these.     I am not concerned with the Creon.     He is taking colestipol (Colestid), correct?  Or is this some other supplement product called colistol?    For Colestipol - it is a bile acid sequestrant and is therefore not absorbed and also not associated with acute liver injury.  An increase in LFTs have been reported, however the increase is normally more mild (1-3 fold increase), which I think may be confounded in that it is also used for itching for patients with elevated bilirubin due to liver disease.  What was he started on this for- cholesterol? It looks like it was ordered by his GI doctor.     I think it is reasonable to hold the colestipol temporarily if he is using it for cholesterol.     ----- Message -----  From: Hang Campbell RN  Sent: 6/6/2022   4:53 PM CDT  To: Guerline Goel MD, #    Patient has been taking Creon and Colistol. Could this cause elevated labs?  ----- Message -----  From: Guerline Goel MD  Sent: 6/6/2022   1:56 PM CDT  To: Patricia Clifton PharmD, #    Agree- stop dapsone    ----- Message -----  From: Patricia Clifton PharmD  Sent: 6/6/2022   1:55 PM CDT  To: Guerline Goel MD, #    Dapsone can potentially cause cholestasis - and while I think it is unlikely to be the culprit, he is due to stop this month anyway, so let's have him stop his dapsone early.  Otherwise, I really don't see anything that can be causing the elevated LFTs.   ----- Message -----  From: Guerline Goel MD  Sent: 6/6/2022    1:34 PM CDT  To: [facility], #    Hepatology consult for abnormal LFTs.  Will ask pharmD to review meds as well.

## 2022-06-07 NOTE — TELEPHONE ENCOUNTER
Dapsone DC'd. Patient aware . Mychart msg sent. Will put in Hepatology consult.----- Message from Patricia Clifton PharmD sent at 6/6/2022  1:51 PM CDT -----  Dapsone can potentially cause cholestasis - and while I think it is unlikely to be the culprit, he is due to stop this month anyway, so let's have him stop his dapsone early.  Otherwise, I really don't see anything that can be causing the elevated LFTs.   ----- Message -----  From: Guerline Goel MD  Sent: 6/6/2022   1:34 PM CDT  To: [facility], #    Hepatology consult for abnormal LFTs.  Will ask pharmD to review meds as well.

## 2022-06-08 ENCOUNTER — PATIENT MESSAGE (OUTPATIENT)
Dept: TRANSPLANT | Facility: CLINIC | Age: 57
End: 2022-06-08
Payer: MEDICARE

## 2022-06-08 DIAGNOSIS — Z94.0 S/P KIDNEY TRANSPLANT: Primary | ICD-10-CM

## 2022-06-08 DIAGNOSIS — Z94.0 S/P KIDNEY TRANSPLANT: ICD-10-CM

## 2022-06-08 DIAGNOSIS — R79.89 ELEVATED LFTS: ICD-10-CM

## 2022-06-08 RX ORDER — TACROLIMUS 1 MG/1
CAPSULE ORAL
Qty: 900 CAPSULE | Refills: 3 | Status: SHIPPED | OUTPATIENT
Start: 2022-06-08 | End: 2022-09-15

## 2022-06-08 NOTE — TELEPHONE ENCOUNTER
MSg sent to patient regarding below.----- Message from Guerline Goel MD sent at 6/8/2022  4:11 PM CDT -----  Lower tacro from 6/5 to 5 mg BID, if this is an accurate 12 hr trough.  He is fluctuating a bit, so please ask him to pay close attention to timing.

## 2022-06-09 ENCOUNTER — DOCUMENTATION ONLY (OUTPATIENT)
Dept: TRANSPLANT | Facility: CLINIC | Age: 57
End: 2022-06-09
Payer: MEDICARE

## 2022-06-09 ENCOUNTER — PATIENT MESSAGE (OUTPATIENT)
Dept: TRANSPLANT | Facility: CLINIC | Age: 57
End: 2022-06-09
Payer: MEDICARE

## 2022-06-09 LAB — EXT BK VIRUS DNA QN PCR: NEGATIVE

## 2022-06-22 ENCOUNTER — OFFICE VISIT (OUTPATIENT)
Dept: HEPATOLOGY | Facility: CLINIC | Age: 57
End: 2022-06-22
Payer: MEDICARE

## 2022-06-22 ENCOUNTER — LAB VISIT (OUTPATIENT)
Dept: LAB | Facility: HOSPITAL | Age: 57
End: 2022-06-22
Payer: MEDICARE

## 2022-06-22 ENCOUNTER — OFFICE VISIT (OUTPATIENT)
Dept: TRANSPLANT | Facility: CLINIC | Age: 57
End: 2022-06-22
Payer: MEDICARE

## 2022-06-22 VITALS
HEIGHT: 73 IN | WEIGHT: 197.06 LBS | OXYGEN SATURATION: 98 % | HEART RATE: 69 BPM | RESPIRATION RATE: 18 BRPM | TEMPERATURE: 97 F | SYSTOLIC BLOOD PRESSURE: 163 MMHG | DIASTOLIC BLOOD PRESSURE: 77 MMHG | BODY MASS INDEX: 26.12 KG/M2

## 2022-06-22 VITALS
OXYGEN SATURATION: 98 % | SYSTOLIC BLOOD PRESSURE: 144 MMHG | DIASTOLIC BLOOD PRESSURE: 70 MMHG | TEMPERATURE: 97 F | HEIGHT: 72 IN | HEART RATE: 67 BPM | BODY MASS INDEX: 26.73 KG/M2 | RESPIRATION RATE: 17 BRPM | WEIGHT: 197.31 LBS

## 2022-06-22 DIAGNOSIS — I15.1 HYPERTENSION SECONDARY TO OTHER RENAL DISORDERS: ICD-10-CM

## 2022-06-22 DIAGNOSIS — Z79.899 IMMUNOSUPPRESSIVE MANAGEMENT ENCOUNTER FOLLOWING KIDNEY TRANSPLANT: ICD-10-CM

## 2022-06-22 DIAGNOSIS — Z94.0 S/P KIDNEY TRANSPLANT: ICD-10-CM

## 2022-06-22 DIAGNOSIS — E78.2 MIXED HYPERLIPIDEMIA: ICD-10-CM

## 2022-06-22 DIAGNOSIS — Z91.89 AT RISK FOR OPPORTUNISTIC INFECTIONS: ICD-10-CM

## 2022-06-22 DIAGNOSIS — Z79.4 TYPE 2 DIABETES MELLITUS WITH CHRONIC KIDNEY DISEASE, WITH LONG-TERM CURRENT USE OF INSULIN, UNSPECIFIED CKD STAGE: ICD-10-CM

## 2022-06-22 DIAGNOSIS — R19.7 DIARRHEA, UNSPECIFIED TYPE: ICD-10-CM

## 2022-06-22 DIAGNOSIS — R79.89 ELEVATED LFTS: Primary | ICD-10-CM

## 2022-06-22 DIAGNOSIS — Z79.60 LONG-TERM USE OF IMMUNOSUPPRESSANT MEDICATION: ICD-10-CM

## 2022-06-22 DIAGNOSIS — E11.22 TYPE 2 DIABETES MELLITUS WITH CHRONIC KIDNEY DISEASE, WITH LONG-TERM CURRENT USE OF INSULIN, UNSPECIFIED CKD STAGE: ICD-10-CM

## 2022-06-22 DIAGNOSIS — K52.9 CHRONIC DIARRHEA: ICD-10-CM

## 2022-06-22 DIAGNOSIS — R79.89 ELEVATED LFTS: ICD-10-CM

## 2022-06-22 DIAGNOSIS — Z94.0 DECEASED-DONOR KIDNEY TRANSPLANT: ICD-10-CM

## 2022-06-22 DIAGNOSIS — E83.39 HYPOPHOSPHATEMIA: ICD-10-CM

## 2022-06-22 DIAGNOSIS — Z94.0 IMMUNOSUPPRESSIVE MANAGEMENT ENCOUNTER FOLLOWING KIDNEY TRANSPLANT: ICD-10-CM

## 2022-06-22 DIAGNOSIS — N18.2 CHRONIC KIDNEY DISEASE (CKD), STAGE II (MILD): Primary | ICD-10-CM

## 2022-06-22 LAB
A1AT SERPL-MCNC: 140 MG/DL (ref 100–190)
ALBUMIN SERPL BCP-MCNC: 4.1 G/DL (ref 3.5–5.2)
ALP SERPL-CCNC: 146 U/L (ref 55–135)
ALT SERPL W/O P-5'-P-CCNC: 25 U/L (ref 10–44)
ANION GAP SERPL CALC-SCNC: 13 MMOL/L (ref 8–16)
AST SERPL-CCNC: 20 U/L (ref 10–40)
BASOPHILS # BLD AUTO: 0.04 K/UL (ref 0–0.2)
BASOPHILS NFR BLD: 0.7 % (ref 0–1.9)
BILIRUB SERPL-MCNC: 0.7 MG/DL (ref 0.1–1)
BUN SERPL-MCNC: 17 MG/DL (ref 6–20)
CALCIUM SERPL-MCNC: 9.5 MG/DL (ref 8.7–10.5)
CERULOPLASMIN SERPL-MCNC: 25 MG/DL (ref 15–45)
CHLORIDE SERPL-SCNC: 109 MMOL/L (ref 95–110)
CK SERPL-CCNC: 20 U/L (ref 20–200)
CO2 SERPL-SCNC: 21 MMOL/L (ref 23–29)
CREAT SERPL-MCNC: 1.3 MG/DL (ref 0.5–1.4)
DIFFERENTIAL METHOD: ABNORMAL
EOSINOPHIL # BLD AUTO: 0.1 K/UL (ref 0–0.5)
EOSINOPHIL NFR BLD: 1.8 % (ref 0–8)
ERYTHROCYTE [DISTWIDTH] IN BLOOD BY AUTOMATED COUNT: 14.6 % (ref 11.5–14.5)
EST. GFR  (AFRICAN AMERICAN): >60 ML/MIN/1.73 M^2
EST. GFR  (NON AFRICAN AMERICAN): >60 ML/MIN/1.73 M^2
FERRITIN SERPL-MCNC: 386 NG/ML (ref 20–300)
GLUCOSE SERPL-MCNC: 175 MG/DL (ref 70–110)
HCT VFR BLD AUTO: 44.3 % (ref 40–54)
HGB BLD-MCNC: 13.5 G/DL (ref 14–18)
IGG SERPL-MCNC: 936 MG/DL (ref 650–1600)
IMM GRANULOCYTES # BLD AUTO: 0.02 K/UL (ref 0–0.04)
IMM GRANULOCYTES NFR BLD AUTO: 0.3 % (ref 0–0.5)
INR PPP: 1.1 (ref 0.8–1.2)
IRON SERPL-MCNC: 33 UG/DL (ref 45–160)
LYMPHOCYTES # BLD AUTO: 1.5 K/UL (ref 1–4.8)
LYMPHOCYTES NFR BLD: 24.3 % (ref 18–48)
MCH RBC QN AUTO: 31 PG (ref 27–31)
MCHC RBC AUTO-ENTMCNC: 30.5 G/DL (ref 32–36)
MCV RBC AUTO: 102 FL (ref 82–98)
MONOCYTES # BLD AUTO: 0.6 K/UL (ref 0.3–1)
MONOCYTES NFR BLD: 9.5 % (ref 4–15)
NEUTROPHILS # BLD AUTO: 3.9 K/UL (ref 1.8–7.7)
NEUTROPHILS NFR BLD: 63.4 % (ref 38–73)
NRBC BLD-RTO: 0 /100 WBC
PLATELET # BLD AUTO: 220 K/UL (ref 150–450)
PMV BLD AUTO: 11.6 FL (ref 9.2–12.9)
POTASSIUM SERPL-SCNC: 4.6 MMOL/L (ref 3.5–5.1)
PROT SERPL-MCNC: 7 G/DL (ref 6–8.4)
PROTHROMBIN TIME: 10.9 SEC (ref 9–12.5)
RBC # BLD AUTO: 4.36 M/UL (ref 4.6–6.2)
SATURATED IRON: 11 % (ref 20–50)
SODIUM SERPL-SCNC: 143 MMOL/L (ref 136–145)
TOTAL IRON BINDING CAPACITY: 297 UG/DL (ref 250–450)
TRANSFERRIN SERPL-MCNC: 201 MG/DL (ref 200–375)
WBC # BLD AUTO: 6.12 K/UL (ref 3.9–12.7)

## 2022-06-22 PROCEDURE — 99214 PR OFFICE/OUTPT VISIT, EST, LEVL IV, 30-39 MIN: ICD-10-PCS | Mod: S$PBB,,, | Performed by: NURSE PRACTITIONER

## 2022-06-22 PROCEDURE — 86709 HEPATITIS A IGM ANTIBODY: CPT | Performed by: NURSE PRACTITIONER

## 2022-06-22 PROCEDURE — 86235 NUCLEAR ANTIGEN ANTIBODY: CPT | Mod: 91 | Performed by: NURSE PRACTITIONER

## 2022-06-22 PROCEDURE — 99999 PR PBB SHADOW E&M-EST. PATIENT-LVL V: ICD-10-PCS | Mod: PBBFAC,,, | Performed by: NURSE PRACTITIONER

## 2022-06-22 PROCEDURE — 82550 ASSAY OF CK (CPK): CPT | Performed by: NURSE PRACTITIONER

## 2022-06-22 PROCEDURE — 86038 ANTINUCLEAR ANTIBODIES: CPT | Performed by: NURSE PRACTITIONER

## 2022-06-22 PROCEDURE — 99999 PR PBB SHADOW E&M-EST. PATIENT-LVL V: CPT | Mod: PBBFAC,,, | Performed by: NURSE PRACTITIONER

## 2022-06-22 PROCEDURE — 99999 PR PBB SHADOW E&M-EST. PATIENT-LVL IV: ICD-10-PCS | Mod: PBBFAC,,, | Performed by: INTERNAL MEDICINE

## 2022-06-22 PROCEDURE — 99214 OFFICE O/P EST MOD 30 MIN: CPT | Mod: S$PBB,,, | Performed by: NURSE PRACTITIONER

## 2022-06-22 PROCEDURE — 82390 ASSAY OF CERULOPLASMIN: CPT | Performed by: NURSE PRACTITIONER

## 2022-06-22 PROCEDURE — 36415 COLL VENOUS BLD VENIPUNCTURE: CPT | Performed by: NURSE PRACTITIONER

## 2022-06-22 PROCEDURE — 99215 PR OFFICE/OUTPT VISIT, EST, LEVL V, 40-54 MIN: ICD-10-PCS | Mod: S$PBB,,, | Performed by: INTERNAL MEDICINE

## 2022-06-22 PROCEDURE — 80321 ALCOHOLS BIOMARKERS 1OR 2: CPT | Performed by: NURSE PRACTITIONER

## 2022-06-22 PROCEDURE — 86790 VIRUS ANTIBODY NOS: CPT | Performed by: NURSE PRACTITIONER

## 2022-06-22 PROCEDURE — 84466 ASSAY OF TRANSFERRIN: CPT | Performed by: NURSE PRACTITIONER

## 2022-06-22 PROCEDURE — 80053 COMPREHEN METABOLIC PANEL: CPT | Performed by: NURSE PRACTITIONER

## 2022-06-22 PROCEDURE — 99999 PR PBB SHADOW E&M-EST. PATIENT-LVL IV: CPT | Mod: PBBFAC,,, | Performed by: INTERNAL MEDICINE

## 2022-06-22 PROCEDURE — 99215 OFFICE O/P EST HI 40 MIN: CPT | Mod: S$PBB,,, | Performed by: INTERNAL MEDICINE

## 2022-06-22 PROCEDURE — 99214 OFFICE O/P EST MOD 30 MIN: CPT | Mod: PBBFAC | Performed by: INTERNAL MEDICINE

## 2022-06-22 PROCEDURE — 85610 PROTHROMBIN TIME: CPT | Performed by: NURSE PRACTITIONER

## 2022-06-22 PROCEDURE — 86256 FLUORESCENT ANTIBODY TITER: CPT | Performed by: NURSE PRACTITIONER

## 2022-06-22 PROCEDURE — 82784 ASSAY IGA/IGD/IGG/IGM EACH: CPT | Mod: 59 | Performed by: NURSE PRACTITIONER

## 2022-06-22 PROCEDURE — 99215 OFFICE O/P EST HI 40 MIN: CPT | Mod: PBBFAC,27 | Performed by: NURSE PRACTITIONER

## 2022-06-22 PROCEDURE — 83516 IMMUNOASSAY NONANTIBODY: CPT | Performed by: NURSE PRACTITIONER

## 2022-06-22 PROCEDURE — 85025 COMPLETE CBC W/AUTO DIFF WBC: CPT | Performed by: NURSE PRACTITIONER

## 2022-06-22 PROCEDURE — 82728 ASSAY OF FERRITIN: CPT | Performed by: NURSE PRACTITIONER

## 2022-06-22 RX ORDER — PANCRELIPASE 36000; 180000; 114000 [USP'U]/1; [USP'U]/1; [USP'U]/1
CAPSULE, DELAYED RELEASE PELLETS ORAL
COMMUNITY
Start: 2022-06-02

## 2022-06-22 NOTE — PATIENT INSTRUCTIONS
- Ultrasound of the liver with doppler studies at [facility]. Call [phone] to schedule the appointment.  - Repeat liver function tests now.   - Will also check titer levels for Hepatitis A with blood draw.  - Avoid alcohol and herbal supplekents.  - Return to clinic in 3 weeks to review results and plan of care.

## 2022-06-22 NOTE — PROGRESS NOTES
[facility] New Patient Visit    Reason for Visit: Elevated liver enzymes    PCP: Héctor Calvillo    HPI:  This is a 57 y.o. male with PMH noted below, here for evaluation of elevated liver enzymes.    The patient's risk factors for NAFLD/COOK include:     · Obesity                                        No; BMI 26  · Dyslipidemia                                Yes; Mild hypertriglyceridemia ( in 3/2022)  · Insulin resistance/Diabetes         Yes; Last HgbA1c was 5.9% (12/2021)    He is S/P kidney transplant in December, and has had elevated liver enzymes in a mixed pattern since 1/2022. He is on Tacrolimus, Mycophenolate and Prednisone. He was previously on Dapsone as well (discontinued earlier this month). Most recent LFT's, drawn in early June at [facility] showed an ALP of 273, ALT of 161, AST of 65. T. Bili was also mildly elevated at 1.5, Albumin was 3.9. He has not undergone any recent abdominal imaging. He is followed by GI for chronic diarrhea (developed after cholecystectomy and has worsened as of late). He tried Colestid, but it did not help much with his symptoms, and interacted with his immunosuppressive medications. He is now on Creon per Dr. Shah at [facility].   He has no known family history of liver disease. He does not drink alcohol heavily or use illicit drugs. He is immune to Hepatitis B, through prior vaccination. HCV and HIV negative on prior labs in 12/2021. He is well appearing, and denies any signs or symptoms of decompensated liver disease including jaundice, dark urine, pruritus, abdominal distention, lower extremity edema, hematemesis, melena, or periods of confusion suggestive of hepatic encephalopathy.      PMHX:  has a past medical history of Chronic pulmonary embolism (6/1/2021), Diabetes mellitus, Diabetic nephropathy associated with type 2 diabetes mellitus (6/1/2021), Disorder of kidney and ureter, ESRD (end stage renal disease) (6/1/2021),  Essential hypertension (6/1/2021), GERD (gastroesophageal reflux disease), Mixed hyperlipidemia (6/1/2021), and Sleep apnea (6/1/2021).    PSHX:  has a past surgical history that includes Gastric bypass; Cholecystectomy; Colonoscopy; Hernia repair; Cataract extraction; Cardiac catheterization; Removal of catheter; Colonoscopy; Dialysis fistula creation; Gastric bypass; Hardware Removal; Knee surgery; Paracentesis; Peritoneal catheter insertion; Kidney transplant (N/A, 12/25/2021); Peritoneal catheter removal (N/A, 12/25/2021); and Cystoscopy.    The patient's social and family histories were reviewed by me and updated in the appropriate section of the electronic medical record.    Review of patient's allergies indicates:   Allergen Reactions    Simvastatin Other (See Comments)     cramping     Current Outpatient Medications on File Prior to Visit   Medication Sig Dispense Refill    blood sugar diagnostic (ACCU-CHEK GUIDE TEST STRIPS MISC) 1 each by abdominal subcutaneous route 3 (three) times daily.      blood sugar diagnostic Strp 1 each by Misc.(Non-Drug; Combo Route) route 3 (three) times daily. 100 each 11    blood-glucose meter kit Use as instructed 1 each 0    cholecalciferol, vitamin D3, (VITAMIN D3) 50 mcg (2,000 unit) Tab Take 2,000 Units by mouth once daily.      CREON 36,000-114,000- 180,000 unit CpDR Take by mouth. As directed      cyanocobalamin (VITAMIN B-12) 1000 MCG tablet Take 1,000 mcg by mouth once daily.      docusate sodium (COLACE) 100 MG capsule Take 1 capsule (100 mg total) by mouth 3 (three) times daily as needed for Constipation. 30 capsule 0    insulin detemir U-100 (LEVEMIR FLEXTOUCH) 100 unit/mL (3 mL) SubQ InPn pen Inject 7 Units into the skin once daily. 15 mL 11    insulin lispro (HUMALOG KWIKPEN INSULIN) 100 unit/mL pen Inject 6 Units into the skin 3 (three) times daily with meals. Plus SSI: 180 - 230 + 2 unit; 231- 280  + 4 units; 281 - 330 + 6 units; 331 - 380 + 8 units;  "> 380   + 10 units. Max TDD 48 units. (Patient taking differently: Inject 12 Units into the skin 3 (three) times daily with meals. Plus SSI: 180 - 230 + 2 unit; 231- 280  + 4 units; 281 - 330 + 6 units; 331 - 380 + 8 units; > 380   + 10 units. Max TDD 48 units.) 15 mL 11    lancets Misc 1 each by Misc.(Non-Drug; Combo Route) route 3 (three) times daily. 100 each 11    multivitamin Tab Take 1 tablet by mouth once daily.      mycophenolate (CELLCEPT) 250 mg Cap Take 4 capsules (1,000 mg total) by mouth 2 (two) times daily. 240 capsule 11    NIFEdipine (PROCARDIA-XL) 30 MG (OSM) 24 hr tablet Take 1 tablet (30 mg total) by mouth 2 (two) times a day. 60 tablet 11    pantoprazole (PROTONIX) 40 MG tablet Take 1 tablet (40 mg total) by mouth once daily. 30 tablet 11    pen needle, diabetic (EASY COMFORT PEN NEEDLES) 32 gauge x 5/32" Ndle Inject 1 each into the skin 3 (three) times daily. 100 each 11    predniSONE (DELTASONE) 5 MG tablet Take 20 mg by mouth once daily from 12/28-1/27/22;  Take 15mg daily from 1/28-2/27;   Take 10mg daily from 2/28-3/27; Take 5 mg daily thereafter beginning 3/28/22 (Patient taking differently: Take 20 mg by mouth once daily from 12/28-1/27/22;  Take 15mg daily from 1/28-2/27;   Take 10mg daily from 2/28-3/27; Take 5 mg daily thereafter beginning 3/28/22) 120 tablet 11    rivaroxaban (XARELTO DVT-PE TREAT 30D START) 15 mg (42)- 20 mg (9) tablet dose pack Take 1 tablet (15 mg) by mouth twice daily with food for 21 days followed by 1 tablet (20 mg) by mouth once daily with food      tacrolimus (PROGRAF) 1 MG Cap Take 5 capsules (5 mg total) by mouth every morning AND 5 capsules (5 mg total) every evening. 900 capsule 3    traMADoL (ULTRAM) 50 mg tablet Take 1 tablet (50 mg total) by mouth every 6 (six) hours as needed for Pain. 28 tablet 0    [DISCONTINUED] insulin aspart U-100 (NOVOLOG) 100 unit/mL injection Inject 1-2 Units into the skin as needed for High Blood Sugar.      " "[DISCONTINUED] k phos di & mono-sod phos mono (K-PHOS-NEUTRAL) 250 mg Tab Take 2 tablets by mouth 2 (two) times a day. 120 tablet 11    [DISCONTINUED] valGANciclovir (VALCYTE) 450 mg Tab Take 2 tablets (900 mg total) by mouth once daily. STOP 3/25/22 (Patient not taking: Reported on 6/22/2022) 60 tablet 2     No current facility-administered medications on file prior to visit.     SOCIAL HISTORY:   Social History     Tobacco Use   Smoking Status Never Smoker   Smokeless Tobacco Never Used     Social History     Substance and Sexual Activity   Alcohol Use Never     Social History     Substance and Sexual Activity   Drug Use Never     ROS:   GENERAL: Denies fever, chills, weight loss/gain, fatigue  HEENT: Denies headaches, dizziness, vision/hearing changes  CARDIOVASCULAR: Denies chest pain, palpitations, or edema  RESPIRATORY: Denies dyspnea, cough  GI: Denies abdominal pain, rectal bleeding, nausea, vomiting. No change in bowel pattern or color  : Denies dysuria, hematuria   SKIN: Denies rash, itching   NEURO: Denies confusion, memory loss, or mood changes  PSYCH: Denies depression or anxiety  HEME/LYMPH: Denies easy bruising or bleeding    Objective Findings:    PHYSICAL EXAM:   Friendly White male, in no acute distress; alert and oriented to person, place and time.  VITALS: BP (!) 163/77 (BP Location: Right arm, Patient Position: Sitting, BP Method: Medium (Automatic))   Pulse 69   Temp 97 °F (36.1 °C) (Oral)   Resp 18   Ht 6' 1" (1.854 m)   Wt 89.4 kg (197 lb 1.5 oz)   SpO2 98%   BMI 26.00 kg/m²   HENT: Normocephalic, without obvious abnormality.  EYES: Sclerae anicteric.  NECK: No obvious masses.  CARDIOVASCULAR: Regular rate. No peripheral edema.  RESPIRATORY: Normal respiratory effort.  GI: Soft, non-tender, non-distended.   EXTREMITIES:  No clubbing, cyanosis or edema.  SKIN: Warm and dry. No jaundice. No rashes noted to exposed skin.   NEURO:  Normal gait. No asterixis.  PSYCH:  Memory intact. " Thought and speech pattern appropriate. Behavior normal. No depression or anxiety noted.    DIAGNOSTIC STUDIES:    ABD. U/S - Ordered at visit.       EDUCATION:  Per AVS.    ASSESSMENT & PLAN:  57 y.o. White male with:    1. Elevated LFTs  Ambulatory referral/consult to Hepatology    US Abdomen Complete with Doppler (xpd)    US Abdomen Complete with Doppler (xpd)    Comprehensive Metabolic Panel    CBC Auto Differential    Anti-Smooth Muscle Antibody    Antimitochondrial Antibody    DEANNA Screen w/Reflex    Alpha-1-Antitrypsin    Phosphatidylethanol (PETH)    Ferritin    IgG    Iron and TIBC    Ceruloplasmin    CK    Celiac Disease Panel    Protime-INR    Hepatitis A antibody, IgG    Hepatitis A Antibody, IgM    CANCELED: US Abdomen Complete with Doppler (xpd)   2. S/P kidney transplant  Ambulatory referral/consult to Hepatology    US Abdomen Complete with Doppler (xpd)    US Abdomen Complete with Doppler (xpd)    Comprehensive Metabolic Panel    CBC Auto Differential    Anti-Smooth Muscle Antibody    Antimitochondrial Antibody    DEANNA Screen w/Reflex    Alpha-1-Antitrypsin    Phosphatidylethanol (PETH)    Ferritin    IgG    Iron and TIBC    Ceruloplasmin    CK    Celiac Disease Panel    Protime-INR    Hepatitis A antibody, IgG    Hepatitis A Antibody, IgM    CANCELED: US Abdomen Complete with Doppler (xpd)   3. Long-term use of immunosuppressant medication  Comprehensive Metabolic Panel    CBC Auto Differential    Anti-Smooth Muscle Antibody    Antimitochondrial Antibody    DEANNA Screen w/Reflex    Alpha-1-Antitrypsin    Phosphatidylethanol (PETH)    Ferritin    IgG    Iron and TIBC    Ceruloplasmin    CK    Celiac Disease Panel    Protime-INR    Hepatitis A antibody, IgG    Hepatitis A Antibody, IgM   4. Chronic diarrhea     5. Type 2 diabetes mellitus with chronic kidney disease, with long-term current use of insulin, unspecified CKD stage     6. Mixed hyperlipidemia       - Schedule Ultrasound of the liver with  doppler [facility].   - Repeat liver function tests now.   - Will also check titer levels for Hepatitis A with blood draw.  - Avoid alcohol and herbal supplekents.  - Return to clinic in 3 weeks to review results and plan of care.    Follow up in about 3 weeks (around 7/13/2022). with labs and ultrasound before to visit.    Thank you for allowing me to participate in the care of Antwon Valdez       Hepatology Nurse Practitioner  [facility]  6/22/2022 @ 1640    CC'ed note to:   Guerline Goel*  Héctor Calvillo MD

## 2022-06-22 NOTE — PROGRESS NOTES
Kidney Post-Transplant Assessment    Referring Physician: Héctor Calvillo  Current Nephrologist: Héctor Calvillo    ORGAN: RIGHT KIDNEY  Donor Type: donation after brain death  PHS Increased Risk: no  Cold Ischemia: 1,221 mins  Induction Medications: simulect - basiliximab    Subjective:     CC:  Reassessment of renal allograft function and management of chronic immunosuppression.    HPI:  Mr. Valdez is a 57 y.o. 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  He has CKD stage 2 - GFR 60-89 and his baseline creatinine is between 1.1-1.3. He takes everolimus, prednisone and tacrolimus for maintenance immunosuppression.     rvwd 10/27/21 0MM offer  gastric bypass c/b severe reflux, treated with esoph dilation  DM 1995  PE, incidentally found large PE 12/2020 treated x 3 mos [4 mos after bariatric and foot surgery, found during w/u for SOB]  4/2022 LE DVT --> indefinite xalrelto  Sleep apnea  Hypotension on midodrine since gastric bypass  DKT 12/25/21; CIT 20+h; KDPI 65%;  1/28/22 high ALT- bactrim d/c'd    POST TRANSPLANT UPDATE 06/22/2022   Antwon states he is doing well overall, although he has been having GI issues for which he sees local GI doc. Creon has not helped, and a repeat scope has been mentioned. He believes it started abt 2 mos ago.  He denies CP, SOB. He reports no N/V, and he feels he is tolerating meds well.  HTN: home reads are usually 120/70s  He is seeing hepatology today.    Current Outpatient Medications   Medication Sig    blood sugar diagnostic Strp 1 each by Misc.(Non-Drug; Combo Route) route 3 (three) times daily.    blood-glucose meter kit Use as instructed    cholecalciferol, vitamin D3, (VITAMIN D3) 50 mcg (2,000 unit) Tab Take 2,000 Units by mouth once daily.    CREON 36,000-114,000- 180,000 unit CpDR Take by mouth. As directed    cyanocobalamin (VITAMIN B-12) 1000 MCG tablet Take 1,000 mcg by mouth once daily.    docusate  "sodium (COLACE) 100 MG capsule Take 1 capsule (100 mg total) by mouth 3 (three) times daily as needed for Constipation.    insulin detemir U-100 (LEVEMIR FLEXTOUCH) 100 unit/mL (3 mL) SubQ InPn pen Inject 7 Units into the skin once daily.    insulin lispro (HUMALOG KWIKPEN INSULIN) 100 unit/mL pen Inject 6 Units into the skin 3 (three) times daily with meals. Plus SSI: 180 - 230 + 2 unit; 231- 280  + 4 units; 281 - 330 + 6 units; 331 - 380 + 8 units; > 380   + 10 units. Max TDD 48 units. (Patient taking differently: Inject 12 Units into the skin 3 (three) times daily with meals. Plus SSI: 180 - 230 + 2 unit; 231- 280  + 4 units; 281 - 330 + 6 units; 331 - 380 + 8 units; > 380   + 10 units. Max TDD 48 units.)    k phos di & mono-sod phos mono (K-PHOS-NEUTRAL) 250 mg Tab Take 2 tablets by mouth 2 (two) times a day.    lancets Misc 1 each by Misc.(Non-Drug; Combo Route) route 3 (three) times daily.    multivitamin Tab Take 1 tablet by mouth once daily.    mycophenolate (CELLCEPT) 250 mg Cap Take 4 capsules (1,000 mg total) by mouth 2 (two) times daily.    NIFEdipine (PROCARDIA-XL) 30 MG (OSM) 24 hr tablet Take 1 tablet (30 mg total) by mouth 2 (two) times a day.    pantoprazole (PROTONIX) 40 MG tablet Take 1 tablet (40 mg total) by mouth once daily.    pen needle, diabetic (EASY COMFORT PEN NEEDLES) 32 gauge x 5/32" Ndle Inject 1 each into the skin 3 (three) times daily.    predniSONE (DELTASONE) 5 MG tablet Take 20 mg by mouth once daily from 12/28-1/27/22;  Take 15mg daily from 1/28-2/27;   Take 10mg daily from 2/28-3/27; Take 5 mg daily thereafter beginning 3/28/22 (Patient taking differently: Take 20 mg by mouth once daily from 12/28-1/27/22;  Take 15mg daily from 1/28-2/27;   Take 10mg daily from 2/28-3/27; Take 5 mg daily thereafter beginning 3/28/22)    rivaroxaban (XARELTO DVT-PE TREAT 30D START) 15 mg (42)- 20 mg (9) tablet dose pack Take 1 tablet (15 mg) by mouth twice daily with food for 21 days " followed by 1 tablet (20 mg) by mouth once daily with food    tacrolimus (PROGRAF) 1 MG Cap Take 5 capsules (5 mg total) by mouth every morning AND 5 capsules (5 mg total) every evening.    traMADoL (ULTRAM) 50 mg tablet Take 1 tablet (50 mg total) by mouth every 6 (six) hours as needed for Pain. (Patient not taking: No sig reported)    valGANciclovir (VALCYTE) 450 mg Tab Take 2 tablets (900 mg total) by mouth once daily. STOP 3/25/22 (Patient not taking: Reported on 6/22/2022)     No current facility-administered medications for this visit.       Review of Systems   Constitutional: Negative.    HENT: Negative.    Respiratory: Negative for shortness of breath.    Cardiovascular: Negative for chest pain and leg swelling.   Gastrointestinal: Positive for diarrhea. Negative for abdominal pain, nausea and vomiting.   Genitourinary: Negative for difficulty urinating.   Skin: Negative for rash.   Allergic/Immunologic: Positive for immunocompromised state.       Objective:     Blood pressure (!) 144/70, pulse 67, temperature 97.3 °F (36.3 °C), temperature source Temporal, resp. rate 17, height 6' (1.829 m), weight 89.5 kg (197 lb 5 oz), SpO2 98 %.body mass index is 26.76 kg/m².    Physical Exam  Constitutional:       Appearance: He is well-developed.   HENT:      Head: Normocephalic.   Eyes:      Conjunctiva/sclera: Conjunctivae normal.   Cardiovascular:      Rate and Rhythm: Normal rate and regular rhythm.   Pulmonary:      Effort: Pulmonary effort is normal.      Breath sounds: Normal breath sounds.   Abdominal:      Palpations: Abdomen is soft. There is no mass.      Tenderness: There is no abdominal tenderness.   Skin:     General: Skin is dry.      Findings: No rash.   Neurological:      Mental Status: He is alert and oriented to person, place, and time.   Psychiatric:         Judgment: Judgment normal.     Labs:  Lab Results   Component Value Date    WBC 11.34 01/27/2022    HGB 11.7 (L) 01/27/2022    HCT 36.0 (L)  2022     2022    K 3.6 2022     2022    CO2 28 2022    BUN 14 2022    CREATININE 1.0 2022    EGFRNONAA >60.0 2022    CALCIUM 8.3 (L) 2022    PHOS 3.0 2022    MG 1.5 (L) 2022    ALBUMIN 3.3 (L) 2022    ALBUMIN 3.3 (L) 2022    AST 39 2022     (H) 2022    UTPCR Unable to calculate 2022    .5 (H) 2021    TACROLIMUS 7.1 2022       Lab Results   Component Value Date    EXTWBC 6.6 2022    EXTSEGS 46 2022    EXTPLATELETS 278 2022    EXTHEMOGLOBI 11.5 (A) 2022    EXTHEMATOCRI 38.3 (A) 2022    EXTCREATININ 1.3 2022    EXTCREATININ 24.6 2022    EXTSODIUM 140 2022    EXTPOTASSIUM 4.0 2022    EXTBUN 20 2022    EXTCO2 24 2022    EXTCALCIUM 9.0 2022    EXTPHOSPHORU 3.3 2022    EXTGLUCOSE 125 (A) 2022    EXTALBUMIN 3.9 2022    EXTAST 94 (A) 2022    EXTALT 185 (A) 2022    EXTBILITOTAL 1.8 (A) 2022       Lab Results   Component Value Date    EXTTACROLVL 11.1 2022    EXTPROTCRE 0.14 2022    EXTPTHINTACT 171.1 (A) 2022    EXTPROTEINUA 16 2022    EXTWBCUA 0-2 2022    EXTRBCUA NONE SEEN 2022       Labs were reviewed with the patient.    Assessment:     1. Chronic kidney disease (CKD), stage II (mild)    2. -donor kidney transplant    3. Immunosuppressive management encounter following kidney transplant    4. At risk for opportunistic infections    5. Hypertension secondary to other renal disorders    6. Hypophosphatemia    7. Diarrhea, unspecified type      Plan:   [facility] KPN  Obtain allograft ultrasound to follow-up on renal mass resected from allograft prior to transplant [he wishes to schedule in October when have ultrasound of the LE to f/u on DVT, which is acceptable]  Removed valcyte which he prev stopped    CKD 2 following DD kidney transplant  12/25/2021  Screening for proteinuria  - negligible proteinuria.  Doing very well overall.  -he has a history of a renal mass that was resected pre implantation of allograft.  He was wondering if any follow-up needed to be done.  We will get repeated ultrasound in the near future.    Immunosuppression Management  - continue with current triple regimen of tacrolimus, mycophenolate, prednisone  -target tacro level is 7-9  -Continue monitoring for toxicities and SE, none presently noted    Diarrhea/soft stools for 2-3 months - following with GI.  We discussed this is possibly related to his immunosuppression, but discontinuing beds is problematic, which he understands.  Will watch and see if discontinuing K-Phos improve his symptoms at all he reminded keep us posted    At risk for opportunistic infections  -Anti-infective prophylaxis against opportunistic infections  -completed prophylaxis  -Screening for BK infection to prevent allograft dysfunction  -BK screen neg  -Continue blood PCR screening as per guidelines to prevent BK viremia and nephropathy leading to allograft dysfunction and potential graft failure in 2 due    Renal hypertension  -hypertension control appears to be good at home.  He is asked to keep monitoring on current regimen.    Metabolic bone disease [Secondary hyperparathyroidism (SPTH), Phosphorus metabolism disorders]  - labs acceptable  - hypophosphatemia much improved.  Stop K-Phos neutral today    Assessment for bone marrow suppression (r/t immunosuppression toxicity or infection)  - mild anemia of chronic disease or CKD noted.  Otherwise count acceptable         Follow-up:   Clinic: return to transplant clinic weekly for the first month after transplant; every 2 weeks during months 2-3; then at 6-, 9-, 12-, 18-, 24-, and 36- months post-transplant to reassess for complications from immunosuppression toxicity and monitor for rejection.  Annually thereafter.    Labs: since patient remains at high  risk for rejection and drug-related complications that warrant close monitoring, labs will be ordered as follows: continue twice weekly CBC, renal panel, and drug level for first month; then same labs once weekly through 3rd month post-transplant.  Urine for UA and protein/creatinine ratio monthly.  Serum BK - PCR at 1-, 3-, 6-, 9-, 12-, 18-, 24-, 36- 48-, and 60 months post-transplant.  Hepatic panel at 1-, 2-, 3-, 6-, 9-, 12-, 18-, 24-, and 36- months post-transplant.    Guerline Goel MD       Education:   Material provided to the patient.  Patient reminded to call with any health changes since these can be early signs of significant complications.  Also, I advised the patient to be sure any new medications or changes of old medications are discussed with either a pharmacist or physician knowledgeable with transplant to avoid rejection/drug toxicity related to significant drug interactions.    Patient advised that it is recommended that all transplanted patients, and their close contacts and household members receive Covid vaccination.    UNOS Patient Status  Functional Status: 90% - Able to carry on normal activity: minor symptoms of disease  Physical Capacity: No Limitations

## 2022-06-22 NOTE — LETTER
June 22, 2022        Héctor Calvillo  [address]  [facility]  [place] [address]  Phone: [phone]  Fax: [phone]             [facility] 1st Fl  [address]  [facility] [address]  Phone: [phone]   Patient: Antwon Valdez   MR Number: [id number]   YOB: 1965   Date of Visit: 6/22/2022       Dear Dr. Héctor Calvillo    Thank you for referring Antwon Valdez to me for evaluation. Attached you will find relevant portions of my assessment and plan of care.    If you have questions, please do not hesitate to call me. I look forward to following Antwon Valdez along with you.    Sincerely,    Guerline Goel MD    Enclosure    If you would like to receive this communication electronically, please contact [email or web address] or [phone] to request [employer] Link access.    [employer] is a tool which provides read-only access to select patient information with whom you have a relationship. Its easy to use and provides real time access to review your patients record including encounter summaries, notes, results, and demographic information.    If you feel you have received this communication in error or would no longer like to receive these types of communications, please e-mail [email or web address]

## 2022-06-23 ENCOUNTER — PATIENT MESSAGE (OUTPATIENT)
Dept: TRANSPLANT | Facility: CLINIC | Age: 57
End: 2022-06-23
Payer: MEDICARE

## 2022-06-23 DIAGNOSIS — Z94.0 DECEASED-DONOR KIDNEY TRANSPLANT: Primary | ICD-10-CM

## 2022-06-23 LAB
ANA SER QL IF: NORMAL
HAV IGG SER QL IA: NEGATIVE
HAV IGM SERPL QL IA: NEGATIVE

## 2022-06-24 LAB
MITOCHONDRIA AB TITR SER IF: NORMAL {TITER}
SMOOTH MUSCLE AB TITR SER IF: NORMAL {TITER}

## 2022-06-27 LAB
GLIADIN PEPTIDE IGA SER-ACNC: 6 UNITS
GLIADIN PEPTIDE IGG SER-ACNC: 2 UNITS
IGA SERPL-MCNC: 409 MG/DL (ref 70–400)
PETH 16:0/18.1 (POPETH): <10 NG/ML
PETH 16:0/18.2 (PLPETH): <10 NG/ML
TTG IGA SER-ACNC: 6 UNITS
TTG IGG SER-ACNC: 3 UNITS

## 2022-06-28 ENCOUNTER — TELEPHONE (OUTPATIENT)
Dept: HEPATOLOGY | Facility: CLINIC | Age: 57
End: 2022-06-28
Payer: MEDICARE

## 2022-06-28 NOTE — TELEPHONE ENCOUNTER
Received call from Phuc with the [facility] in [place]. The patient is a patient of ours. He has US Abd orders in hand.  We informed him we only do Vascular Studies.  Patient stating we should have set this up a week ago.  Phuc informed that the patient was to bring the Orders to have them done in Radiology.  I will fax the Orders the same as he has in hand to [facility].  Orders fax for US Abd Complete w/Doppler to [phone] and Phone [phone].  Received receipt of confirmation fax received.

## 2022-07-05 ENCOUNTER — PATIENT MESSAGE (OUTPATIENT)
Dept: TRANSPLANT | Facility: CLINIC | Age: 57
End: 2022-07-05
Payer: MEDICARE

## 2022-07-05 ENCOUNTER — DOCUMENTATION ONLY (OUTPATIENT)
Dept: TRANSPLANT | Facility: CLINIC | Age: 57
End: 2022-07-05
Payer: MEDICARE

## 2022-07-06 ENCOUNTER — TELEPHONE (OUTPATIENT)
Dept: TRANSPLANT | Facility: CLINIC | Age: 57
End: 2022-07-06
Payer: MEDICARE

## 2022-07-06 ENCOUNTER — PATIENT MESSAGE (OUTPATIENT)
Dept: TRANSPLANT | Facility: CLINIC | Age: 57
End: 2022-07-06
Payer: MEDICARE

## 2022-07-06 NOTE — TELEPHONE ENCOUNTER
----- Message from Lisa uL RN sent at 7/6/2022 12:28 PM CDT -----  Regarding: FW: Orders  Contact: [phone]    ----- Message -----  From: Janene Celeste  Sent: 7/6/2022   7:58 AM CDT  [facility]  Subject: Orders                                           Patient Antwon is calling to speak with Nurse Hang Campbell. Please call patient at [phone]    Thank You

## 2022-07-07 ENCOUNTER — DOCUMENTATION ONLY (OUTPATIENT)
Dept: TRANSPLANT | Facility: CLINIC | Age: 57
End: 2022-07-07
Payer: MEDICARE

## 2022-07-07 LAB
EXT ALBUMIN: 3.9 (ref 3.7–5.3)
EXT BUN: 17 (ref 6–20)
EXT CALCIUM: 9.2 (ref 8.5–10.5)
EXT CHLORIDE: 110 (ref 101–112)
EXT CO2: 20 (ref 18–32)
EXT CREATININE: 1.36 MG/DL
EXT EGFR NO RACE VARIABLE: 60
EXT EOSINOPHIL%: 2.3
EXT GLUCOSE: 131 (ref 74–106)
EXT HEMATOCRIT: 49.5 (ref 43.5–53.7)
EXT HEMOGLOBIN: 15.2 (ref 14.1–18.1)
EXT LYMPH%: 29.6
EXT MAGNESIUM: 1.8 (ref 1.3–2.1)
EXT MONOCYTES%: 13.1
EXT PHOSPHORUS: 3.2 (ref 2.7–4.5)
EXT PLATELETS: 286 (ref 142–424)
EXT POTASSIUM: 4.6 (ref 3.4–5.1)
EXT SEGS%: 54.1
EXT SODIUM: 141 MMOL/L (ref 135–145)
EXT TACROLIMUS LVL: 8
EXT WBC: 6.6 (ref 4.6–10.2)

## 2022-07-08 ENCOUNTER — OFFICE VISIT (OUTPATIENT)
Dept: TRANSPLANT | Facility: CLINIC | Age: 57
End: 2022-07-08
Payer: MEDICARE

## 2022-07-08 VITALS
HEIGHT: 72 IN | DIASTOLIC BLOOD PRESSURE: 63 MMHG | RESPIRATION RATE: 16 BRPM | HEART RATE: 78 BPM | TEMPERATURE: 98 F | SYSTOLIC BLOOD PRESSURE: 114 MMHG | BODY MASS INDEX: 26.49 KG/M2 | OXYGEN SATURATION: 97 % | WEIGHT: 195.56 LBS

## 2022-07-08 DIAGNOSIS — Z94.0 KIDNEY REPLACED BY TRANSPLANT: ICD-10-CM

## 2022-07-08 DIAGNOSIS — Z51.81 ENCOUNTER FOR THERAPEUTIC DRUG MONITORING: Primary | ICD-10-CM

## 2022-07-08 PROCEDURE — 99999 PR PBB SHADOW E&M-EST. PATIENT-LVL IV: CPT | Mod: PBBFAC,,,

## 2022-07-08 PROCEDURE — 99999 PR PBB SHADOW E&M-EST. PATIENT-LVL IV: ICD-10-PCS | Mod: PBBFAC,,,

## 2022-07-08 PROCEDURE — 99214 OFFICE O/P EST MOD 30 MIN: CPT | Mod: PBBFAC

## 2022-07-08 PROCEDURE — 99214 OFFICE O/P EST MOD 30 MIN: CPT | Mod: 24,S$PBB,, | Performed by: SURGERY

## 2022-07-08 PROCEDURE — 99214 PR OFFICE/OUTPT VISIT, EST, LEVL IV, 30-39 MIN: ICD-10-PCS | Mod: 24,S$PBB,, | Performed by: SURGERY

## 2022-07-08 NOTE — PROGRESS NOTES
Transplant Surgery  Kidney Transplant Recipient Follow-up    Referring Physician: Héctor Calvillo  Current Nephrologist: Héctor Calvillo    Subjective:     Chief Complaint: Antwon Valdez is a 57 y.o. year old White male who is status post Kidney transplant performed on 12/25/2021.  Here for pain at incision and in area of closure. Denies fever, chills and other symptoms.    ORGAN:  RIGHT KIDNEY  Disease Etiology: Diabetes Mellitus - Type Other / Unknown  Donor Type:  Donation after Brain Death  Donor CMV Status:  Positive  Donor HBcAB:  Negative  Donor HCV Status:  Negative    History of Present Illness: He reports no concerns.  From a transplant perspective, he reports normal urination.  Antwon is here for management of his immunosuppression medication.  Antwon states that his immunosuppression is being well tolerated.  Hypertension is not present.    External provider notes reviewed: No    Review of Systems   Constitutional: Negative for fatigue.   HENT: Negative for drooling, postnasal drip and sore throat.    Eyes: Negative for discharge and itching.   Respiratory: Negative for choking and stridor.    Gastrointestinal: Negative for rectal pain.   Endocrine: Negative for polydipsia.   Genitourinary: Negative for enuresis and genital sores.   Musculoskeletal: Negative for back pain, neck pain and neck stiffness.   Allergic/Immunologic: Positive for immunocompromised state.   Neurological: Negative for facial asymmetry and numbness.   Hematological: Negative for adenopathy.   Psychiatric/Behavioral: Negative for behavioral problems, self-injury and suicidal ideas.       Objective:     Physical Exam  Abdominal:          Comments: incision well healed    Attenuation of fascia above the upper edge of anastomosis       Lab Results   Component Value Date    CREATININE 1.3 06/22/2022    BUN 17 06/22/2022     Lab Results   Component Value Date    WBC 6.12 06/22/2022    HGB 13.5 (L) 06/22/2022    HCT 44.3  06/22/2022    HCT 28 (L) 12/25/2021     06/22/2022     Lab Results   Component Value Date    TACROLIMUS 7.1 01/27/2022       Diagnostics:  The following labs have been reviewed: CBC  CMP      Assessment and Plan:        · S/P Kidney transplant.  · Chronic immunosuppressive medications for rejection prophylaxis at target.  Plan: no adjustment needed.  · Continue monitoring symptoms, labs and drug levels for drug-related toxicity and side effects.  · Renal hypertension at target.  · Possible fluid collection, hernia or neuropraxia: will obtain CT abdo/pelvis with po contrast    Additional testing to be completed according to the Kidney: Written Order Guideline for Kidney Transplant Follow-Up (KI-09)    Interpretation of tests and discussion of patient management involves all members of the multidisciplinary transplant team.  Patient advised that it is recommended that all transplant candidates, and their close contacts and household members receive Covid vaccination.  Follow-up: Patient reminded to call with any health changes, since these can be early signs of significant complications.  Also, I advised the patient to be sure any new medications or changes of old medications are discussed with either a pharmacist, or physician knowledgeable with transplant to avoid rejection/drug toxicity related to significant drug interactions.    Marcio Mckinley MD       [facility] Patient Status  Functional Status: 90% - Able to carry on normal activity: minor symptoms of disease  Physical Capacity: No Limitations

## 2022-07-11 ENCOUNTER — PATIENT MESSAGE (OUTPATIENT)
Dept: TRANSPLANT | Facility: CLINIC | Age: 57
End: 2022-07-11
Payer: MEDICARE

## 2022-07-11 DIAGNOSIS — R19.00 INTRA-ABDOMINAL AND PELVIC SWELLING, MASS AND LUMP, UNSPECIFIED SITE: ICD-10-CM

## 2022-07-14 ENCOUNTER — PATIENT MESSAGE (OUTPATIENT)
Dept: HEPATOLOGY | Facility: CLINIC | Age: 57
End: 2022-07-14

## 2022-07-14 ENCOUNTER — OFFICE VISIT (OUTPATIENT)
Dept: HEPATOLOGY | Facility: CLINIC | Age: 57
End: 2022-07-14
Payer: MEDICARE

## 2022-07-14 DIAGNOSIS — R79.89 ELEVATED LFTS: Primary | ICD-10-CM

## 2022-07-14 DIAGNOSIS — Z94.0 S/P KIDNEY TRANSPLANT: ICD-10-CM

## 2022-07-14 DIAGNOSIS — I10 ESSENTIAL HYPERTENSION: ICD-10-CM

## 2022-07-14 DIAGNOSIS — E78.2 MIXED HYPERLIPIDEMIA: ICD-10-CM

## 2022-07-14 DIAGNOSIS — Z79.4 TYPE 2 DIABETES MELLITUS WITH CHRONIC KIDNEY DISEASE, WITH LONG-TERM CURRENT USE OF INSULIN, UNSPECIFIED CKD STAGE: ICD-10-CM

## 2022-07-14 DIAGNOSIS — E11.22 TYPE 2 DIABETES MELLITUS WITH CHRONIC KIDNEY DISEASE, WITH LONG-TERM CURRENT USE OF INSULIN, UNSPECIFIED CKD STAGE: ICD-10-CM

## 2022-07-14 DIAGNOSIS — Z79.60 LONG-TERM USE OF IMMUNOSUPPRESSANT MEDICATION: ICD-10-CM

## 2022-07-14 PROCEDURE — 99213 PR OFFICE/OUTPT VISIT, EST, LEVL III, 20-29 MIN: ICD-10-PCS | Mod: 95,,, | Performed by: NURSE PRACTITIONER

## 2022-07-14 PROCEDURE — 99213 OFFICE O/P EST LOW 20 MIN: CPT | Mod: 95,,, | Performed by: NURSE PRACTITIONER

## 2022-07-14 NOTE — PATIENT INSTRUCTIONS
- Proceed with Ultrasound of the liver with doppler studies at [facility], as scheduled.   - Repeat liver function tests this month.  - Will also check titer levels for Hepatitis A with next blood draw.  - Avoid alcohol and herbal supplements/alterantive remedies.  - Continue close follow up with Transplant Nephrology team.  - Return to clinic to be determined by lab and ultrasound results.

## 2022-07-14 NOTE — PROGRESS NOTES
The patient location is: [place], [place]  The chief complaint leading to consultation is: Elevated LFT's    Visit type: audiovisual - the visit was conducted primarily by phone, due to technical issues on provider's end.    Face to Face time with patient: 10  20 minutes of total time spent on the encounter, which includes face to face time and non-face to face time preparing to see the patient (eg, review of tests), Obtaining and/or reviewing separately obtained history, Documenting clinical information in the electronic or other health record, Independently interpreting results (not separately reported) and communicating results to the patient/family/caregiver, or Care coordination (not separately reported).     Each patient to whom he or she provides medical services by telemedicine is:  (1) informed of the relationship between the physician and patient and the respective role of any other health care provider with respect to management of the patient; and (2) notified that he or she may decline to receive medical services by telemedicine and may withdraw from such care at any time.    Notes:     [facility] Established Patient Visit    Reason for Visit: Elevated liver enzymes    PCP: Héctor Calvillo    HPI:  This is a 57 y.o. male with PMH noted below, here for follow up for elevated liver enzymes. He was last seen in clinic by myself in 6/2022.    The patient's risk factors for NAFLD/COOK include:     · Obesity                                        No; BMI 26.52  · Dyslipidemia                                Yes; Mild hypertriglyceridemia ( in 3/2022)  · Insulin resistance/Diabetes         Yes; Well controlled - Last HgbA1c was 4.6% (7/2022)    He is S/P kidney transplant in December 2021, and has had elevated liver enzymes in a mixed pattern since 1/2022. He is on Tacrolimus, Mycophenolate and Prednisone. He was previously also on Dapsone, but it was discontinued in early June, due to  acute rise in his LFT's (ALP of 273, ALT of 161, AST of 65, T. Bili was also mildly elevated at 1.5, INR was 1.32, Albumin was 3.9). Repeat LFT's on 6/22/2022 here at [facility] showed significant improvement (, ALT 25, AST 20, T. Bili 0.7, Albumin 4.1, INR 1.1). He has not undergone any recent abdominal imaging. He is scheduled for an abdominal ultrasound with doppler studies at the [facility] on 7/20 (earliest available appointment). He is also followed by GI for chronic diarrhea (developed after cholecystectomy and has worsened as of late). He tried Colestid, but it did not help much with his symptoms, and interacted with his immunosuppressive medications. He is now on Creon per Dr. Shah at [facility]. He has no known family history of liver disease. He does not drink alcohol heavily or use illicit drugs. He is immune to Hepatitis B, through prior vaccination. HCV and HIV negative on prior labs in 12/2021. He is well appearing, and denies any signs or symptoms of decompensated liver disease including jaundice, dark urine, pruritus, abdominal distention, lower extremity edema, hematemesis, melena, or periods of confusion suggestive of hepatic encephalopathy.      PMHX:  has a past medical history of Chronic pulmonary embolism (6/1/2021), Diabetes mellitus, Diabetic nephropathy associated with type 2 diabetes mellitus (6/1/2021), Disorder of kidney and ureter, ESRD (end stage renal disease) (6/1/2021), Essential hypertension (6/1/2021), GERD (gastroesophageal reflux disease), Mixed hyperlipidemia (6/1/2021), and Sleep apnea (6/1/2021).    PSHX:  has a past surgical history that includes Gastric bypass; Cholecystectomy; Colonoscopy; Hernia repair; Cataract extraction; Cardiac catheterization; Removal of catheter; Colonoscopy; Dialysis fistula creation; Gastric bypass; Hardware Removal; Knee surgery; Paracentesis; Peritoneal catheter insertion; Kidney transplant (N/A, 12/25/2021); Peritoneal  catheter removal (N/A, 12/25/2021); and Cystoscopy.    The patient's social and family histories were reviewed by me and updated in the appropriate section of the electronic medical record.    Review of patient's allergies indicates:   Allergen Reactions    Simvastatin Other (See Comments)     cramping     Current Outpatient Medications on File Prior to Visit   Medication Sig Dispense Refill    blood sugar diagnostic (ACCU-CHEK GUIDE TEST STRIPS MISC) 1 each by abdominal subcutaneous route 3 (three) times daily.      blood sugar diagnostic Strp 1 each by Misc.(Non-Drug; Combo Route) route 3 (three) times daily. 100 each 11    blood-glucose meter kit Use as instructed 1 each 0    cholecalciferol, vitamin D3, (VITAMIN D3) 50 mcg (2,000 unit) Tab Take 2,000 Units by mouth once daily.      CREON 36,000-114,000- 180,000 unit CpDR Take by mouth. As directed      cyanocobalamin (VITAMIN B-12) 1000 MCG tablet Take 1,000 mcg by mouth once daily.      docusate sodium (COLACE) 100 MG capsule Take 1 capsule (100 mg total) by mouth 3 (three) times daily as needed for Constipation. 30 capsule 0    insulin detemir U-100 (LEVEMIR FLEXTOUCH) 100 unit/mL (3 mL) SubQ InPn pen Inject 7 Units into the skin once daily. 15 mL 11    insulin lispro (HUMALOG KWIKPEN INSULIN) 100 unit/mL pen Inject 6 Units into the skin 3 (three) times daily with meals. Plus SSI: 180 - 230 + 2 unit; 231- 280  + 4 units; 281 - 330 + 6 units; 331 - 380 + 8 units; > 380   + 10 units. Max TDD 48 units. (Patient taking differently: Inject 12 Units into the skin 3 (three) times daily with meals. Plus SSI: 180 - 230 + 2 unit; 231- 280  + 4 units; 281 - 330 + 6 units; 331 - 380 + 8 units; > 380   + 10 units. Max TDD 48 units.) 15 mL 11    lancets Misc 1 each by Misc.(Non-Drug; Combo Route) route 3 (three) times daily. 100 each 11    multivitamin Tab Take 1 tablet by mouth once daily.      mycophenolate (CELLCEPT) 250 mg Cap Take 4 capsules (1,000 mg  "total) by mouth 2 (two) times daily. 240 capsule 11    NIFEdipine (PROCARDIA-XL) 30 MG (OSM) 24 hr tablet Take 1 tablet (30 mg total) by mouth 2 (two) times a day. 60 tablet 11    pantoprazole (PROTONIX) 40 MG tablet Take 1 tablet (40 mg total) by mouth once daily. 30 tablet 11    pen needle, diabetic (EASY COMFORT PEN NEEDLES) 32 gauge x 5/32" Ndle Inject 1 each into the skin 3 (three) times daily. 100 each 11    predniSONE (DELTASONE) 5 MG tablet Take 20 mg by mouth once daily from 12/28-1/27/22;  Take 15mg daily from 1/28-2/27;   Take 10mg daily from 2/28-3/27; Take 5 mg daily thereafter beginning 3/28/22 (Patient taking differently: Take 20 mg by mouth once daily from 12/28-1/27/22;  Take 15mg daily from 1/28-2/27;   Take 10mg daily from 2/28-3/27; Take 5 mg daily thereafter beginning 3/28/22) 120 tablet 11    rivaroxaban (XARELTO DVT-PE TREAT 30D START) 15 mg (42)- 20 mg (9) tablet dose pack Take 1 tablet (15 mg) by mouth twice daily with food for 21 days followed by 1 tablet (20 mg) by mouth once daily with food      tacrolimus (PROGRAF) 1 MG Cap Take 5 capsules (5 mg total) by mouth every morning AND 5 capsules (5 mg total) every evening. 900 capsule 3    traMADoL (ULTRAM) 50 mg tablet Take 1 tablet (50 mg total) by mouth every 6 (six) hours as needed for Pain. 28 tablet 0    [DISCONTINUED] insulin aspart U-100 (NOVOLOG) 100 unit/mL injection Inject 1-2 Units into the skin as needed for High Blood Sugar.       No current facility-administered medications on file prior to visit.     SOCIAL HISTORY:   Social History     Tobacco Use   Smoking Status Never Smoker   Smokeless Tobacco Never Used     Social History     Substance and Sexual Activity   Alcohol Use Never     Social History     Substance and Sexual Activity   Drug Use Never     ROS:   GENERAL: Denies fever, chills, weight loss/gain, fatigue  HEENT: Denies headaches, dizziness, vision/hearing changes  CARDIOVASCULAR: Denies chest pain, " palpitations, or edema  RESPIRATORY: Denies dyspnea, cough  GI: Denies abdominal pain, rectal bleeding, nausea, vomiting. No change in bowel pattern or color  : Denies dysuria, hematuria   SKIN: Denies rash, itching   NEURO: Denies confusion, memory loss, or mood changes  PSYCH: Denies depression or anxiety  HEME/LYMPH: Denies easy bruising or bleeding    Objective Findings:    PHYSICAL EXAM:   Friendly White male, in no acute distress; alert and oriented to person, place and time.  VITALS: There were no vitals taken for this visit. (Not done - Telehealth visit).  HENT: Normocephalic, without obvious abnormality.  EYES: Sclerae anicteric.  NECK: No obvious masses.  CARDIOVASCULAR: No peripheral edema, per patient report.  RESPIRATORY: Normal respiratory effort.  GI: Unable to assess.  EXTREMITIES: Unable to assess.  SKIN: Warm and dry. No jaundice. No rashes noted to exposed skin.   NEURO: Unable to assess.  PSYCH:  Memory intact. Thought and speech pattern appropriate. Behavior normal. No depression or anxiety noted.    DIAGNOSTIC STUDIES:    ABD. U/S - Scheduled 7/20/2022 at  [facility].    ASSESSMENT & PLAN:  57 y.o. White male with:    1. Elevated LFTs  Hepatic Function Panel    Hepatic Function Panel    Hepatitis A antibody, IgG    Hepatitis A antibody, IgG    Hepatitis A Antibody, IgM    Hepatitis A Antibody, IgM    CANCELED: Hepatitis A Antibody, IgM   2. S/P kidney transplant  Hepatic Function Panel    Hepatic Function Panel    Hepatitis A antibody, IgG    Hepatitis A antibody, IgG    Hepatitis A Antibody, IgM    Hepatitis A Antibody, IgM    CANCELED: Hepatitis A Antibody, IgM   3. Long-term use of immunosuppressant medication  Hepatic Function Panel    Hepatic Function Panel    Hepatitis A antibody, IgG    Hepatitis A antibody, IgG    Hepatitis A Antibody, IgM    Hepatitis A Antibody, IgM    CANCELED: Hepatitis A Antibody, IgM   4. Type 2 diabetes mellitus with chronic kidney disease, with  long-term current use of insulin, unspecified CKD stage  Hepatic Function Panel    Hepatic Function Panel    Hepatitis A antibody, IgG    Hepatitis A antibody, IgG    Hepatitis A Antibody, IgM    Hepatitis A Antibody, IgM    CANCELED: Hepatitis A Antibody, IgM   5. Mixed hyperlipidemia  Hepatic Function Panel    Hepatic Function Panel    Hepatitis A antibody, IgG    Hepatitis A antibody, IgG    Hepatitis A Antibody, IgM    Hepatitis A Antibody, IgM    CANCELED: Hepatitis A Antibody, IgM   6. Essential hypertension  Hepatic Function Panel    Hepatic Function Panel    Hepatitis A antibody, IgG    Hepatitis A antibody, IgG    Hepatitis A Antibody, IgM    Hepatitis A Antibody, IgM    CANCELED: Hepatitis A Antibody, IgM     - Proceed with Ultrasound of the liver with doppler studies at [facility], as scheduled.   - Repeat liver function tests this month.  - Will also check titer levels for Hepatitis A with next blood draw.  - Avoid alcohol and herbal supplements/alterantive remedies.  - Continue close follow up with Transplant Nephrology team.  - Return to clinic to be determined by lab and ultrasound results.    Thank you for allowing me to participate in the care of Antwon Valdez       Hepatology Nurse Practitioner  [facility]  7/14/2022 @ 1050    CC'ed note to:   No ref. provider found  Héctor Calvillo MD

## 2022-07-20 ENCOUNTER — PATIENT MESSAGE (OUTPATIENT)
Dept: HEPATOLOGY | Facility: CLINIC | Age: 57
End: 2022-07-20
Payer: MEDICARE

## 2022-07-20 ENCOUNTER — PATIENT MESSAGE (OUTPATIENT)
Dept: TRANSPLANT | Facility: CLINIC | Age: 57
End: 2022-07-20
Payer: MEDICARE

## 2022-07-21 ENCOUNTER — TELEPHONE (OUTPATIENT)
Dept: TRANSPLANT | Facility: CLINIC | Age: 57
End: 2022-07-21
Payer: MEDICARE

## 2022-07-22 ENCOUNTER — TELEPHONE (OUTPATIENT)
Dept: TRANSPLANT | Facility: CLINIC | Age: 57
End: 2022-07-22
Payer: MEDICARE

## 2022-07-22 ENCOUNTER — PATIENT MESSAGE (OUTPATIENT)
Dept: TRANSPLANT | Facility: CLINIC | Age: 57
End: 2022-07-22
Payer: MEDICARE

## 2022-07-26 ENCOUNTER — PATIENT MESSAGE (OUTPATIENT)
Dept: TRANSPLANT | Facility: CLINIC | Age: 57
End: 2022-07-26
Payer: MEDICARE

## 2022-08-07 ENCOUNTER — PATIENT MESSAGE (OUTPATIENT)
Dept: TRANSPLANT | Facility: CLINIC | Age: 57
End: 2022-08-07
Payer: COMMERCIAL

## 2022-08-11 ENCOUNTER — DOCUMENTATION ONLY (OUTPATIENT)
Dept: TRANSPLANT | Facility: CLINIC | Age: 57
End: 2022-08-11
Payer: COMMERCIAL

## 2022-08-12 ENCOUNTER — PATIENT MESSAGE (OUTPATIENT)
Dept: TRANSPLANT | Facility: CLINIC | Age: 57
End: 2022-08-12
Payer: COMMERCIAL

## 2022-08-12 ENCOUNTER — TELEPHONE (OUTPATIENT)
Dept: TRANSPLANT | Facility: CLINIC | Age: 57
End: 2022-08-12
Payer: COMMERCIAL

## 2022-08-12 NOTE — TELEPHONE ENCOUNTER
Message sent to patient regarding below.----- Message from Marcio Mckinley MD sent at 8/12/2022 10:37 AM CDT -----  Regarding: RE: CT results  Just reviewed CT scan: there is no evidence of hernia, infection or hematoma.      If he has ongoing pain - he may benefit form a nerve block.  That is best set up in [place] with a pain specialist.      Hope this helps    ICC  ----- Message -----  From: Hang Campbell RN  Sent: 8/12/2022   9:28 AM CDT  To: Marcio Mckinley MD  Subject: CT results                                       [facility]. They are in the chart.  Thanks

## 2022-08-15 ENCOUNTER — DOCUMENTATION ONLY (OUTPATIENT)
Dept: TRANSPLANT | Facility: CLINIC | Age: 57
End: 2022-08-15
Payer: COMMERCIAL

## 2022-08-15 LAB
EXT ALBUMIN: 3.6 (ref 3.7–5.3)
EXT BUN: 17 (ref 6–20)
EXT CALCIUM: 9.4 (ref 8.5–10.5)
EXT CHLORIDE: 111 (ref 101–112)
EXT CO2: 20 (ref 18–32)
EXT CREATININE: 1.3 MG/DL
EXT EGFR NO RACE VARIABLE: <60
EXT EOSINOPHIL%: 4.1
EXT GLUCOSE: 135 (ref 74–106)
EXT HEMATOCRIT: 54.4 (ref 43.5–53.7)
EXT HEMOGLOBIN: 16.5 (ref 14.1–18.1)
EXT LYMPH%: 38.6
EXT MAGNESIUM: 2 (ref 1.3–2.1)
EXT MONOCYTES%: 9.2
EXT PHOSPHORUS: 3.3 (ref 2.7–4.5)
EXT PLATELETS: 302 (ref 142–424)
EXT POTASSIUM: 4.3 (ref 3.4–5.1)
EXT SEGS%: 47.2
EXT SODIUM: 140 MMOL/L (ref 135–145)
EXT TACROLIMUS LVL: 7.5
EXT WBC: 9.7 (ref 4.6–10.2)

## 2022-09-14 ENCOUNTER — DOCUMENTATION ONLY (OUTPATIENT)
Dept: TRANSPLANT | Facility: CLINIC | Age: 57
End: 2022-09-14
Payer: COMMERCIAL

## 2022-09-14 ENCOUNTER — PATIENT MESSAGE (OUTPATIENT)
Dept: TRANSPLANT | Facility: CLINIC | Age: 57
End: 2022-09-14
Payer: COMMERCIAL

## 2022-09-14 ENCOUNTER — TELEPHONE (OUTPATIENT)
Dept: TRANSPLANT | Facility: CLINIC | Age: 57
End: 2022-09-14
Payer: COMMERCIAL

## 2022-09-14 LAB
EXT ALBUMIN: 3.6 (ref 3.7–5.3)
EXT ALKALINE PHOSPHATASE: 136 (ref 34–154)
EXT ALLOSURE: ABNORMAL
EXT ALT: 28 (ref 7–55)
EXT AMYLASE: ABNORMAL
EXT ANC: ABNORMAL
EXT AST: 23 (ref 8–35)
EXT BACTERIA UA: NEGATIVE
EXT BANDS%: ABNORMAL
EXT BILIRUBIN DIRECT: 0.4 MG/DL (ref 0–0.4)
EXT BILIRUBIN TOTAL: 1 (ref 0.3–1.2)
EXT BK VIRUS DNA QN PCR: ABNORMAL
EXT BK VIRUS DNA QUANT, PCR, URINE: ABNORMAL
EXT BUN: 24 (ref 6–20)
EXT C PEPTIDE: ABNORMAL
EXT CALCIUM: 9.2 (ref 8.5–10.5)
EXT CHLORIDE: 110 (ref 101–112)
EXT CHOLESTEROL (LIPID PANEL): ABNORMAL
EXT CHOLESTEROL: ABNORMAL
EXT CMV DNA QUANT. BY PCR: ABNORMAL
EXT CO2: 19 (ref 20–32)
EXT CREATININE UA: 40
EXT CREATININE: 1.28 MG/DL (ref 0.6–1.2)
EXT CYCLOSPORINE LVL: ABNORMAL
EXT EBV DNA BY PCR: ABNORMAL
EXT EBV DNA-COPIES/ML: ABNORMAL
EXT EGFR NO RACE VARIABLE: >60
EXT EOSINOPHIL%: 3.3 (ref 0–7)
EXT FERRITIN: ABNORMAL
EXT GFR MDRD AF AMER: ABNORMAL
EXT GFR MDRD NON AF AMER: ABNORMAL
EXT GLUCOSE UA: NEGATIVE
EXT GLUCOSE: 122 (ref 74–106)
EXT HBV DNA QUANT PCR: ABNORMAL
EXT HCV QUANT: ABNORMAL
EXT HDL: ABNORMAL
EXT HEMATOCRIT: 54.8 (ref 43.5–53.7)
EXT HEMOGLOBIN A1C: ABNORMAL
EXT HEMOGLOBIN: 17.3 (ref 14.1–18.1)
EXT HIV RNA QUANT PCR: ABNORMAL
EXT HIV: ABNORMAL
EXT IMMUNKNOW (STIMULATED): ABNORMAL
EXT INR: ABNORMAL
EXT IRON SATURATION: ABNORMAL
EXT LDH, TOTAL: ABNORMAL
EXT LDL CHOLESTEROL: ABNORMAL
EXT LIPASE: ABNORMAL
EXT LYMPH%: 45 (ref 37–80)
EXT MAGNESIUM: 1.7 (ref 1.3–2.1)
EXT MONOCYTES%: 9 (ref 0–12)
EXT NITRITES UA: NEGATIVE
EXT PHOSPHORUS: 3.5 (ref 2.7–4.5)
EXT PLATELETS: 197 (ref 142–424)
EXT POTASSIUM: 3.8 (ref 3.4–5.1)
EXT PROT/CREAT RATIO UR: 0.23
EXT PROT/CREAT RATIO UR: ABNORMAL
EXT PROTEIN TOTAL: 6.3 (ref 6.4–8.3)
EXT PROTEIN TOTAL: ABNORMAL
EXT PROTEIN UA: 9.2
EXT PROTEIN UA: NEGATIVE
EXT PT: ABNORMAL
EXT PTH, INTACT: ABNORMAL
EXT RBC UA: NEGATIVE
EXT SARS COV-2 (COVID-19): ABNORMAL
EXT SEGS%: 42.3 (ref 37–80)
EXT SERUM IRON: ABNORMAL
EXT SIROLIMUS LVL: ABNORMAL
EXT SODIUM: 139 MMOL/L (ref 135–145)
EXT STOOL CDIFF: ABNORMAL
EXT STOOL CMV: ABNORMAL
EXT STOOL CULTURE: ABNORMAL
EXT STOOL OCP: ABNORMAL
EXT TACROLIMUS LVL: 13.9
EXT TIBC: ABNORMAL
EXT TRIGLYCERIDES: ABNORMAL
EXT UNSATURATED IRON BINDING CAP.: ABNORMAL
EXT URIC ACID: ABNORMAL
EXT URINE APPEARANCE: CLEAR
EXT URINE COLOR: YELLOW
EXT URINE CULTURE: ABNORMAL
EXT URINE OCCULT BLOOD: NEGATIVE
EXT URINE PH: 6 (ref 5–6)
EXT URINE SP GRAVITY: 1.01 (ref 1.02–1.02)
EXT URINE SP GRAVITY: 1.01 (ref 1.02–1.02)
EXT VIT D 25 HYDROXY: ABNORMAL
EXT WBC UA: NEGATIVE
EXT WBC: 8.1 (ref 4.6–10.2)

## 2022-09-14 NOTE — TELEPHONE ENCOUNTER
Message sent to patient.----- Message from Guerline Goel MD sent at 9/14/2022  1:30 PM CDT -----  Please verify this level is accurate, meds have not changed [ new ones added or removed], and repeat a 12 hr trough.

## 2022-09-15 ENCOUNTER — DOCUMENTATION ONLY (OUTPATIENT)
Dept: TRANSPLANT | Facility: CLINIC | Age: 57
End: 2022-09-15
Payer: COMMERCIAL

## 2022-09-15 ENCOUNTER — PATIENT MESSAGE (OUTPATIENT)
Dept: TRANSPLANT | Facility: CLINIC | Age: 57
End: 2022-09-15
Payer: COMMERCIAL

## 2022-09-15 DIAGNOSIS — Z94.0 S/P KIDNEY TRANSPLANT: ICD-10-CM

## 2022-09-15 LAB — EXT TACROLIMUS LVL: 11.4

## 2022-09-15 RX ORDER — TACROLIMUS 1 MG/1
CAPSULE ORAL
Qty: 810 CAPSULE | Refills: 3 | Status: SHIPPED | OUTPATIENT
Start: 2022-09-15 | End: 2022-09-21

## 2022-09-15 NOTE — TELEPHONE ENCOUNTER
Message sent to patient----- Message from Dhruv Spicer Jr., MD sent at 9/15/2022 12:38 PM CDT -----  Thank you for info! Please decrease dose to 5mg po qam and 4mg po qpm and repeat trough next week.

## 2022-09-16 ENCOUNTER — DOCUMENTATION ONLY (OUTPATIENT)
Dept: TRANSPLANT | Facility: CLINIC | Age: 57
End: 2022-09-16
Payer: COMMERCIAL

## 2022-09-16 LAB — EXT BK VIRUS DNA QN PCR: NEGATIVE

## 2022-09-20 ENCOUNTER — TELEPHONE (OUTPATIENT)
Dept: TRANSPLANT | Facility: HOSPITAL | Age: 57
End: 2022-09-20
Payer: COMMERCIAL

## 2022-09-20 ENCOUNTER — DOCUMENTATION ONLY (OUTPATIENT)
Dept: TRANSPLANT | Facility: CLINIC | Age: 57
End: 2022-09-20
Payer: COMMERCIAL

## 2022-09-20 LAB — EXT TACROLIMUS LVL: 10.5

## 2022-09-20 NOTE — TELEPHONE ENCOUNTER
" received "patient advice request" from RN Coordinator Hang Campbell regarding patient's insurance.  reached out to patient over the phone. Patient stated that he wanted to start looking into insurance options for when his [employer] assistance ends at the end of this year. Patient reported his [employer] Medicare Supplement will cost him $2500 every three months.     Patient also asked about the possibility of other insurance assistance programs.  informed patient that he would be responsible for locating replacement assistance when [employer] ends.      informed patient that she would contact transplant  to assist.  emailed transplant  Valentina Smith informing her of patient's concerns, that patient will be in post clinic tomorrow (9/21/22) and that patient would like to speak with them.      remains available.  Katie Sanchez LMSW   "

## 2022-09-21 ENCOUNTER — OFFICE VISIT (OUTPATIENT)
Dept: TRANSPLANT | Facility: CLINIC | Age: 57
End: 2022-09-21
Payer: MEDICARE

## 2022-09-21 VITALS
HEART RATE: 88 BPM | HEART RATE: 88 BPM | DIASTOLIC BLOOD PRESSURE: 76 MMHG | SYSTOLIC BLOOD PRESSURE: 128 MMHG | RESPIRATION RATE: 16 BRPM | SYSTOLIC BLOOD PRESSURE: 128 MMHG | OXYGEN SATURATION: 97 % | HEIGHT: 72 IN | BODY MASS INDEX: 25.86 KG/M2 | DIASTOLIC BLOOD PRESSURE: 76 MMHG | HEIGHT: 72 IN | TEMPERATURE: 97 F | RESPIRATION RATE: 16 BRPM | OXYGEN SATURATION: 97 % | WEIGHT: 190.94 LBS | TEMPERATURE: 97 F | WEIGHT: 190.94 LBS | BODY MASS INDEX: 25.86 KG/M2

## 2022-09-21 DIAGNOSIS — D75.1 SECONDARY POLYCYTHEMIA: ICD-10-CM

## 2022-09-21 DIAGNOSIS — Z94.0 S/P KIDNEY TRANSPLANT: ICD-10-CM

## 2022-09-21 DIAGNOSIS — N18.2 CHRONIC KIDNEY DISEASE (CKD), STAGE II (MILD): Primary | ICD-10-CM

## 2022-09-21 DIAGNOSIS — Z94.0 KIDNEY REPLACED BY TRANSPLANT: ICD-10-CM

## 2022-09-21 DIAGNOSIS — Z94.0 DECEASED-DONOR KIDNEY TRANSPLANT: ICD-10-CM

## 2022-09-21 DIAGNOSIS — Z29.89 PROPHYLACTIC IMMUNOTHERAPY: ICD-10-CM

## 2022-09-21 DIAGNOSIS — Z79.899 IMMUNOSUPPRESSIVE MANAGEMENT ENCOUNTER FOLLOWING KIDNEY TRANSPLANT: ICD-10-CM

## 2022-09-21 DIAGNOSIS — Z91.89 AT RISK FOR OPPORTUNISTIC INFECTIONS: ICD-10-CM

## 2022-09-21 DIAGNOSIS — Z79.899 ENCOUNTER FOR LONG-TERM (CURRENT) USE OF OTHER MEDICATIONS: ICD-10-CM

## 2022-09-21 DIAGNOSIS — Z51.81 ENCOUNTER FOR THERAPEUTIC DRUG MONITORING: ICD-10-CM

## 2022-09-21 DIAGNOSIS — I10 ESSENTIAL HYPERTENSION: ICD-10-CM

## 2022-09-21 DIAGNOSIS — Z94.0 IMMUNOSUPPRESSIVE MANAGEMENT ENCOUNTER FOLLOWING KIDNEY TRANSPLANT: ICD-10-CM

## 2022-09-21 DIAGNOSIS — Z79.60 LONG-TERM USE OF IMMUNOSUPPRESSANT MEDICATION: Primary | ICD-10-CM

## 2022-09-21 PROCEDURE — 99215 OFFICE O/P EST HI 40 MIN: CPT | Mod: S$PBB,,, | Performed by: TRANSPLANT SURGERY

## 2022-09-21 PROCEDURE — 99999 PR PBB SHADOW E&M-EST. PATIENT-LVL IV: CPT | Mod: PBBFAC,,,

## 2022-09-21 PROCEDURE — 99215 PR OFFICE/OUTPT VISIT, EST, LEVL V, 40-54 MIN: ICD-10-PCS | Mod: S$PBB,,, | Performed by: INTERNAL MEDICINE

## 2022-09-21 PROCEDURE — 99215 OFFICE O/P EST HI 40 MIN: CPT | Mod: PBBFAC,27 | Performed by: INTERNAL MEDICINE

## 2022-09-21 PROCEDURE — 99999 PR PBB SHADOW E&M-EST. PATIENT-LVL IV: ICD-10-PCS | Mod: PBBFAC,,,

## 2022-09-21 PROCEDURE — 99215 OFFICE O/P EST HI 40 MIN: CPT | Mod: S$PBB,,, | Performed by: INTERNAL MEDICINE

## 2022-09-21 PROCEDURE — 99214 OFFICE O/P EST MOD 30 MIN: CPT | Mod: PBBFAC

## 2022-09-21 PROCEDURE — 99215 PR OFFICE/OUTPT VISIT, EST, LEVL V, 40-54 MIN: ICD-10-PCS | Mod: S$PBB,,, | Performed by: TRANSPLANT SURGERY

## 2022-09-21 PROCEDURE — 99999 PR PBB SHADOW E&M-EST. PATIENT-LVL V: CPT | Mod: PBBFAC,,, | Performed by: INTERNAL MEDICINE

## 2022-09-21 PROCEDURE — 99999 PR PBB SHADOW E&M-EST. PATIENT-LVL V: ICD-10-PCS | Mod: PBBFAC,,, | Performed by: INTERNAL MEDICINE

## 2022-09-21 RX ORDER — TACROLIMUS 1 MG/1
CAPSULE ORAL
Qty: 720 CAPSULE | Refills: 3 | Status: SHIPPED | OUTPATIENT
Start: 2022-09-21 | End: 2023-01-17 | Stop reason: DRUGHIGH

## 2022-09-21 RX ORDER — VALSARTAN 40 MG/1
40 TABLET ORAL DAILY
Qty: 90 TABLET | Refills: 3 | Status: SHIPPED | OUTPATIENT
Start: 2022-09-21 | End: 2022-09-27

## 2022-09-21 RX ORDER — NIFEDIPINE 30 MG/1
30 TABLET, EXTENDED RELEASE ORAL DAILY
Qty: 30 TABLET | Refills: 11 | Status: SHIPPED | OUTPATIENT
Start: 2022-09-21 | End: 2023-09-21

## 2022-09-21 RX ORDER — TACROLIMUS 1 MG/1
CAPSULE ORAL
Qty: 720 CAPSULE | Refills: 3 | Status: CANCELLED | OUTPATIENT
Start: 2022-09-21 | End: 2023-09-21

## 2022-09-21 NOTE — PROGRESS NOTES
Kidney Post-Transplant Assessment    Referring Physician: Héctor Calvillo  Current Nephrologist: Héctor Calvillo    ORGAN: RIGHT KIDNEY  Donor Type: donation after brain death  PHS Increased Risk: no  Cold Ischemia: 1,221 mins  Induction Medications: simulect - basiliximab    Subjective:     CC:  Reassessment of renal allograft function and management of chronic immunosuppression.    HPI:  Mr. Valdez is a 57 y.o. 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  He has CKD stage 2 - GFR 60-89 and his baseline creatinine is between 1.1-1.3. He takes everolimus, prednisone and tacrolimus for maintenance immunosuppression.     0MM offer  gastric bypass c/b severe reflux, treated with esoph dilation  DM 1995  PE, incidentally found large PE 12/2020 treated x 3 mos [4 mos after bariatric and foot surgery, found during w/u for SOB]  4/2022 LE DVT --> indefinite xalrelto  Sleep apnea  Hypotension on midodrine since gastric bypass  DKT 12/25/21; CIT 20+h; KDPI 65%;  1/28/22 high ALT- bactrim d/c'd    POST TRANSPLANT UPDATE 09/21/2022   Antwon feels well and has no complaints. He mentioned he is finishing hyperbarics for foot wound and just completed antibiotics. He denies CP, SOB, LUTS. He always has some RLE edema, and is not concerned. He reports he no longer has a wound.    Current Outpatient Medications   Medication Sig    blood sugar diagnostic (ACCU-CHEK GUIDE TEST STRIPS MISC) 1 each by abdominal subcutaneous route 3 (three) times daily.    blood sugar diagnostic Strp 1 each by Misc.(Non-Drug; Combo Route) route 3 (three) times daily.    blood-glucose meter kit Use as instructed    cholecalciferol, vitamin D3, (VITAMIN D3) 50 mcg (2,000 unit) Tab Take 2,000 Units by mouth once daily.    CREON 36,000-114,000- 180,000 unit CpDR Take by mouth. As directed    cyanocobalamin (VITAMIN B-12) 1000 MCG tablet Take 1,000 mcg by mouth once daily.    docusate sodium (COLACE)  100 MG capsule Take 1 capsule (100 mg total) by mouth 3 (three) times daily as needed for Constipation.    lancets Misc 1 each by Misc.(Non-Drug; Combo Route) route 3 (three) times daily.    multivitamin Tab Take 1 tablet by mouth once daily.    mycophenolate (CELLCEPT) 250 mg Cap Take 4 capsules (1,000 mg total) by mouth 2 (two) times daily.    NIFEdipine (PROCARDIA-XL) 30 MG (OSM) 24 hr tablet Take 1 tablet (30 mg total) by mouth 2 (two) times a day.    pantoprazole (PROTONIX) 40 MG tablet Take 1 tablet (40 mg total) by mouth once daily.    predniSONE (DELTASONE) 5 MG tablet Take 20 mg by mouth once daily from 12/28-1/27/22;  Take 15mg daily from 1/28-2/27;   Take 10mg daily from 2/28-3/27; Take 5 mg daily thereafter beginning 3/28/22 (Patient taking differently: Take 20 mg by mouth once daily from 12/28-1/27/22;  Take 15mg daily from 1/28-2/27;   Take 10mg daily from 2/28-3/27; Take 5 mg daily thereafter beginning 3/28/22)    rivaroxaban (XARELTO DVT-PE TREAT 30D START) 15 mg (42)- 20 mg (9) tablet dose pack Take 1 tablet (15 mg) by mouth twice daily with food for 21 days followed by 1 tablet (20 mg) by mouth once daily with food    tacrolimus (PROGRAF) 1 MG Cap Take 5 capsules (5 mg total) by mouth every morning AND 4 capsules (4 mg total) every evening.    traMADoL (ULTRAM) 50 mg tablet Take 1 tablet (50 mg total) by mouth every 6 (six) hours as needed for Pain.     No current facility-administered medications for this visit.       Review of Systems   Constitutional: Negative.    HENT: Negative.     Respiratory:  Negative for shortness of breath.    Cardiovascular:  Negative for chest pain and leg swelling.   Gastrointestinal:  Negative for abdominal pain.   Genitourinary:  Negative for difficulty urinating.   Skin:  Negative for rash.   Allergic/Immunologic: Positive for immunocompromised state.     Objective:     Blood pressure 128/76, pulse 88, temperature 97.3 °F (36.3 °C), temperature source Temporal,  resp. rate 16, height 6' (1.829 m), weight 86.6 kg (190 lb 14.7 oz), SpO2 97 %.body mass index is 25.89 kg/m².    Physical Exam  Constitutional:       Appearance: He is well-developed.   Cardiovascular:      Rate and Rhythm: Normal rate and regular rhythm.   Pulmonary:      Effort: Pulmonary effort is normal.      Breath sounds: Normal breath sounds. No rales.   Abdominal:      Palpations: Abdomen is soft. There is no mass.      Tenderness: There is no abdominal tenderness.   Skin:     General: Skin is dry.   Neurological:      Mental Status: He is alert and oriented to person, place, and time.   Psychiatric:         Judgment: Judgment normal.   Labs:  Lab Results   Component Value Date    WBC 6.12 06/22/2022    HGB 13.5 (L) 06/22/2022    HCT 44.3 06/22/2022     06/22/2022    K 4.6 06/22/2022     06/22/2022    CO2 21 (L) 06/22/2022    BUN 17 06/22/2022    CREATININE 1.3 06/22/2022    EGFRNONAA >60.0 06/22/2022    CALCIUM 9.5 06/22/2022    PHOS 3.0 01/27/2022    MG 1.5 (L) 01/27/2022    ALBUMIN 4.1 06/22/2022    AST 20 06/22/2022    ALT 25 06/22/2022    UTPCR Unable to calculate 01/24/2022    .5 (H) 12/24/2021    TACROLIMUS 7.1 01/27/2022       Lab Results   Component Value Date    EXTWBC 8.1 09/14/2022    EXTSEGS 42.3 09/14/2022    EXTPLATELETS 197 09/14/2022    EXTHEMOGLOBI 17.3 09/14/2022    EXTHEMATOCRI 54.8 (A) 09/14/2022    EXTCREATININ 1.28 (A) 09/14/2022    EXTCREATININ 40.0 09/14/2022    EXTSODIUM 139 09/14/2022    EXTPOTASSIUM 3.8 09/14/2022    EXTBUN 24 (A) 09/14/2022    EXTCO2 19 (A) 09/14/2022    EXTCALCIUM 9.2 09/14/2022    EXTPHOSPHORU 3.5 09/14/2022    EXTGLUCOSE 122 (A) 09/14/2022    EXTALBUMIN 3.6 (A) 09/14/2022    EXTAST 23 09/14/2022    EXTALT 28 09/14/2022    EXTBILITOTAL 1 09/14/2022       Lab Results   Component Value Date    EXTTACROLVL 10.5 09/20/2022    EXTPROTCRE 0.23 09/14/2022    EXTPTHINTACT 171.1 (A) 03/23/2022    EXTPROTEINUA Negative 09/14/2022    EXTPROTEINUA 9.2  2022    EXTWBCUA Negative 2022    EXTRBCUA Negative 2022     Labs were reviewed with the patient.    Assessment:     1. Chronic kidney disease (CKD), stage II (mild)    2. -donor kidney transplant    3. Immunosuppressive management encounter following kidney transplant    4. At risk for opportunistic infections    5. S/P kidney transplant    6. Essential hypertension    7. Secondary polycythemia        Plan:   RN Plan: Verify which pharmacy he wants tacro  sent (it looks like he is moving rx's to [place])     CKD 2 following DD kidney transplant 2021  Screening for proteinuria  - Remains stable and doing well    Hx renal mass that was resected pre implantation. F/U per urology.    Immunosuppression Management  - continue with current triple regimen of tacrolimus, mycophenolate, prednisone  -target tacro level is 7-9 until 1 yr anniversary. Lower tac dose now  -Continue monitoring for toxicities and SE, none presently noted  -Reminded to let us know oif any infections in future, as they may require reduction of IS    Secondary polycythemia  -I explained diagnosis to patient and plan to start ARB to induce anemia (advised to monitor BPs)    At risk for opportunistic infections  -Anti-infective prophylaxis against opportunistic infections  -completed prophylaxis  -Screening for BK infection to prevent allograft dysfunction  -BK screen neg earlier this mo  -Continue blood PCR screening as per guidelines to prevent BK viremia and nephropathy leading to allograft dysfunction and potential graft failure in 2 due    Renal hypertension  -hypertension control good. Asked to keep watching given BP med change. Now starting nifedipine 30 mg daily and valsartan 40 mg daily    Metabolic bone disease [Secondary hyperparathyroidism (SPTH), Phosphorus metabolism disorders]  - labs acceptable    Reminded to get annual flu shot and new covid booster      Follow-up:   Clinic: return to transplant clinic  weekly for the first month after transplant; every 2 weeks during months 2-3; then at 6-, 9-, 12-, 18-, 24-, and 36- months post-transplant to reassess for complications from immunosuppression toxicity and monitor for rejection.  Annually thereafter.    Labs: since patient remains at high risk for rejection and drug-related complications that warrant close monitoring, labs will be ordered as follows: continue twice weekly CBC, renal panel, and drug level for first month; then same labs once weekly through 3rd month post-transplant.  Urine for UA and protein/creatinine ratio monthly.  Serum BK - PCR at 1-, 3-, 6-, 9-, 12-, 18-, 24-, 36- 48-, and 60 months post-transplant.  Hepatic panel at 1-, 2-, 3-, 6-, 9-, 12-, 18-, 24-, and 36- months post-transplant.    MD ROLANDO Boone Patient Status  Functional Status: 90% - Able to carry on normal activity: minor symptoms of disease  Physical Capacity: No Limitations

## 2022-09-21 NOTE — LETTER
September 21, 2022        Héctor Calvillo  [address]  [facility]  [place] [address]  Phone: [phone]  Fax: [phone]             [facility] Transplant 1st Fl  [address]  [facility] [address]  Phone: [phone]   Patient: Antwon Valdez   MR Number: [id number]   YOB: 1965   Date of Visit: 9/21/2022       Dear Dr. Héctor Calvillo    Thank you for referring Antwon Valdez to me for evaluation. Attached you will find relevant portions of my assessment and plan of care.    If you have questions, please do not hesitate to call me. I look forward to following Antwon Valdez along with you.    Sincerely,    Guerline Goel MD    Enclosure    If you would like to receive this communication electronically, please contact [email or web address] or [phone] to request [facility] Link access.    [facility] Link is a tool which provides read-only access to select patient information with whom you have a relationship. Its easy to use and provides real time access to review your patients record including encounter summaries, notes, results, and demographic information.    If you feel you have received this communication in error or would no longer like to receive these types of communications, please e-mail [email or web address]

## 2022-09-21 NOTE — PROGRESS NOTES
Transplant Surgery  Kidney Transplant Recipient Follow-up    Referring Physician: Héctor Calvillo  Current Nephrologist: Héctor Calvillo    Subjective:     Chief Complaint: Antwon Valdez is a 57 y.o. year old White male who is status post Kidney transplant performed on 12/25/2021.    ORGAN:  RIGHT KIDNEY  Disease Etiology: Diabetes Mellitus - Type Other / Unknown  Donor Type:  Donation after Brain Death  Donor CMV Status:  Positive  Donor HBcAB:  Negative  Donor HCV Status:  Negative    History of Present Illness: He reports no concerns.  From a transplant perspective, he reports normal urination.  Antwon is here for management of his immunosuppression medication.  Antwon states that his immunosuppression is being well tolerated.  Hypertension is not present.    External provider notes reviewed: Yes    Review of Systems   Constitutional:  Negative for activity change, appetite change, chills, diaphoresis, fatigue, fever and unexpected weight change.   HENT:  Negative for congestion, dental problem, ear pain, facial swelling, mouth sores, nosebleeds, sore throat, tinnitus, trouble swallowing and voice change.    Eyes:  Negative for photophobia, pain and visual disturbance.   Respiratory:  Negative for apnea, cough, choking, chest tightness and shortness of breath.    Cardiovascular:  Negative for chest pain, palpitations and leg swelling.   Gastrointestinal:  Negative for abdominal distention, abdominal pain, blood in stool, constipation, diarrhea, nausea and vomiting.   Endocrine: Negative for cold intolerance and heat intolerance.   Genitourinary:  Negative for difficulty urinating, dysuria, flank pain, hematuria and urgency.   Musculoskeletal:  Negative for arthralgias and gait problem.   Skin:  Negative for color change, pallor and rash.   Neurological:  Negative for dizziness, tremors, seizures, syncope and light-headedness.   Hematological:  Negative for adenopathy. Does not bruise/bleed easily.    Psychiatric/Behavioral:  Negative for agitation and confusion.      Objective:     Physical Exam  Constitutional:       General: He is not in acute distress.     Appearance: He is well-developed.   HENT:      Head: Normocephalic and atraumatic.      Mouth/Throat:      Pharynx: No oropharyngeal exudate.   Eyes:      General: No scleral icterus.     Conjunctiva/sclera: Conjunctivae normal.      Pupils: Pupils are equal, round, and reactive to light.   Cardiovascular:      Rate and Rhythm: Normal rate and regular rhythm.      Heart sounds: Normal heart sounds.   Pulmonary:      Effort: Pulmonary effort is normal. No respiratory distress.      Breath sounds: Normal breath sounds.   Abdominal:      General: Bowel sounds are normal. There is no distension.      Palpations: Abdomen is soft.      Tenderness: There is no abdominal tenderness. There is no guarding or rebound.       Musculoskeletal:         General: Normal range of motion.      Cervical back: Normal range of motion and neck supple.   Lymphadenopathy:      Cervical: No cervical adenopathy.   Skin:     General: Skin is warm and dry.      Findings: No erythema or rash.   Neurological:      Mental Status: He is alert and oriented to person, place, and time.   Psychiatric:         Behavior: Behavior normal.     Lab Results   Component Value Date    CREATININE 1.3 06/22/2022    BUN 17 06/22/2022     Lab Results   Component Value Date    WBC 6.12 06/22/2022    HGB 13.5 (L) 06/22/2022    HCT 44.3 06/22/2022    HCT 28 (L) 12/25/2021     06/22/2022     Lab Results   Component Value Date    TACROLIMUS 7.1 01/27/2022       Diagnostics:  The following labs have been reviewed: CBC  BMP  TACROLIMUS LEVEL  The following radiology images have been independently reviewed and interpreted: CT Abd/Pelvis - no hernia, mass, or collection    Assessment and Plan:        S/P Kidney transplant.  Chronic immunosuppressive medications for rejection prophylaxis at target.  Plan:  no adjustment needed.  Continue monitoring symptoms, labs and drug levels for drug-related toxicity and side effects.  Renal hypertension at target.    Additional testing to be completed according to the Kidney: Written Order Guideline for Kidney Transplant Follow-Up (KI-09)    Interpretation of tests and discussion of patient management involves all members of the multidisciplinary transplant team.  Patient advised that it is recommended that all transplant candidates, and their close contacts and household members receive Covid vaccination.  Follow-up: Patient reminded to call with any health changes, since these can be early signs of significant complications.  Also, I advised the patient to be sure any new medications or changes of old medications are discussed with either a pharmacist, or physician knowledgeable with transplant to avoid rejection/drug toxicity related to significant drug interactions.    Pamella Nieto MD       [facility] Patient Status  Functional Status: 90% - Able to carry on normal activity: minor symptoms of disease  Physical Capacity: No Limitations

## 2022-09-21 NOTE — TELEPHONE ENCOUNTER
Patient seen in clinic today. Dose to 4/4./ ----- Message from Guerline Goel MD sent at 9/20/2022  5:33 PM CDT -----  Lower tacro from 5/4 to 4 mg BID

## 2022-09-21 NOTE — PATIENT INSTRUCTIONS
"You have a high hemoglobin (opposite of anemia), called "polycythemia." For this,  Let's add valsartan (Diovan). It's a blood pressure med that actually helps lower the blood count! Since your blood pressure can drop too low, I am cutting down the nifedipine.  "

## 2022-09-26 ENCOUNTER — PATIENT MESSAGE (OUTPATIENT)
Dept: TRANSPLANT | Facility: CLINIC | Age: 57
End: 2022-09-26
Payer: COMMERCIAL

## 2022-09-27 ENCOUNTER — PATIENT MESSAGE (OUTPATIENT)
Dept: TRANSPLANT | Facility: CLINIC | Age: 57
End: 2022-09-27
Payer: COMMERCIAL

## 2022-09-27 RX ORDER — VALSARTAN 80 MG/1
80 TABLET ORAL DAILY
Qty: 90 TABLET | Refills: 3 | Status: SHIPPED | OUTPATIENT
Start: 2022-09-27 | End: 2023-10-06 | Stop reason: SDUPTHER

## 2022-09-27 NOTE — PATIENT INSTRUCTIONS
I sent message telling him to increase valsartan to 80 mg. New rx sent to pharmacy. Also let him know he can take 2 of the 40 mg tabs until he runs out.

## 2022-09-28 ENCOUNTER — PATIENT MESSAGE (OUTPATIENT)
Dept: TRANSPLANT | Facility: CLINIC | Age: 57
End: 2022-09-28
Payer: COMMERCIAL

## 2022-10-11 ENCOUNTER — PATIENT MESSAGE (OUTPATIENT)
Dept: TRANSPLANT | Facility: CLINIC | Age: 57
End: 2022-10-11
Payer: COMMERCIAL

## 2022-10-17 ENCOUNTER — DOCUMENTATION ONLY (OUTPATIENT)
Dept: TRANSPLANT | Facility: CLINIC | Age: 57
End: 2022-10-17
Payer: COMMERCIAL

## 2022-10-17 LAB
EXT ALBUMIN: 3.3 (ref 3.7–5.3)
EXT BUN: 17 (ref 6–20)
EXT CALCIUM: 9 (ref 8.5–10.5)
EXT CHLORIDE: 113 (ref 101–112)
EXT CO2: 19 (ref 18–32)
EXT CREATININE: 1.14 MG/DL
EXT EGFR NO RACE VARIABLE: >60
EXT EOSINOPHIL%: 2.1
EXT GLUCOSE: 104 (ref 74–106)
EXT HEMATOCRIT: 52.9 (ref 43.5–53.7)
EXT HEMOGLOBIN: 16.3 (ref 14.1–18.1)
EXT LYMPH%: 30.9
EXT MAGNESIUM: 2 (ref 1.3–2.1)
EXT MONOCYTES%: 9.5
EXT PHOSPHORUS: 2.6 (ref 2.7–4.5)
EXT PLATELETS: 291 (ref 142–424)
EXT POTASSIUM: 3.9 (ref 3.4–5.1)
EXT SEGS%: 57.2
EXT SODIUM: 141 MMOL/L (ref 135–145)
EXT TACROLIMUS LVL: 8.1
EXT WBC: 9.2 (ref 4.6–10.2)

## 2022-11-02 ENCOUNTER — PATIENT MESSAGE (OUTPATIENT)
Dept: TRANSPLANT | Facility: CLINIC | Age: 57
End: 2022-11-02
Payer: COMMERCIAL

## 2022-11-15 ENCOUNTER — DOCUMENTATION ONLY (OUTPATIENT)
Dept: TRANSPLANT | Facility: CLINIC | Age: 57
End: 2022-11-15
Payer: COMMERCIAL

## 2022-11-15 LAB
EXT ALBUMIN: 3.9 (ref 3.7–5.3)
EXT BUN: 21 (ref 6–20)
EXT CALCIUM: 9.1 (ref 8.5–10.5)
EXT CHLORIDE: 117 (ref 101–112)
EXT CO2: 15 (ref 20–32)
EXT CREATININE: 1.76 MG/DL (ref 0.6–1.2)
EXT EOSINOPHIL%: 2.4 (ref 0–7)
EXT GFR MDRD NON AF AMER: 44
EXT GLUCOSE: 99 (ref 74–106)
EXT HEMATOCRIT: 51.3 (ref 43.5–53.7)
EXT HEMOGLOBIN: 15.6 (ref 14.1–18.1)
EXT LYMPH%: 38 (ref 10–50)
EXT MAGNESIUM: 2.2 (ref 1.3–2.1)
EXT MONOCYTES%: 9.3 (ref 0–12)
EXT PHOSPHORUS: 4 (ref 2.7–4.5)
EXT PLATELETS: 279 (ref 142–424)
EXT POTASSIUM: 4.4 (ref 3.4–5.1)
EXT SEGS%: 49.8 (ref 37–80)
EXT SODIUM: 140 MMOL/L (ref 135–145)
EXT TACROLIMUS LVL: 7.8
EXT WBC: 8.1 (ref 4.6–10.2)

## 2022-11-17 ENCOUNTER — DOCUMENTATION ONLY (OUTPATIENT)
Dept: TRANSPLANT | Facility: CLINIC | Age: 57
End: 2022-11-17
Payer: COMMERCIAL

## 2022-11-17 LAB
EXT ALBUMIN: 3.9 (ref 3.7–5.3)
EXT BUN: 21
EXT CALCIUM: 9.1 (ref 8.5–10.5)
EXT CHLORIDE: 117 (ref 101–112)
EXT CO2: 15 (ref 20–32)
EXT CREATININE: 1.76 MG/DL
EXT EGFR NO RACE VARIABLE: 44
EXT EOSINOPHIL%: 2.4
EXT GLUCOSE: 99 (ref 74–106)
EXT HEMATOCRIT: 51.3 (ref 43.5–53.7)
EXT HEMOGLOBIN: 15.6 (ref 14.1–18.1)
EXT LYMPH%: 38
EXT MAGNESIUM: 2.2 (ref 1.3–2.1)
EXT MONOCYTES%: 9.3
EXT PHOSPHORUS: 4 (ref 2.7–4.5)
EXT PLATELETS: 279 (ref 142–424)
EXT POTASSIUM: 4.4 (ref 3.4–5.1)
EXT SEGS%: 49.8
EXT SODIUM: 140 MMOL/L (ref 135–145)
EXT TACROLIMUS LVL: 7.8
EXT WBC: 8.1 (ref 4.6–10.2)

## 2022-11-18 ENCOUNTER — PATIENT MESSAGE (OUTPATIENT)
Dept: TRANSPLANT | Facility: CLINIC | Age: 57
End: 2022-11-18
Payer: COMMERCIAL

## 2022-11-18 DIAGNOSIS — Z94.0 S/P KIDNEY TRANSPLANT: Primary | ICD-10-CM

## 2022-11-18 NOTE — TELEPHONE ENCOUNTER
----- Message from Guerline Goel MD sent at 11/17/2022  4:48 PM CST -----  Bicarbonate level low again.  Please restart sodium bicarb 1300 mg b.i.d..  Also, assess if he is having diarrhea or any other changes that would explain the more recent drop.

## 2022-11-18 NOTE — TELEPHONE ENCOUNTER
Message sent to patient.----- Message from Guerline Goel MD sent at 11/17/2022  4:52 PM CST -----  MEAGAN noted, and acidosis worse.  Please assess if he has experienced any changes, such as diarrhea.  Ask him to increase fluids and repeat renal panel next week.  Start sodium bicarbonate 1300 mg b.i.d.

## 2022-11-25 ENCOUNTER — DOCUMENTATION ONLY (OUTPATIENT)
Dept: TRANSPLANT | Facility: CLINIC | Age: 57
End: 2022-11-25
Payer: COMMERCIAL

## 2022-11-25 ENCOUNTER — PATIENT MESSAGE (OUTPATIENT)
Dept: TRANSPLANT | Facility: CLINIC | Age: 57
End: 2022-11-25
Payer: COMMERCIAL

## 2022-11-25 LAB
EXT ALBUMIN: 3.8 (ref 3.7–5.3)
EXT ALKALINE PHOSPHATASE: 140 (ref 34–154)
EXT ALT: 38 (ref 7–55)
EXT AST: 22 (ref 8–35)
EXT BACTERIA UA: ABNORMAL
EXT BILIRUBIN DIRECT: 0.3 MG/DL (ref 0–0.4)
EXT BILIRUBIN TOTAL: 0.7 (ref 0.3–1.2)
EXT BK VIRUS DNA QN PCR: NEGATIVE
EXT BUN: 20
EXT CALCIUM: 8.8 (ref 8.5–10.5)
EXT CHLORIDE: 111 (ref 101–112)
EXT CO2: 18 (ref 20–32)
EXT CREATININE UA: 52.4
EXT CREATININE: 1.74 MG/DL
EXT EGFR NO RACE VARIABLE: 45
EXT EOSINOPHIL%: 2.3
EXT GLUCOSE UA: ABNORMAL
EXT GLUCOSE: 128 (ref 74–106)
EXT HEMATOCRIT: 48.8 (ref 43.5–53.7)
EXT HEMOGLOBIN: 15.4 (ref 14.1–18.1)
EXT LYMPH%: 34.2
EXT MAGNESIUM: 2.1 (ref 1.3–2.1)
EXT MONOCYTES%: 8.7
EXT NITRITES UA: ABNORMAL
EXT PHOSPHORUS: 3.3 (ref 2.7–4.5)
EXT PLATELETS: 287 (ref 142–424)
EXT POTASSIUM: 4.7 (ref 3.4–5.1)
EXT PROT/CREAT RATIO UR: 0.29
EXT PROTEIN TOTAL: 6.6 (ref 6.4–8.3)
EXT PROTEIN UA: 15.1
EXT PROTEIN UA: ABNORMAL
EXT RBC UA: ABNORMAL
EXT SEGS%: 54.3
EXT SODIUM: 140 MMOL/L (ref 135–145)
EXT TACROLIMUS LVL: 8.3
EXT UR LEUK ESTERASE: ABNORMAL
EXT URINE APPEARANCE: CLEAR
EXT URINE BILLIRUBIN: ABNORMAL
EXT URINE COLOR: YELLOW
EXT URINE KETONES: ABNORMAL
EXT URINE OCCULT BLOOD: ABNORMAL
EXT URINE PH: 6.5 (ref 5–6)
EXT URINE SP GRAVITY: 1.01 (ref 1.02–1.02)
EXT URINE UROBILLINOGEN: 0.2 (ref 0.2–1)
EXT WBC UA: ABNORMAL
EXT WBC: 7.9 (ref 4.6–10.2)

## 2022-11-25 RX ORDER — SODIUM BICARBONATE 650 MG/1
1300 TABLET ORAL 2 TIMES DAILY
Qty: 120 TABLET | Refills: 11 | Status: SHIPPED | OUTPATIENT
Start: 2022-11-25 | End: 2023-11-25

## 2022-11-29 DIAGNOSIS — Z94.0 S/P KIDNEY TRANSPLANT: Primary | ICD-10-CM

## 2022-11-30 RX ORDER — MYCOPHENOLATE MOFETIL 250 MG/1
1000 CAPSULE ORAL 2 TIMES DAILY
Qty: 240 CAPSULE | Refills: 11 | Status: SHIPPED | OUTPATIENT
Start: 2022-11-30 | End: 2022-12-23 | Stop reason: ALTCHOICE

## 2022-12-13 ENCOUNTER — PATIENT MESSAGE (OUTPATIENT)
Dept: TRANSPLANT | Facility: CLINIC | Age: 57
End: 2022-12-13
Payer: MEDICAID

## 2022-12-19 ENCOUNTER — DOCUMENTATION ONLY (OUTPATIENT)
Dept: TRANSPLANT | Facility: CLINIC | Age: 57
End: 2022-12-19
Payer: MEDICAID

## 2022-12-19 LAB
CASTS, UA: ABNORMAL
EXT ALBUMIN: 3.9 (ref 3.7–5.3)
EXT BACTERIA UA: ABNORMAL
EXT BUN: 18
EXT CALCIUM: 9.2 (ref 8.5–10.5)
EXT CHLORIDE: 108 (ref 101–112)
EXT CO2: 20 (ref 18–32)
EXT CREATININE UA: 141.8
EXT CREATININE: 1.74 MG/DL
EXT EGFR NO RACE VARIABLE: 45
EXT EOSINOPHIL%: 1.8
EXT GLUCOSE UA: ABNORMAL
EXT GLUCOSE: 101 (ref 74–106)
EXT HEMATOCRIT: 48.1 (ref 43.5–53.7)
EXT HEMOGLOBIN: 14.7 (ref 14.1–18.1)
EXT LYMPH%: 37.7
EXT MAGNESIUM: 1.7 (ref 1.3–2.1)
EXT MONOCYTES%: 10.1
EXT NITRITES UA: ABNORMAL
EXT PHOSPHORUS: 3.5 (ref 2.7–4.5)
EXT PLATELETS: 283 (ref 142–424)
EXT POTASSIUM: 3.8 (ref 3.4–5.1)
EXT PROT/CREAT RATIO UR: 0.31
EXT PROTEIN UA: ABNORMAL
EXT RBC UA: ABNORMAL
EXT SEGS%: 50
EXT SODIUM: 140 MMOL/L (ref 135–145)
EXT TACROLIMUS LVL: 8.4
EXT UR LEUK ESTERASE: ABNORMAL
EXT URINE APPEARANCE: CLEAR
EXT URINE BILLIRUBIN: ABNORMAL
EXT URINE COLOR: YELLOW
EXT URINE KETONES: ABNORMAL
EXT URINE OCCULT BLOOD: ABNORMAL
EXT URINE PH: 6
EXT URINE PROTEIN: 43.9
EXT URINE SP GRAVITY: 1.02
EXT URINE UROBILLINOGEN: 0.2
EXT WBC UA: ABNORMAL
EXT WBC: 9.6 (ref 4.6–10.2)
MUCOUS, UA: ABNORMAL /LPF
SQUAMOUS EPITHELIAL, UA: ABNORMAL /LPF

## 2022-12-21 ENCOUNTER — PATIENT MESSAGE (OUTPATIENT)
Dept: TRANSPLANT | Facility: CLINIC | Age: 57
End: 2022-12-21
Payer: MEDICAID

## 2022-12-21 ENCOUNTER — TELEPHONE (OUTPATIENT)
Dept: TRANSPLANT | Facility: CLINIC | Age: 57
End: 2022-12-21
Payer: MEDICAID

## 2022-12-21 DIAGNOSIS — Z94.0 S/P KIDNEY TRANSPLANT: Primary | ICD-10-CM

## 2022-12-21 PROBLEM — N18.9 CKD (CHRONIC KIDNEY DISEASE): Status: ACTIVE | Noted: 2022-12-21

## 2022-12-21 NOTE — TELEPHONE ENCOUNTER
Case discussed with Dr Cordon:   Scr elevated over past few week , ~ 1.7 from 1.0-1.3    Please assess if Patient is still having diarrhea--if yes. Please coordinate  IVF , 2 L NS     He is Scheduled to be seen in clinic on Friday, 12/23--weather  permitting.   While here for the clinic visit please add labs: renal fx panel, DSAs and allosure  If Pt cannot make it to clinic , get  a renal fx panel at his  local lab.     Also , please Order renal bx with a kidney US  Pt will need to hold xarelto 5-6 days prior to biopsy and the exact date to start holding blood thinner will need to be communicated with the patent once we have a date.    Thanks,  Zara

## 2022-12-21 NOTE — PROGRESS NOTES
Kidney Post-Transplant Assessment    Referring Physician: Héctor Calvillo  Current Nephrologist: Héctor Calvillo    ORGAN: RIGHT KIDNEY  Donor Type: donation after brain death  PHS Increased Risk: no  Cold Ischemia: 1,221 mins  Induction Medications: simulect - basiliximab    Subjective:     CC:  Reassessment of renal allograft function and management of chronic immunosuppression.    HPI:  Mr. Valdez is a 57 y.o. 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 His  baseline   creatinine was 1~ 1.3 until 10/2022 and scr has been ~ 1.7.  He takes mycophenolate mofetil, prednisone and tacrolimus for maintenance immunosuppression. His post transplant course has been uncomplicated to date.    Transplant History:  -ESRD secondary to DM.   -on PD until DBD KTX 12/25/21 (Simulect induction, CIT 20h 21m, KDPI 65%, CMV D+/R+).   -Per op note, small mass excised for biopsy at donor site and determined to be benign, but excision site left a 1.5-2cm wide superficial defect that caused expected bleeding after reperfusion that was unable to be sutured due to shape and risk of tearing parenchyma, therefore, evarrest patch applied with complete hemostasis   Pertinent HX:   gastric bypass c/b severe reflux, treated with esoph dilation  DM 1995  PE, incidentally found large PE 12/2020 treated x 3 mos [4 mos after bariatric and foot surgery, found during w/u for SOB]  4/2022 LE DVT --> indefinite xalrelto  Sleep apnea  Hypotension on midodrine since gastric bypass  DKT 12/25/21; CIT 20+h; KDPI 65%;  1/28/22 high ALT- bactrim d/c'd          Last seen by txp 9/2022   Foot wound???healed    Diarrhea--was seen by GI   5/26/22 Stool studies (-)Cdiff (-), WBC rare, pancreatic elastase fecal 138  5/23/22 CMV IgM (-)  11/10/22 colonoscopy (CE)  MICROSCOPIC DIAGNOSIS   A - LARGE INTESTINE, TRANSVERSE COLON - POLYP - COLD SNARE - POLYP   TUBULAR ADENOMA.     B - RANDOM COLON - TISSUE -  COLD FORCEP - BIOPSIES - R/O MICROSCOPIC COLITIS   -    COLONIC MUCOSA WITH NO SIGNIFICANT HISTOLOGIC CHANGE.   -    NEGATIVE FOR ACTIVITY AND CHRONICITY.       Interval History:   States diarrhea has been an issue since having GI /gastric surgery  . Since txp this has worsened. Now after eating he needs to be close to bathroom . Reports stool is very loose not watery. Has also being taking Imodium which will help intermittently.    Intake 2L +  UOP-no problems reported   Peripheral edema-slight on RLE d/t blood clot  BP  BP Readings from Last 3 Encounters:   12/23/22 (!) 171/86   09/21/22 128/76   09/21/22 128/76     Appetite-good  Lab /diagnostic results reviewed with patient today.      Has not  re-establish care with general nephrology Dr. Calvillo in the future.       Current Outpatient Medications   Medication Sig Dispense Refill    azelastine (OPTIVAR) 0.05 % ophthalmic solution Place 1 drop into both eyes 2 (two) times daily.      benzoyl peroxide (BENZAC AC) 10 % external wash wash affected areas once or twice daily      blood sugar diagnostic (ACCU-CHEK GUIDE TEST STRIPS MISC) 1 each by abdominal subcutaneous route 3 (three) times daily.      blood sugar diagnostic Strp 1 each by Misc.(Non-Drug; Combo Route) route 3 (three) times daily. 100 each 11    blood-glucose meter kit Use as instructed 1 each 0    cholecalciferol, vitamin D3, (VITAMIN D3) 50 mcg (2,000 unit) Tab Take 2,000 Units by mouth once daily.      cyanocobalamin (VITAMIN B-12) 1000 MCG tablet Take 1,000 mcg by mouth once daily.      insulin detemir U-100 (LEVEMIR FLEXTOUCH) 100 unit/mL (3 mL) SubQ InPn pen Inject 4 Units into the skin once daily.      ketoconazole (NIZORAL) 2 % shampoo Apply topically twice a week.      lancets Misc 1 each by Misc.(Non-Drug; Combo Route) route 3 (three) times daily. 100 each 11    metronidazole 1% (METROGEL) 1 % Gel Apply topically daily as needed.      multivitamin Tab Take 1 tablet by mouth once daily.       NIFEdipine (PROCARDIA-XL) 30 MG (OSM) 24 hr tablet Take 1 tablet (30 mg total) by mouth once daily. 30 tablet 11    pantoprazole (PROTONIX) 40 MG tablet Take 1 tablet (40 mg total) by mouth once daily. 30 tablet 11    predniSONE (DELTASONE) 5 MG tablet Take 20 mg by mouth once daily from 12/28-1/27/22;  Take 15mg daily from 1/28-2/27;   Take 10mg daily from 2/28-3/27; Take 5 mg daily thereafter beginning 3/28/22 (Patient taking differently: Take 20 mg by mouth once daily from 12/28-1/27/22;  Take 15mg daily from 1/28-2/27;   Take 10mg daily from 2/28-3/27; Take 5 mg daily thereafter beginning 3/28/22) 120 tablet 11    rivaroxaban (XARELTO DVT-PE TREAT 30D START) 15 mg (42)- 20 mg (9) tablet dose pack Take 1 tablet (15 mg) by mouth twice daily with food for 21 days followed by 1 tablet (20 mg) by mouth once daily with food      sodium bicarbonate 650 MG tablet Take 2 tablets (1,300 mg total) by mouth 2 (two) times daily. 120 tablet 11    tacrolimus (PROGRAF) 1 MG Cap Take 4 capsules (4 mg total) by mouth every morning AND 4 capsules (4 mg total) every evening. 720 capsule 3    valsartan (DIOVAN) 80 MG tablet Take 1 tablet (80 mg total) by mouth once daily. 90 tablet 3    CREON 36,000-114,000- 180,000 unit CpDR Take by mouth. As directed      mycophenolate (MYFORTIC) 180 MG TbEC Take 4 tablets (720 mg total) by mouth 2 (two) times daily. 240 tablet 11     No current facility-administered medications for this visit.       Past Medical History:   Diagnosis Date    Chronic pulmonary embolism 6/1/2021    Diabetes mellitus     Diabetic nephropathy associated with type 2 diabetes mellitus 6/1/2021    Disorder of kidney and ureter     ESRD (end stage renal disease) 6/1/2021    Essential hypertension 6/1/2021    GERD (gastroesophageal reflux disease)     Mixed hyperlipidemia 6/1/2021    Sleep apnea 6/1/2021       Review of Systems   Constitutional:  Negative for activity change, appetite change and fever.   HENT:  Negative  for congestion, mouth sores and sore throat.    Eyes:  Positive for visual disturbance.        Wears glasses   Respiratory:  Negative for cough, chest tightness and shortness of breath.    Cardiovascular:  Negative for chest pain, palpitations and leg swelling.   Gastrointestinal:  Positive for diarrhea. Negative for abdominal distention, abdominal pain, constipation and nausea.   Genitourinary:  Negative for difficulty urinating, frequency and hematuria.   Musculoskeletal:  Negative for arthralgias and gait problem.   Skin:  Negative for wound.   Allergic/Immunologic: Positive for immunocompromised state. Negative for environmental allergies and food allergies.   Neurological:  Negative for dizziness, weakness and numbness.   Psychiatric/Behavioral:  Negative for sleep disturbance. The patient is not nervous/anxious.      Objective:   Blood pressure (!) 171/86, pulse 62, temperature 97.3 °F (36.3 °C), temperature source Temporal, resp. rate 16, height 6' (1.829 m), weight 94.3 kg (207 lb 14.3 oz), SpO2 100 %.body mass index is 28.2 kg/m².    Physical Exam  Vitals and nursing note reviewed.   Constitutional:       Appearance: Normal appearance.   HENT:      Head: Normocephalic.   Cardiovascular:      Rate and Rhythm: Normal rate and regular rhythm.      Heart sounds: Normal heart sounds.   Pulmonary:      Effort: Pulmonary effort is normal.      Breath sounds: Normal breath sounds.   Abdominal:      General: Bowel sounds are normal. There is no distension.      Palpations: Abdomen is soft.      Tenderness: There is no abdominal tenderness.      Comments: No bruit noted over the allograft    Musculoskeletal:         General: Normal range of motion.   Skin:     General: Skin is warm and dry.   Neurological:      General: No focal deficit present.      Mental Status: He is alert.   Psychiatric:         Behavior: Behavior normal.       Labs:  Lab Results   Component Value Date    WBC 6.12 06/22/2022    HGB 13.5 (L)  06/22/2022    HCT 44.3 06/22/2022     12/23/2022    K 4.5 12/23/2022     12/23/2022    CO2 20 (L) 12/23/2022    BUN 18 12/23/2022    CREATININE 1.7 (H) 12/23/2022    EGFRNONAA >60.0 06/22/2022    CALCIUM 9.1 12/23/2022    PHOS 3.4 12/23/2022    MG 1.5 (L) 01/27/2022    ALBUMIN 3.4 (L) 12/23/2022    AST 20 06/22/2022    ALT 25 06/22/2022    UTPCR Unable to calculate 01/24/2022    .5 (H) 12/24/2021    TACROLIMUS 7.1 01/27/2022       Lab Results   Component Value Date    EXTWBC 9.6 12/16/2022    EXTSEGS 50 12/16/2022    EXTPLATELETS 283 12/16/2022    EXTHEMOGLOBI 14.7 12/16/2022    EXTHEMATOCRI 48.1 12/16/2022    EXTCREATININ 1.74 12/16/2022    EXTCREATININ 141.8 12/16/2022    EXTSODIUM 140 12/16/2022    EXTPOTASSIUM 3.8 12/16/2022    EXTBUN 18 12/16/2022    EXTCO2 20 12/16/2022    EXTCALCIUM 9.2 12/16/2022    EXTPHOSPHORU 3.5 12/16/2022    EXTGLUCOSE 101 12/16/2022    EXTALBUMIN 3.9 12/16/2022    EXTAST 22 11/22/2022    EXTALT 38 11/22/2022    EXTBILITOTAL 0.7 11/22/2022       Lab Results   Component Value Date    EXTTACROLVL 8.4 12/16/2022    EXTPROTCRE 0.31 12/16/2022    EXTPTHINTACT 171.1 (A) 03/23/2022    EXTPROTEINUA TRACE 12/16/2022    EXTWBCUA 0-2 12/16/2022    EXTRBCUA NONE 12/16/2022       Labs were reviewed with the patient    Assessment:     1. S/P kidney transplant    2. Long-term use of immunosuppressant medication    3. Anemia of chronic renal failure, stage 2 (mild)    4. Diabetic nephropathy associated with type 2 diabetes mellitus    5. Stage 3a chronic kidney disease        Plan:   Case discussed with Dr Cordon:   Scr elevated over past few week , ~ 1.7 from 1.0-1.3   -Diarrhea --scheduled  IVF , 2 L NS     AM  labs: renal fx panel, DSAs and allosure       Ordered--renal bx with a kidney US  Pt will need to hold xarelto 5-6 days prior to biopsy and the exact date to start holding blood thinner will need to be communicated with the patent once we have a date.     Stool studies (-)   5/26/22 (CE) Cdiff (-), WBC rare, pancreatic elastase fecal 138  Recently saw GI and had a colonoscopy      diarrhea --MMF Changed to Myf 720 mg BID  Ok to take imodium   IVF scheduled locally  Kidney bx and US pending   DSAs and allosure pending   -encouraged to reestablish with en nephrologist     Follow-up:   1. CKD stage: 2    2. Immunosuppression: Prograf trough 8.4, which is supra  therapeutic (target 4-7).   Prograf 4/4, changed to MyF 720 mg BID, and Prednisone  5 mg qd. Will continue to monitor for drug toxicities      3. Allograft Function:  Continue good po hydration.  baseline   creatinine was 1~ 1.3 until 10/2022 and scr has been ~ 1.7.  Add allosure and DSAs  Kidney US  Lab Results   Component Value Date    CREATININE 1.7 (H) 12/23/2022 12/23/2022  11mo 4wk   eGFR >60 mL/min/1.73 m^2 46.2 (A)        4. Hypertension management: advise low salt diet and home BP monitoring    nifedipine 30 mg BID, valsartan     5. Metabolic Bone Disease/Secondary Hyperparathyroidism:stable  Will monitor PTH, CA and Vit D/guidelines  D3 2000 units QD,  mg BID  Lab Results   Component Value Date    .5 (H) 12/24/2021    CALCIUM 9.1 12/23/2022    PHOS 3.4 12/23/2022          6. Electrolytes:  Will monitor /guidelines   Lab Results   Component Value Date     12/23/2022    K 4.5 12/23/2022     12/23/2022    CO2 20 (L) 12/23/2022         7. Anemia: stable. No need for intervention   12/19/22       8.  Cytopenias: no significant cytopenias will monitor as per our guidelines. Medicine list reviewed including potential causes of drug-induced cytopenias    9.Proteinuria: continue p/c ratio as per guidelines        9/14/2022  8mo 2wk   EXT PROT/CREAT Ratio UR 0.23 (P)       10. BK virus infection screening:  will continue to monitor/ guidelines       9/14/2022  8mo 2wk   EXT BK Virus DNA QN PCR NEGATIVE       11. Weight education: provided during the clinic visit   Body mass index is 28.2 kg/m².      12.Patient safety education regarding immunosuppression including prophylaxis posttransplant for CMV, PCP : Education provided about vaccination and prevention of infections     PCP PROPHYLAXIS: Dapsone until 6/25/22 (bactrim DC due to elevated ALT)   CMV PROPHYLAXIS: Valganciclovir until 3/29/22   FUNGAL PROPHYLAXIS: Posaconazole until 1/25/22-completed       Follow-up:   Clinic: return to transplant clinic weekly for the first month after transplant; every 2 weeks during months 2-3; then at 6-, 9-, 12-, 18-, 24-, and 36- months post-transplant to reassess for complications from immunosuppression toxicity and monitor for rejection.  Annually thereafter.    Labs: since patient remains at high risk for rejection and drug-related complications that warrant close monitoring, labs will be ordered as follows: continue twice weekly CBC, renal panel, and drug level for first month; then same labs once weekly through 3rd month post-transplant.  Urine for UA and protein/creatinine ratio monthly.  Serum BK - PCR at 1-, 3-, 6-, 9-, 12-, 18-, 24-, 36-, 48-, and 60 months post-transplant.  Hepatic panel at 1-, 2-, 3-, 6-, 9-, 12-, 18-, 24-, and 36- months post-transplant.    Zara Quiroga NP       Education:   Material provided to the patient.  Patient reminded to call with any health changes since these can be early signs of significant complications.  Also, I advised the patient to be sure any new medications or changes of old medications are discussed with either a pharmacist or physician knowledgeable with transplant to avoid rejection/drug toxicity related to significant drug interactions.    Patient advised that it is recommended that all transplanted patients, and their close contacts and household members receive Covid vaccination.

## 2022-12-22 ENCOUNTER — PATIENT MESSAGE (OUTPATIENT)
Dept: TRANSPLANT | Facility: CLINIC | Age: 57
End: 2022-12-22
Payer: MEDICAID

## 2022-12-22 DIAGNOSIS — Z94.0 S/P KIDNEY TRANSPLANT: Primary | ICD-10-CM

## 2022-12-23 ENCOUNTER — OFFICE VISIT (OUTPATIENT)
Dept: TRANSPLANT | Facility: CLINIC | Age: 57
End: 2022-12-23
Payer: MEDICARE

## 2022-12-23 ENCOUNTER — HOSPITAL ENCOUNTER (OUTPATIENT)
Dept: RADIOLOGY | Facility: HOSPITAL | Age: 57
Discharge: HOME | End: 2022-12-23
Attending: NURSE PRACTITIONER
Payer: MEDICARE

## 2022-12-23 VITALS
BODY MASS INDEX: 28.16 KG/M2 | SYSTOLIC BLOOD PRESSURE: 171 MMHG | RESPIRATION RATE: 16 BRPM | WEIGHT: 207.88 LBS | TEMPERATURE: 97 F | HEIGHT: 72 IN | DIASTOLIC BLOOD PRESSURE: 86 MMHG | OXYGEN SATURATION: 100 % | HEART RATE: 62 BPM

## 2022-12-23 DIAGNOSIS — Z79.60 LONG-TERM USE OF IMMUNOSUPPRESSANT MEDICATION: ICD-10-CM

## 2022-12-23 DIAGNOSIS — D63.1 ANEMIA OF CHRONIC RENAL FAILURE, STAGE 2 (MILD): Chronic | ICD-10-CM

## 2022-12-23 DIAGNOSIS — Z94.0 S/P KIDNEY TRANSPLANT: Primary | ICD-10-CM

## 2022-12-23 DIAGNOSIS — N18.31 STAGE 3A CHRONIC KIDNEY DISEASE: ICD-10-CM

## 2022-12-23 DIAGNOSIS — E11.21 DIABETIC NEPHROPATHY ASSOCIATED WITH TYPE 2 DIABETES MELLITUS: ICD-10-CM

## 2022-12-23 DIAGNOSIS — N18.2 ANEMIA OF CHRONIC RENAL FAILURE, STAGE 2 (MILD): Chronic | ICD-10-CM

## 2022-12-23 DIAGNOSIS — Z94.0 S/P KIDNEY TRANSPLANT: ICD-10-CM

## 2022-12-23 PROCEDURE — 76776 US EXAM K TRANSPL W/DOPPLER: CPT | Mod: TC

## 2022-12-23 PROCEDURE — 99999 PR PBB SHADOW E&M-EST. PATIENT-LVL V: ICD-10-PCS | Mod: PBBFAC,,, | Performed by: NURSE PRACTITIONER

## 2022-12-23 PROCEDURE — 99215 OFFICE O/P EST HI 40 MIN: CPT | Mod: S$PBB,,, | Performed by: NURSE PRACTITIONER

## 2022-12-23 PROCEDURE — 76776 US EXAM K TRANSPL W/DOPPLER: CPT | Mod: 26,,, | Performed by: RADIOLOGY

## 2022-12-23 PROCEDURE — 99999 PR PBB SHADOW E&M-EST. PATIENT-LVL V: CPT | Mod: PBBFAC,,, | Performed by: NURSE PRACTITIONER

## 2022-12-23 PROCEDURE — 99215 PR OFFICE/OUTPT VISIT, EST, LEVL V, 40-54 MIN: ICD-10-PCS | Mod: S$PBB,,, | Performed by: NURSE PRACTITIONER

## 2022-12-23 PROCEDURE — 76776 US TRANSPLANT KIDNEY WITH DOPPLER: ICD-10-PCS | Mod: 26,,, | Performed by: RADIOLOGY

## 2022-12-23 PROCEDURE — 99215 OFFICE O/P EST HI 40 MIN: CPT | Mod: PBBFAC,25 | Performed by: NURSE PRACTITIONER

## 2022-12-23 RX ORDER — MYCOPHENOLIC ACID 180 MG/1
720 TABLET, DELAYED RELEASE ORAL 2 TIMES DAILY
Qty: 240 TABLET | Refills: 11 | Status: SHIPPED | OUTPATIENT
Start: 2022-12-23 | End: 2023-12-05 | Stop reason: SDUPTHER

## 2022-12-23 RX ORDER — METRONIDAZOLE 10 MG/G
GEL TOPICAL DAILY PRN
COMMUNITY
Start: 2022-11-09

## 2022-12-23 RX ORDER — AZELASTINE HYDROCHLORIDE 0.5 MG/ML
1 SOLUTION/ DROPS OPHTHALMIC 2 TIMES DAILY
COMMUNITY
Start: 2022-10-03

## 2022-12-23 RX ORDER — BENZOYL PEROXIDE 100 MG/ML
LIQUID TOPICAL
COMMUNITY
Start: 2022-11-09

## 2022-12-23 RX ORDER — KETOCONAZOLE 20 MG/ML
SHAMPOO, SUSPENSION TOPICAL
COMMUNITY
Start: 2022-11-09

## 2022-12-23 NOTE — LETTER
December 23, 2022        Héctor Calvillo  [address]  [facility]  [place] [address]  Phone: [phone]  Fax: [phone]             [facility] Transplant 1st Fl  [address]  [facility] [address]  Phone: [phone]   Patient: Antwon Valdez   MR Number: [id number]   YOB: 1965   Date of Visit: 12/23/2022       Dear Dr. Héctor Calvillo    Thank you for referring Antwon Valdez to me for evaluation. Attached you will find relevant portions of my assessment and plan of care.    If you have questions, please do not hesitate to call me. I look forward to following Antwon Valdez along with you.    Sincerely,    Zara Quiroga, NP    Enclosure    If you would like to receive this communication electronically, please contact [email or web address] or [phone] to request [facility] [facility] Link access.    [employer] [facility] is a tool which provides read-only access to select patient information with whom you have a relationship. Its easy to use and provides real time access to review your patients record including encounter summaries, notes, results, and demographic information.    If you feel you have received this communication in error or would no longer like to receive these types of communications, please e-mail [email or web address]

## 2022-12-27 DIAGNOSIS — Z94.0 S/P KIDNEY TRANSPLANT: Primary | ICD-10-CM

## 2022-12-28 ENCOUNTER — TELEPHONE (OUTPATIENT)
Dept: TRANSPLANT | Facility: CLINIC | Age: 57
End: 2022-12-28
Payer: MEDICAID

## 2022-12-28 NOTE — TELEPHONE ENCOUNTER
Scr elevated over past few week , ~ 1.7 from 1.0-1.3  Pt has also been having intermittent loose stools/diarrhea  Recent GI wkup and cx (-)  Last seen in clinic on 12/23  MMF changed to MyF  DSAs (-)  Allosure 0.16%  Kidney bx pending   Discussed today with Dr Medinaeld  Add EBV and Quant gold to next lab draw to assess if he qualifies for Belatacept infusion in  place of prograf

## 2023-01-03 ENCOUNTER — TELEPHONE (OUTPATIENT)
Dept: INTERVENTIONAL RADIOLOGY/VASCULAR | Facility: CLINIC | Age: 58
End: 2023-01-03
Payer: MEDICARE

## 2023-01-03 RX ORDER — PREDNISONE 5 MG/1
5 TABLET ORAL DAILY
Qty: 93 TABLET | Refills: 3 | Status: SHIPPED | OUTPATIENT
Start: 2023-01-03 | End: 2024-01-02 | Stop reason: SDUPTHER

## 2023-01-06 ENCOUNTER — TELEPHONE (OUTPATIENT)
Dept: INTERVENTIONAL RADIOLOGY/VASCULAR | Facility: CLINIC | Age: 58
End: 2023-01-06
Payer: MEDICARE

## 2023-01-06 NOTE — TELEPHONE ENCOUNTER
Left message for pt to return my call. Changed the arrival time. Please forward call to [phone]. Thanks

## 2023-01-09 DIAGNOSIS — Z94.0 S/P KIDNEY TRANSPLANT: Primary | ICD-10-CM

## 2023-01-12 ENCOUNTER — DOCUMENTATION ONLY (OUTPATIENT)
Dept: PREADMISSION TESTING | Facility: HOSPITAL | Age: 58
End: 2023-01-12
Payer: MEDICARE

## 2023-01-12 NOTE — PRE-PROCEDURE INSTRUCTIONS
PRE-OP INSTRUCTIONS:  Instructed patient to have no food,milk or milk products after midnight   It is ok to have clear liquids up until 2 hours before the procedure  Medication instructions for pm prior to and am of surgery reviewed.  Instructed patient to avoid taking vitamins,supplements,aspirin or ibuprofen the am of surgery.  Shower instructions provided    Patient denies any side effects or issues with anesthesia or sedation other than PONV    S/P KIDNEY TRANSPLANT

## 2023-01-13 ENCOUNTER — ANESTHESIA (OUTPATIENT)
Dept: INTERVENTIONAL RADIOLOGY/VASCULAR | Facility: HOSPITAL | Age: 58
End: 2023-01-13
Payer: MEDICARE

## 2023-01-13 ENCOUNTER — HOSPITAL ENCOUNTER (OUTPATIENT)
Dept: INTERVENTIONAL RADIOLOGY/VASCULAR | Facility: HOSPITAL | Age: 58
Discharge: HOME | End: 2023-01-13
Attending: INTERNAL MEDICINE
Payer: MEDICARE

## 2023-01-13 VITALS
OXYGEN SATURATION: 98 % | DIASTOLIC BLOOD PRESSURE: 75 MMHG | SYSTOLIC BLOOD PRESSURE: 159 MMHG | TEMPERATURE: 98 F | HEART RATE: 73 BPM | RESPIRATION RATE: 16 BRPM

## 2023-01-13 DIAGNOSIS — Z94.0 S/P KIDNEY TRANSPLANT: ICD-10-CM

## 2023-01-13 LAB
POCT GLUCOSE: 111 MG/DL (ref 70–110)
POCT GLUCOSE: 132 MG/DL (ref 70–110)

## 2023-01-13 PROCEDURE — D9220A PRA ANESTHESIA: Mod: ANES,,, | Performed by: ANESTHESIOLOGY

## 2023-01-13 PROCEDURE — A4550 SURGICAL TRAYS: HCPCS

## 2023-01-13 PROCEDURE — 25000003 PHARM REV CODE 250: Performed by: RADIOLOGY

## 2023-01-13 PROCEDURE — 37000009 HC ANESTHESIA EA ADD 15 MINS

## 2023-01-13 PROCEDURE — 37000008 HC ANESTHESIA 1ST 15 MINUTES

## 2023-01-13 PROCEDURE — 50200 RENAL BIOPSY PERQ: CPT | Performed by: RADIOLOGY

## 2023-01-13 PROCEDURE — 76942 ECHO GUIDE FOR BIOPSY: CPT | Mod: TC | Performed by: RADIOLOGY

## 2023-01-13 PROCEDURE — 25000003 PHARM REV CODE 250: Performed by: STUDENT IN AN ORGANIZED HEALTH CARE EDUCATION/TRAINING PROGRAM

## 2023-01-13 PROCEDURE — 76942 ECHO GUIDE FOR BIOPSY: CPT | Mod: 26,,, | Performed by: RADIOLOGY

## 2023-01-13 PROCEDURE — D9220A PRA ANESTHESIA: ICD-10-PCS | Mod: ANES,,, | Performed by: ANESTHESIOLOGY

## 2023-01-13 PROCEDURE — 25000003 PHARM REV CODE 250

## 2023-01-13 PROCEDURE — D9220A PRA ANESTHESIA: ICD-10-PCS | Mod: CRNA,,, | Performed by: NURSE ANESTHETIST, CERTIFIED REGISTERED

## 2023-01-13 PROCEDURE — 50200 IR BIOPSY KIDNEY: ICD-10-PCS | Mod: RT,,, | Performed by: RADIOLOGY

## 2023-01-13 PROCEDURE — 76942 PR U/S GUIDANCE FOR NEEDLE GUIDANCE: ICD-10-PCS | Mod: 26,,, | Performed by: RADIOLOGY

## 2023-01-13 PROCEDURE — 63600175 PHARM REV CODE 636 W HCPCS: Performed by: STUDENT IN AN ORGANIZED HEALTH CARE EDUCATION/TRAINING PROGRAM

## 2023-01-13 PROCEDURE — D9220A PRA ANESTHESIA: Mod: CRNA,,, | Performed by: NURSE ANESTHETIST, CERTIFIED REGISTERED

## 2023-01-13 PROCEDURE — 82962 GLUCOSE BLOOD TEST: CPT

## 2023-01-13 PROCEDURE — 25000003 PHARM REV CODE 250: Performed by: ANESTHESIOLOGY

## 2023-01-13 RX ORDER — LIDOCAINE HCL/PF 100 MG/5ML
SYRINGE (ML) INTRAVENOUS
Status: DISCONTINUED | OUTPATIENT
Start: 2023-01-13 | End: 2023-01-13

## 2023-01-13 RX ORDER — ACETAMINOPHEN 325 MG/1
650 TABLET ORAL ONCE
Status: COMPLETED | OUTPATIENT
Start: 2023-01-13 | End: 2023-01-13

## 2023-01-13 RX ORDER — OXYCODONE HYDROCHLORIDE 5 MG/1
5 TABLET ORAL EVERY 4 HOURS PRN
Status: DISCONTINUED | OUTPATIENT
Start: 2023-01-13 | End: 2023-01-14 | Stop reason: HOSPADM

## 2023-01-13 RX ORDER — MIDAZOLAM HYDROCHLORIDE 1 MG/ML
INJECTION INTRAMUSCULAR; INTRAVENOUS
Status: DISCONTINUED | OUTPATIENT
Start: 2023-01-13 | End: 2023-01-13

## 2023-01-13 RX ORDER — PROPOFOL 10 MG/ML
VIAL (ML) INTRAVENOUS CONTINUOUS PRN
Status: DISCONTINUED | OUTPATIENT
Start: 2023-01-13 | End: 2023-01-13

## 2023-01-13 RX ORDER — DIPHENHYDRAMINE HYDROCHLORIDE 50 MG/ML
INJECTION INTRAMUSCULAR; INTRAVENOUS
Status: DISCONTINUED | OUTPATIENT
Start: 2023-01-13 | End: 2023-01-13

## 2023-01-13 RX ORDER — SODIUM CHLORIDE 9 MG/ML
75 INJECTION, SOLUTION INTRAVENOUS CONTINUOUS
Status: DISCONTINUED | OUTPATIENT
Start: 2023-01-13 | End: 2023-01-14 | Stop reason: HOSPADM

## 2023-01-13 RX ORDER — LIDOCAINE HYDROCHLORIDE 10 MG/ML
INJECTION INFILTRATION; PERINEURAL
Status: COMPLETED | OUTPATIENT
Start: 2023-01-13 | End: 2023-01-13

## 2023-01-13 RX ORDER — LIDOCAINE HYDROCHLORIDE 10 MG/ML
1 INJECTION, SOLUTION EPIDURAL; INFILTRATION; INTRACAUDAL; PERINEURAL ONCE
Status: DISCONTINUED | OUTPATIENT
Start: 2023-01-13 | End: 2023-01-14 | Stop reason: HOSPADM

## 2023-01-13 RX ORDER — PROPOFOL 10 MG/ML
VIAL (ML) INTRAVENOUS
Status: DISCONTINUED | OUTPATIENT
Start: 2023-01-13 | End: 2023-01-13

## 2023-01-13 RX ORDER — ONDANSETRON 2 MG/ML
4 INJECTION INTRAMUSCULAR; INTRAVENOUS EVERY 6 HOURS PRN
Status: DISCONTINUED | OUTPATIENT
Start: 2023-01-13 | End: 2023-01-14 | Stop reason: HOSPADM

## 2023-01-13 RX ADMIN — MIDAZOLAM HYDROCHLORIDE 2 MG: 1 INJECTION, SOLUTION INTRAMUSCULAR; INTRAVENOUS at 11:01

## 2023-01-13 RX ADMIN — PROPOFOL 30 MG: 10 INJECTION, EMULSION INTRAVENOUS at 11:01

## 2023-01-13 RX ADMIN — Medication 40 MG: at 11:01

## 2023-01-13 RX ADMIN — ACETAMINOPHEN 650 MG: 325 TABLET ORAL at 01:01

## 2023-01-13 RX ADMIN — PROPOFOL 100 MCG/KG/MIN: 10 INJECTION, EMULSION INTRAVENOUS at 11:01

## 2023-01-13 RX ADMIN — SODIUM CHLORIDE: 0.9 INJECTION, SOLUTION INTRAVENOUS at 10:01

## 2023-01-13 RX ADMIN — LIDOCAINE HYDROCHLORIDE 3 ML: 10 INJECTION, SOLUTION INFILTRATION; PERINEURAL at 12:01

## 2023-01-13 RX ADMIN — DIPHENHYDRAMINE HYDROCHLORIDE 12.5 MG: 50 INJECTION, SOLUTION INTRAMUSCULAR; INTRAVENOUS at 11:01

## 2023-01-13 RX ADMIN — GLYCOPYRROLATE 0.2 MG: 0.2 INJECTION, SOLUTION INTRAMUSCULAR; INTRAVENOUS at 11:01

## 2023-01-13 RX ADMIN — GELATIN ABSORBABLE SPONGE 12-7 MM 1 EACH: 12-7 MISC at 12:01

## 2023-01-13 NOTE — ANESTHESIA PREPROCEDURE EVALUATION
[facility]  Anesthesia Pre-Operative Evaluation       Patient Name: Antwon Valdez  YOB: 1965  MRN: [id number]  [facility]: [id number]      Code Status: Prior   Date of Procedure: 1/13/2023  Anesthesia: * No anesthesia type entered * Procedure: IR BIOPSY KIDNEY   Diagnosis: S/P kidney           SUBJECTIVE:   Antwon Valdez is a 57 y.o. male who  has a past medical history of Chronic pulmonary embolism (6/1/2021), Diabetes mellitus, Diabetic nephropathy associated with type 2 diabetes mellitus (6/1/2021), Disorder of kidney and ureter, ESRD (end stage renal disease) (6/1/2021), Essential hypertension (6/1/2021), GERD (gastroesophageal reflux disease), Mixed hyperlipidemia (6/1/2021), and Sleep apnea (6/1/2021). No notes on file    No current facility-administered medications for this visit.       he has a current medication list which includes the following long-term medication(s): azelastine, blood-glucose meter, insulin detemir u-100, ketoconazole, metronidazole 1%, mycophenolate, nifedipine, pantoprazole, sodium bicarbonate, tacrolimus, valsartan, [DISCONTINUED] insulin aspart u-100, and [DISCONTINUED] insulin lispro.   ALLERGIES:     Review of patient's allergies indicates:   Allergen Reactions    Simvastatin Other (See Comments)     cramping     LDA:      Lines/Drains/Airways     Drain  Duration                Open Drain 12/25/21 1525 Lateral Abdomen   383 days              MEDICATIONS:     Antibiotics (From admission, onward)    None        VTE Risk Mitigation (From admission, onward)    None        Current Outpatient Medications   Medication Sig Dispense Refill    azelastine (OPTIVAR) 0.05 % ophthalmic solution Place 1 drop into both eyes 2 (two) times daily.      benzoyl peroxide (BENZAC AC) 10 % external wash wash affected areas once or twice daily      blood sugar diagnostic (ACCU-CHEK GUIDE TEST STRIPS MISC) 1 each by abdominal subcutaneous route 3 (three) times daily.      blood  sugar diagnostic Strp 1 each by Misc.(Non-Drug; Combo Route) route 3 (three) times daily. 100 each 11    blood-glucose meter kit Use as instructed 1 each 0    cholecalciferol, vitamin D3, (VITAMIN D3) 50 mcg (2,000 unit) Tab Take 2,000 Units by mouth once daily.      CREON 36,000-114,000- 180,000 unit CpDR Take by mouth. As directed      cyanocobalamin (VITAMIN B-12) 1000 MCG tablet Take 1,000 mcg by mouth once daily.      insulin detemir U-100 (LEVEMIR FLEXTOUCH) 100 unit/mL (3 mL) SubQ InPn pen Inject 4 Units into the skin every evening.      ketoconazole (NIZORAL) 2 % shampoo Apply topically twice a week.      lancets Misc 1 each by Misc.(Non-Drug; Combo Route) route 3 (three) times daily. 100 each 11    metronidazole 1% (METROGEL) 1 % Gel Apply topically daily as needed.      multivitamin Tab Take 1 tablet by mouth once daily.      mycophenolate (MYFORTIC) 180 MG TbEC Take 4 tablets (720 mg total) by mouth 2 (two) times daily. 240 tablet 11    NIFEdipine (PROCARDIA-XL) 30 MG (OSM) 24 hr tablet Take 1 tablet (30 mg total) by mouth once daily. 30 tablet 11    pantoprazole (PROTONIX) 40 MG tablet Take 1 tablet (40 mg total) by mouth once daily. 30 tablet 11    predniSONE (DELTASONE) 5 MG tablet Take 1 tablet (5 mg total) by mouth once daily. 93 tablet 3    rivaroxaban (XARELTO DVT-PE TREAT 30D START) 15 mg (42)- 20 mg (9) tablet dose pack Take 1 tablet (15 mg) by mouth twice daily with food for 21 days followed by 1 tablet (20 mg) by mouth once daily with food      sodium bicarbonate 650 MG tablet Take 2 tablets (1,300 mg total) by mouth 2 (two) times daily. 120 tablet 11    tacrolimus (PROGRAF) 1 MG Cap Take 4 capsules (4 mg total) by mouth every morning AND 4 capsules (4 mg total) every evening. 720 capsule 3    valsartan (DIOVAN) 80 MG tablet Take 1 tablet (80 mg total) by mouth once daily. (Patient taking differently: Take 80 mg by mouth every evening.) 90 tablet 3     No current  facility-administered medications for this visit.          History:   There are no hospital problems to display for this patient.    Surgical History:    has a past surgical history that includes Gastric bypass; Cholecystectomy; Colonoscopy; Hernia repair; Cataract extraction; Cardiac catheterization; Removal of catheter; Colonoscopy; Dialysis fistula creation; Gastric bypass; Hardware Removal; Knee surgery; Paracentesis; Peritoneal catheter insertion; Kidney transplant (N/A, 12/25/2021); Peritoneal catheter removal (N/A, 12/25/2021); and Cystoscopy.   Social History:    reports being sexually active and has had partner(s) who are female.  reports that he has never smoked. He has never used smokeless tobacco. He reports that he does not drink alcohol and does not use drugs.     OBJECTIVE:     Vital Signs (Most Recent):    Vital Signs Range (Last 24H):          There is no height or weight on file to calculate BMI.   Wt Readings from Last 4 Encounters:   12/23/22 94.3 kg (207 lb 14.3 oz)   09/21/22 86.6 kg (190 lb 14.7 oz)   09/21/22 86.6 kg (190 lb 14.7 oz)   07/08/22 88.7 kg (195 lb 8.8 oz)     Significant Labs:  Lab Results   Component Value Date    WBC 6.12 06/22/2022    HGB 13.5 (L) 06/22/2022    HCT 44.3 06/22/2022     06/22/2022     12/23/2022    K 4.5 12/23/2022     12/23/2022    CREATININE 1.7 (H) 12/23/2022    BUN 18 12/23/2022    CO2 20 (L) 12/23/2022    GLU 98 12/23/2022    CALCIUM 9.1 12/23/2022    MG 1.5 (L) 01/27/2022    PHOS 3.4 12/23/2022    ALKPHOS 146 (H) 06/22/2022    ALT 25 06/22/2022    AST 20 06/22/2022    ALBUMIN 3.4 (L) 12/23/2022    INR 1.1 06/22/2022    APTT 23.4 12/24/2021    HGBA1C 5.9 (H) 12/25/2021    CPK 20 06/22/2022     No LMP for male patient.  No results found for this or any previous visit (from the past 72 hour(s)).    EKG:   Results for orders placed or performed during the hospital encounter of 12/24/21   EKG 12-lead    Collection Time: 12/24/21  5:26 PM     Narrative    Test Reason : Z01.818,    Vent. Rate : 071 BPM     Atrial Rate : 071 BPM     P-R Int : 160 ms          QRS Dur : 076 ms      QT Int : 400 ms       P-R-T Axes : 000 020 043 degrees     QTc Int : 434 ms    Normal sinus rhythm  Normal ECG  No previous ECGs available  Confirmed by BRANDYN GOLDBERG MD ([id number]) on 12/25/2021 10:43:25 AM    Referred By: MONSE JOHNSON           Confirmed By:BRANDYN GOLDBERG MD       TTE:  No results found for this or any previous visit.  No results found for: EF   No results found for this or any previous visit.  HANSA:  No results found for this or any previous visit.  Stress Test:  No results found for this or any previous visit.     LHC:  No results found for this or any previous visit.     PFT:  No results found for: FEV1, FVC, EAX9PQQ, TLC, DLCO   ASSESSMENT/PLAN:         Pre-op Assessment    I have reviewed the Patient Summary Reports.     I have reviewed the Nursing Notes.    I have reviewed the Medications.     Review of Systems  Anesthesia Hx:  No problems with previous Anesthesia    Hematology/Oncology:  Hematology Normal   Oncology Normal     EENT/Dental:EENT/Dental Normal   Cardiovascular:   Exercise tolerance: good Hypertension    Pulmonary:   Sleep Apnea    Renal/:   Chronic Renal Disease    Hepatic/GI:   GERD    Musculoskeletal:  Musculoskeletal Normal    Neurological:  Neurology Normal    Endocrine:   Diabetes    Dermatological:  Skin Normal    Psych:  Psychiatric Normal           Physical Exam  General: Well nourished    Airway:  Mallampati: III / II  Mouth Opening: Normal  TM Distance: Normal  Tongue: Normal  Neck ROM: Normal ROM    Dental:  Intact        Anesthesia Plan  Type of Anesthesia, risks & benefits discussed:    Anesthesia Type: Gen ETT, Gen Natural Airway  Intra-op Monitoring Plan: Standard ASA Monitors  Post Op Pain Control Plan: multimodal analgesia and IV/PO Opioids PRN  Induction:  IV  Airway Plan: Direct and Video, Post-Induction  Informed Consent:  Informed consent signed with the Patient and all parties understand the risks and agree with anesthesia plan.  All questions answered.   ASA Score: 3  Day of Surgery Review of History & Physical: H&P completed by Anesthesiologist.  Anesthesia Plan Notes: Chart reviewed, patient interviewed and examined.  The anesthetic plan was explained.  Risks, benefits, and alternatives were discussed. Questions were answered and the consent was signed.        CAREN Curry M.D.         Ready For Surgery From Anesthesia Perspective.     .

## 2023-01-13 NOTE — DISCHARGE INSTRUCTIONS
Please call with any questions or concerns.      Monday thru Friday 8:00 am - 4:30 pm    Interventional Radiology   [phone]    After Hours    Ask for the Radiology Intern on call  [phone]

## 2023-01-13 NOTE — PLAN OF CARE
Kidney biopsy completed. Patient tolerated well; monitoring maintained per CRNA. Site CDI. Specimen transported to pathology. Patient to be transported to PACU accompanied by this RN and CRNA; report to be given at the bedside.

## 2023-01-13 NOTE — TRANSFER OF CARE
Anesthesia Transfer of Care Note    Patient: Antwon Valdez    Procedure(s) Performed: * No procedures listed *    Patient location: PACU    Anesthesia Type: general    Transport from OR: Transported from OR on room air with adequate spontaneous ventilation. Transported from OR on 6-10 L/min O2 by face mask with adequate spontaneous ventilation    Post pain: adequate analgesia    Post assessment: no apparent anesthetic complications and tolerated procedure well    Post vital signs: stable    Level of consciousness: awake, alert and oriented    Nausea/Vomiting: no nausea/vomiting    Complications: none    Transfer of care protocol was followed      Last vitals: 01/13/23 1224  Visit Vitals  BP (!) 162/77   Pulse 64   Temp 36.7 °C (98 °F) (Temporal)   Resp 16   SpO2 100%

## 2023-01-13 NOTE — DISCHARGE SUMMARY
Radiology Discharge Summary      Hospital Course: No complications    Admit Date: 1/13/2023  Discharge Date: 01/13/2023     Instructions Given to Patient: Yes  Diet: Resume prior diet  Activity: activity as tolerated and no driving for today    Description of Condition on Discharge: Stable  Vital Signs (Most Recent): Temp: 98.7 °F (37.1 °C) (01/13/23 0940)  Pulse: 70 (01/13/23 0940)  Resp: 16 (01/13/23 0940)  BP: (!) 167/77 (01/13/23 0940)  SpO2: 99 % (01/13/23 0940)    Discharge Disposition: Home    Discharge Diagnosis: MEAGAN s/p renal transplant biopsy    Follow up: As scheduled    Bruce Machado M.D.  Interventional Radiology  Department of Radiology  Pager: [phone]

## 2023-01-13 NOTE — TELEPHONE ENCOUNTER
Message sent to patient. Tacro dose to 4/3. Labs to be scheduled.----- Message from Guerline Goel MD sent at 1/13/2023  1:24 PM CST -----  Lower tacro to 4/3. Repeat level in 2 weeks  Labs and clinic with nephrology abt every 3-4 mos; will defer to nephrologist CKD care

## 2023-01-13 NOTE — H&P
Radiology History & Physical      SUBJECTIVE:     Chief Complaint: transplant kidney biopsy    History of Present Illness:  Antwon Valdez is a 57 y.o. male with kidney transplant on 12/25/2021. His creatinine has been rising. He presents for a transplant kidney biopsy for further workup.    Past Medical History:   Diagnosis Date    Chronic pulmonary embolism 6/1/2021    Diabetes mellitus     Diabetic nephropathy associated with type 2 diabetes mellitus 6/1/2021    Disorder of kidney and ureter     ESRD (end stage renal disease) 6/1/2021    Essential hypertension 6/1/2021    GERD (gastroesophageal reflux disease)     Mixed hyperlipidemia 6/1/2021    Sleep apnea 6/1/2021     Past Surgical History:   Procedure Laterality Date    CARDIAC CATHETERIZATION      CATARACT EXTRACTION      CHOLECYSTECTOMY      COLONOSCOPY      COLONOSCOPY      CYSTOSCOPY      stent removal    DIALYSIS FISTULA CREATION      GASTRIC BYPASS      GASTRIC BYPASS      HARDWARE REMOVAL      HERNIA REPAIR      KIDNEY TRANSPLANT N/A 12/25/2021    Procedure: TRANSPLANT, KIDNEY;  Surgeon: Clayton Terrazas MD;  Location: [facility] OR [address] [place] [address];  Service: Transplant;  Laterality: N/A;    KNEE SURGERY      PARACENTESIS      PERITONEAL CATHETER INSERTION      PERITONEAL CATHETER REMOVAL N/A 12/25/2021    Procedure: REMOVAL, CATHETER, DIALYSIS, PERITONEAL;  Surgeon: Clayton Terrazas MD;  Location: [facility] OR [address] [place] [address];  Service: Transplant;  Laterality: N/A;    REMOVAL OF CATHETER         Home Meds:   Prior to Admission medications    Medication Sig Start Date End Date Taking? Authorizing Provider   azelastine (OPTIVAR) 0.05 % ophthalmic solution Place 1 drop into both eyes 2 (two) times daily. 10/3/22   Historical Provider   benzoyl peroxide (BENZAC AC) 10 % external wash wash affected areas once or twice daily 11/9/22   Historical Provider   blood sugar diagnostic (ACCU-CHEK GUIDE TEST STRIPS MISC) 1 each by abdominal subcutaneous route 3 (three) times daily. 12/28/21    Historical Provider   blood sugar diagnostic Strp 1 each by Misc.(Non-Drug; Combo Route) route 3 (three) times daily. 12/28/21   Guerline Goel MD   blood-glucose meter kit Use as instructed 12/28/21   Guerline Goel MD   cholecalciferol, vitamin D3, (VITAMIN D3) 50 mcg (2,000 unit) Tab Take 2,000 Units by mouth once daily.    Historical Provider   CREON 36,000-114,000- 180,000 unit CpDR Take by mouth. As directed 6/2/22   Historical Provider   cyanocobalamin (VITAMIN B-12) 1000 MCG tablet Take 1,000 mcg by mouth once daily.    Historical Provider   insulin detemir U-100 (LEVEMIR FLEXTOUCH) 100 unit/mL (3 mL) SubQ InPn pen Inject 4 Units into the skin every evening. 8/15/22   Historical Provider   ketoconazole (NIZORAL) 2 % shampoo Apply topically twice a week. 11/9/22   Historical Provider   lancets Misc 1 each by Misc.(Non-Drug; Combo Route) route 3 (three) times daily. 12/28/21   Guerline Goel MD   metronidazole 1% (METROGEL) 1 % Gel Apply topically daily as needed. 11/9/22   Historical Provider   multivitamin Tab Take 1 tablet by mouth once daily. 12/29/21   Guerline Goel MD   mycophenolate (MYFORTIC) 180 MG TbEC Take 4 tablets (720 mg total) by mouth 2 (two) times daily. 12/23/22 12/23/23  Zara Quiroga, PABLITO   NIFEdipine (PROCARDIA-XL) 30 MG (OSM) 24 hr tablet Take 1 tablet (30 mg total) by mouth once daily. 9/21/22 9/21/23  Guerline Goel MD   pantoprazole (PROTONIX) 40 MG tablet Take 1 tablet (40 mg total) by mouth once daily. 1/7/22 1/12/23  Marva Juan NP   predniSONE (DELTASONE) 5 MG tablet Take 1 tablet (5 mg total) by mouth once daily. 1/3/23   Guerline Goel MD   rivaroxaban (XARELTO DVT-PE TREAT 30D START) 15 mg (42)- 20 mg (9) tablet dose pack Take 1 tablet (15 mg) by mouth twice daily with food for 21 days followed by 1 tablet (20 mg) by mouth once daily with food 4/7/22   Guerline Goel MD   sodium bicarbonate 650 MG  tablet Take 2 tablets (1,300 mg total) by mouth 2 (two) times daily. 11/25/22 11/25/23  Hoda Dumas MD   tacrolimus (PROGRAF) 1 MG Cap Take 4 capsules (4 mg total) by mouth every morning AND 4 capsules (4 mg total) every evening. 9/21/22 9/21/23  Guerline Goel MD   valsartan (DIOVAN) 80 MG tablet Take 1 tablet (80 mg total) by mouth once daily.  Patient taking differently: Take 80 mg by mouth every evening. 9/27/22 9/27/23  Guerline Goel MD   insulin aspart U-100 (NOVOLOG) 100 unit/mL injection Inject 1-2 Units into the skin as needed for High Blood Sugar.  12/28/21  Historical Provider   insulin lispro (HUMALOG KWIKPEN INSULIN) 100 unit/mL pen Inject 6 Units into the skin 3 (three) times daily with meals. Plus SSI: 180 - 230 + 2 unit; 231- 280  + 4 units; 281 - 330 + 6 units; 331 - 380 + 8 units; > 380   + 10 units. Max TDD 48 units.  Patient taking differently: Inject 12 Units into the skin 3 (three) times daily with meals. Plus SSI: 180 - 230 + 2 unit; 231- 280  + 4 units; 281 - 330 + 6 units; 331 - 380 + 8 units; > 380   + 10 units. Max TDD 48 units. 1/10/22 9/1/22  Lionel Leon, SOPHIE, FNP     Anticoagulants/Antiplatelets:  rivaroxaban last taken 01/11/2023 morning    Allergies:   Review of patient's allergies indicates:   Allergen Reactions    Simvastatin Other (See Comments)     cramping     Sedation History:  no adverse reactions    Review of Systems:   Hematological: no known coagulopathies  Respiratory: no shortness of breath  Cardiovascular: no chest pain  Gastrointestinal: no abdominal pain  Genito-Urinary: no dysuria  Musculoskeletal: negative  Neurological: no TIA or stroke symptoms         OBJECTIVE:     Vital Signs (Most Recent)  Temp: 98.7 °F (37.1 °C) (01/13/23 0940)  Pulse: 70 (01/13/23 0940)  Resp: 16 (01/13/23 0940)  BP: (!) 167/77 (01/13/23 0940)  SpO2: 99 % (01/13/23 0940)      Physical Exam:  ASA: 3  Mallampati: 3    General: no acute distress  Mental Status:  alert and oriented to person, place and time  HEENT: normocephalic, atraumatic  Chest: unlabored breathing  Heart: regular heart rate  Abdomen: nondistended  Extremity: moves all extremities    Laboratory  Lab Results   Component Value Date    INR 1.0 01/13/2023       Lab Results   Component Value Date    WBC 10.92 01/13/2023    HGB 13.6 (L) 01/13/2023    HCT 43.1 01/13/2023    MCV 89 01/13/2023     01/13/2023      Lab Results   Component Value Date    GLU 98 12/23/2022     12/23/2022    K 4.5 12/23/2022     12/23/2022    CO2 20 (L) 12/23/2022    BUN 18 12/23/2022    CREATININE 1.7 (H) 12/23/2022    CALCIUM 9.1 12/23/2022    MG 1.5 (L) 01/27/2022    ALT 25 06/22/2022    AST 20 06/22/2022    ALBUMIN 3.4 (L) 12/23/2022    BILITOT 0.7 06/22/2022    BILIDIR 0.3 01/27/2022       ASSESSMENT/PLAN:     Sedation Plan: per anesthesia  Patient will undergo ultrasound guided transplant kidney biopsy.    Written consent was obtained from the patient. All questions answered.    Igor Sosa MD  Radiology PGY-3  [facility]

## 2023-01-13 NOTE — PROCEDURES
Radiology Post-Procedure Note    Pre Op Diagnosis: MEAGAN    Post Op Diagnosis: Same    Procedure: Renal transplant biopsy    Procedure performed by: Bruce Machado MD    Written Informed Consent Obtained: Yes  Specimen Removed: YES 4 x 18 gauge cores  Estimated Blood Loss: Minimal    Findings:   Under US guidance, 17/18 gauge biopsy system was used to sample RLQ renal transplant. Tract embolized with gelfoam slurry. No complications. See dictation.    Patient tolerated procedure well.    Bruce Machado M.D.  Interventional Radiology  Department of Radiology  Pager: [phone]

## 2023-01-17 ENCOUNTER — DOCUMENTATION ONLY (OUTPATIENT)
Dept: TRANSPLANT | Facility: CLINIC | Age: 58
End: 2023-01-17
Payer: MEDICARE

## 2023-01-17 ENCOUNTER — PATIENT MESSAGE (OUTPATIENT)
Dept: TRANSPLANT | Facility: CLINIC | Age: 58
End: 2023-01-17
Payer: MEDICARE

## 2023-01-17 NOTE — LETTER
To: Julia Candelaria M.D.  [facility]  Director, Division of Renal/Electron Microscopy Pathology Laboratory  [address] [address]  Phone: [phone]    Fax: [phone]    Requesting Physician: Guerline Navarro M.D.  NPI #: [id number] E-mail: [email or web address] Phone: [phone]     Fax: [phone] Pager/Cell: [phone]    Facility Name: [facility]  Facility Address: [address] [place] [address]  After 5PM, contact on call Physician: Guerline Salinas MD    Request: Renal Transplant Biopsy Consultation  PLEASE NOTE: If specimen does not arrive for this biopsy consultation request, please contact the [facility] at [phone].    Patient Name: Antwon Valdez  Age: 57 y.o. : 1965 Sex: male   Race: White SSN:  MRN: [id number]   IP/OP: outpatient    Primary Disease: Diabetes Mellitus - Type Other / Unknown  Date of Transplant: 2021  Transplant Biopsy Date/Time: 2023, 12:02 p.m.  Clinical Diagnosis: MEAGAN    Labs:  Creatinine   Date Value Ref Range Status   2023 1.4 0.5 - 1.4 mg/dL Final   2022 1.7 (H) 0.5 - 1.4 mg/dL Final   2022 1.3 0.5 - 1.4 mg/dL Final   2022 1.0 0.5 - 1.4 mg/dL Final   2022 1.3 0.5 - 1.4 mg/dL Final   2022 1.1 0.5 - 1.4 mg/dL Final   2022 1.1 0.5 - 1.4 mg/dL Final   2022 1.2 0.5 - 1.4 mg/dL Final   01/10/2022 1.2 0.5 - 1.4 mg/dL Final   2022 1.4 0.5 - 1.4 mg/dL Final     Prot/Creat Ratio, Urine   Date Value Ref Range Status   2022 Unable to calculate 0.00 - 0.20 Final   2022 0.27 (H) 0.00 - 0.20 Final       Lab Results   Component Value Date/Time    BKVIRUSDNABL Not detected 2023 09:10 AM       Lab Results   Component Value Date/Time    EXTBKVIDANQN NEGATIVE 2022 07:49 AM       Lab Results   Component Value Date/Time    TACROLIMUS 7.7 2023 09:10 AM        Lab Results   Component Value Date/Time    EXTTACROLVL 8.4 12/16/2022 08:04 AM       No results found for: CYCLOSPORINE    No results found for: EXTCYCLOSLVL    No results found for: SIROLIMUSLEV    No results found for: EXTSIROLIMUS    No results found for: EVEROLIMUS    No results found for: EXTEVEROLIMU    Most Recent CNI/Rapa level: see attched    Problem List:  Patient Active Problem List   Diagnosis    Essential hypertension    Sleep apnea    Mixed hyperlipidemia    Diabetic nephropathy associated with type 2 diabetes mellitus    Pulmonary embolus    Type 2 diabetes mellitus with chronic kidney disease, with long-term current use of insulin    Secondary hyperparathyroidism of renal origin    Anemia of chronic renal failure, stage 2 (mild)    S/P kidney transplant    At risk for opportunistic infections    Prophylactic immunotherapy    Adrenal cortical steroids causing adverse effect in therapeutic use    Long-term use of immunosuppressant medication    Elevated serum creatinine    Chronic diarrhea    Secondary polycythemia    CKD (chronic kidney disease)     Medications:  Current Outpatient Medications   Medication Sig    azelastine (OPTIVAR) 0.05 % ophthalmic solution Place 1 drop into both eyes 2 (two) times daily.    benzoyl peroxide (BENZAC AC) 10 % external wash wash affected areas once or twice daily    blood sugar diagnostic (ACCU-CHEK GUIDE TEST STRIPS MISC) 1 each by abdominal subcutaneous route 3 (three) times daily.    blood sugar diagnostic Strp 1 each by Misc.(Non-Drug; Combo Route) route 3 (three) times daily.    blood-glucose meter kit Use as instructed    cholecalciferol, vitamin D3, (VITAMIN D3) 50 mcg (2,000 unit) Tab Take 2,000 Units by mouth once daily.    CREON 36,000-114,000- 180,000 unit CpDR Take by mouth. As directed    cyanocobalamin (VITAMIN B-12) 1000 MCG tablet Take 1,000 mcg by mouth once daily.    insulin detemir U-100 (LEVEMIR FLEXTOUCH) 100  unit/mL (3 mL) SubQ InPn pen Inject 4 Units into the skin every evening.    ketoconazole (NIZORAL) 2 % shampoo Apply topically twice a week.    lancets Misc 1 each by Misc.(Non-Drug; Combo Route) route 3 (three) times daily.    metronidazole 1% (METROGEL) 1 % Gel Apply topically daily as needed.    multivitamin Tab Take 1 tablet by mouth once daily.    mycophenolate (MYFORTIC) 180 MG TbEC Take 4 tablets (720 mg total) by mouth 2 (two) times daily.    NIFEdipine (PROCARDIA-XL) 30 MG (OSM) 24 hr tablet Take 1 tablet (30 mg total) by mouth once daily.    pantoprazole (PROTONIX) 40 MG tablet Take 1 tablet (40 mg total) by mouth once daily.    predniSONE (DELTASONE) 5 MG tablet Take 1 tablet (5 mg total) by mouth once daily.    rivaroxaban (XARELTO DVT-PE TREAT 30D START) 15 mg (42)- 20 mg (9) tablet dose pack Take 1 tablet (15 mg) by mouth twice daily with food for 21 days followed by 1 tablet (20 mg) by mouth once daily with food    sodium bicarbonate 650 MG tablet Take 2 tablets (1,300 mg total) by mouth 2 (two) times daily.    tacrolimus (PROGRAF) 1 MG Cap Take 4 capsules (4 mg total) by mouth every morning AND 4 capsules (4 mg total) every evening.    valsartan (DIOVAN) 80 MG tablet Take 1 tablet (80 mg total) by mouth once daily. (Patient taking differently: Take 80 mg by mouth every evening.)     No current facility-administered medications for this visit.     RN submitting data and to be called if questions regarding this document: Hang Campbell RN, [phone]    Most Recent Note:  See atteched

## 2023-01-17 NOTE — ANESTHESIA POSTPROCEDURE EVALUATION
Anesthesia Post Evaluation    Patient: Antwon Vadlez    Procedure(s) Performed: * No procedures listed *    Final Anesthesia Type: general      Patient location during evaluation: PACU  Patient participation: Yes- Able to Participate  Level of consciousness: awake and alert  Post-procedure vital signs: reviewed and stable  Pain management: adequate  Airway patency: patent    PONV status at discharge: No PONV  Anesthetic complications: no      Cardiovascular status: blood pressure returned to baseline  Respiratory status: unassisted  Hydration status: euvolemic  Follow-up not needed.          Vitals Value Taken Time   /72 01/13/23 1418   Temp 36.7 °C (98 °F) 01/13/23 1335   Pulse 73 01/13/23 1418   Resp 16 01/13/23 1415   SpO2 100 % 01/13/23 1418   Vitals shown include unvalidated device data.      No case tracking events are documented in the log.      Pain/Eben Score: No data recorded

## 2023-01-18 RX ORDER — TACROLIMUS 1 MG/1
CAPSULE ORAL
Qty: 630 CAPSULE | Refills: 3 | Status: SHIPPED | OUTPATIENT
Start: 2023-01-18 | End: 2023-08-08

## 2023-01-19 LAB
FINAL PATHOLOGIC DIAGNOSIS: NORMAL
GROSS: NORMAL
Lab: NORMAL

## 2023-01-30 ENCOUNTER — PATIENT MESSAGE (OUTPATIENT)
Dept: TRANSPLANT | Facility: CLINIC | Age: 58
End: 2023-01-30
Payer: MEDICARE

## 2023-01-31 ENCOUNTER — DOCUMENTATION ONLY (OUTPATIENT)
Dept: TRANSPLANT | Facility: CLINIC | Age: 58
End: 2023-01-31
Payer: MEDICARE

## 2023-01-31 LAB
EXT CHOLESTEROL: 122 (ref 140–199)
EXT EBV IGG: ABNORMAL
EXT HDL: 40 (ref 41–75)
EXT LDL CHOLESTEROL: 59 MG/DL
EXT TACROLIMUS LVL: 6.5
EXT TRIGLYCERIDES: 130 (ref 35–159)
QUANTIFERON GOLD TB: ABNORMAL

## 2023-02-07 ENCOUNTER — TELEPHONE (OUTPATIENT)
Dept: TRANSPLANT | Facility: CLINIC | Age: 58
End: 2023-02-07
Payer: MEDICARE

## 2023-02-07 ENCOUNTER — DOCUMENTATION ONLY (OUTPATIENT)
Dept: TRANSPLANT | Facility: CLINIC | Age: 58
End: 2023-02-07
Payer: MEDICARE

## 2023-02-07 LAB
EXT EBV DNA BY PCR: POSITIVE
QUANTIFERON GOLD TB: NEGATIVE

## 2023-02-07 NOTE — TELEPHONE ENCOUNTER
----- Message from Guerline Goel MD sent at 2/7/2023  1:07 PM CST -----  OK to proceed with jose conversion if pt agreeable

## 2023-02-16 ENCOUNTER — PATIENT MESSAGE (OUTPATIENT)
Dept: TRANSPLANT | Facility: CLINIC | Age: 58
End: 2023-02-16
Payer: MEDICARE

## 2023-02-28 ENCOUNTER — PATIENT MESSAGE (OUTPATIENT)
Dept: TRANSPLANT | Facility: CLINIC | Age: 58
End: 2023-02-28
Payer: MEDICARE

## 2023-03-15 ENCOUNTER — PATIENT MESSAGE (OUTPATIENT)
Dept: TRANSPLANT | Facility: CLINIC | Age: 58
End: 2023-03-15
Payer: MEDICARE

## 2023-03-16 ENCOUNTER — TELEPHONE (OUTPATIENT)
Dept: TRANSPLANT | Facility: CLINIC | Age: 58
End: 2023-03-16
Payer: MEDICARE

## 2023-03-16 NOTE — TELEPHONE ENCOUNTER
Spoke with patient regarding switch to LOIS. Explained to patient that LOIS would replace the Tacro he is currently taking. Also that the EBV lab results were needed when switching to LOIS. Patient was concerned but expressed understanding of process. Will follow up with pharmD on status of infusion center. Will follow up with patient.

## 2023-04-17 ENCOUNTER — PATIENT MESSAGE (OUTPATIENT)
Dept: TRANSPLANT | Facility: CLINIC | Age: 58
End: 2023-04-17
Payer: MEDICARE

## 2023-04-19 ENCOUNTER — DOCUMENTATION ONLY (OUTPATIENT)
Dept: TRANSPLANT | Facility: CLINIC | Age: 58
End: 2023-04-19

## 2023-04-19 ENCOUNTER — TELEPHONE (OUTPATIENT)
Dept: TRANSPLANT | Facility: CLINIC | Age: 58
End: 2023-04-19
Payer: MEDICARE

## 2023-04-19 NOTE — TELEPHONE ENCOUNTER
I spoke to Dr. Héctor Calvillo, who is trying to help patient get IV belatacept at home. He noted Antwon had questions about ASE regarding the med; I called him and explained the rationale (CNI sparing).    PharmD- Please call patient [and confirm he understands the medication.    I reminded him if he has misgivings, his repeat creat  came down from 1.7 to 1.4, so he can choose to stay the course and not change. If he chooses to remain on tacro. He advised me he wanted to proceed.

## 2023-05-08 ENCOUNTER — PATIENT MESSAGE (OUTPATIENT)
Dept: TRANSPLANT | Facility: CLINIC | Age: 58
End: 2023-05-08
Payer: MEDICARE

## 2023-05-10 ENCOUNTER — TELEPHONE (OUTPATIENT)
Dept: TRANSPLANT | Facility: CLINIC | Age: 58
End: 2023-05-10
Payer: MEDICARE

## 2023-05-10 NOTE — TELEPHONE ENCOUNTER
Spoke with Dr Calvillo office. They are requesting LOIS specifics. Will have pharmD send over to office.----- Message from Anastasiya Larsen RN sent at 5/4/2023  4:19 PM CDT -----  Regarding: FW: Infusion inquiry    ----- Message -----  From: Dhiraj Dunbar MA  Sent: 5/4/2023  10:25 AM CDT  To: Brian Uriarte, PharmD, #  Subject: Infusion inquiry                                 Manav from Dr Calvillo office is calling in regards to the patient infusion and needs specifics. Please call and advise.      [phone]

## 2023-05-22 ENCOUNTER — DOCUMENTATION ONLY (OUTPATIENT)
Dept: TRANSPLANT | Facility: CLINIC | Age: 58
End: 2023-05-22
Payer: MEDICARE

## 2023-05-22 NOTE — NURSING
Belatacept ([employer]) Note:    Antwon Valdez  is a 58 y.o. male  S/p  RIGHT KIDNEY  transplant on 12/25/2021 (Kidney) for Diabetes Mellitus - Type Other / Unknown.      Met with patient and his caregiver in the clinic to review current medication list and potential change in immunosuppression.    Current Outpatient Medications   Medication Sig Dispense Refill    azelastine (OPTIVAR) 0.05 % ophthalmic solution Place 1 drop into both eyes 2 (two) times daily.      benzoyl peroxide (BENZAC AC) 10 % external wash wash affected areas once or twice daily      blood sugar diagnostic (ACCU-CHEK GUIDE TEST STRIPS MISC) 1 each by abdominal subcutaneous route 3 (three) times daily.      blood sugar diagnostic Strp 1 each by Misc.(Non-Drug; Combo Route) route 3 (three) times daily. 100 each 11    blood-glucose meter kit Use as instructed 1 each 0    cholecalciferol, vitamin D3, (VITAMIN D3) 50 mcg (2,000 unit) Tab Take 2,000 Units by mouth once daily.      CREON 36,000-114,000- 180,000 unit CpDR Take by mouth. As directed      cyanocobalamin (VITAMIN B-12) 1000 MCG tablet Take 1,000 mcg by mouth once daily.      insulin detemir U-100 (LEVEMIR FLEXTOUCH) 100 unit/mL (3 mL) SubQ InPn pen Inject 4 Units into the skin every evening.      ketoconazole (NIZORAL) 2 % shampoo Apply topically twice a week.      lancets Misc 1 each by Misc.(Non-Drug; Combo Route) route 3 (three) times daily. 100 each 11    metronidazole 1% (METROGEL) 1 % Gel Apply topically daily as needed.      multivitamin Tab Take 1 tablet by mouth once daily.      mycophenolate (MYFORTIC) 180 MG TbEC Take 4 tablets (720 mg total) by mouth 2 (two) times daily. 240 tablet 11    NIFEdipine (PROCARDIA-XL) 30 MG (OSM) 24 hr tablet Take 1 tablet (30 mg total) by mouth once daily. 30 tablet 11    pantoprazole (PROTONIX) 40 MG tablet Take 1 tablet (40 mg total) by mouth once daily. 30 tablet 11    predniSONE (DELTASONE) 5 MG tablet Take 1 tablet (5 mg total) by mouth once  daily. 93 tablet 3    rivaroxaban (XARELTO DVT-PE TREAT 30D START) 15 mg (42)- 20 mg (9) tablet dose pack Take 1 tablet (15 mg) by mouth twice daily with food for 21 days followed by 1 tablet (20 mg) by mouth once daily with food      sodium bicarbonate 650 MG tablet Take 2 tablets (1,300 mg total) by mouth 2 (two) times daily. 120 tablet 11    tacrolimus (PROGRAF) 1 MG Cap Take 4 capsules (4 mg total) by mouth every morning AND 3 capsules (3 mg total) every evening. 630 capsule 3    valsartan (DIOVAN) 80 MG tablet Take 1 tablet (80 mg total) by mouth once daily. (Patient taking differently: Take 80 mg by mouth every evening.) 90 tablet 3     No current facility-administered medications for this visit.       Education regarding Belatacept completed, including risks/benefits of conversion:     Benefits of improved renal function post-conversion seen in clinical trials (Pallavi, et al.)  Risk of conversion may include increased risk of rejection, especially in the immediate post-conversion period  There is a higher risk of PTLD seen in patients on belatacept, particularly those recipients who are EBV IgG negative  This immunosuppressant increased infectious risk, similar to other medications in this class  Infusions are generally well tolerated, headache is the most common side effect  Infusions are initially every OTHER week for 5 weeks, then every 28 days (+/- 3 days).  Patients must be able to commit to lifelong infusions  If traveling, infusion schedules must be taken into account OR arrangements for an alternative infusion site should be made  Cost of this medication may be significant, and cost may preclude conversion.  Insurance should remain the same once on this medication, any changes in insurance could lead to discontinuation of medication or issues with cost of medication.  Notifying the coordinator is vital with any changes in insurance once on this therapy.       Assessment/Plan:    1) Current  Immunosuppression: Tacrolimus IR/Mycophenolic Acid, prednisone  Reason for conversion: CNI toxicity     2) EBV IgG status: positive      Quantiferon Gold Status: negative     3) [employer] Distribution Program: [facility] #[id number]    4) Benefits verification: [employer]: Out of network     5) Immunosuppression Plan:     Location/Infusion Provider: infusion through [facility] (as set up through Dr Calvillo's office)    Day 1 (TBD) belatacept 5 mg/kg, continue tacrolimus and mycophenolate per protocol  Day 14 (anticipated TBD)       belatacept 5 mg/kg, continue mycophenolate per protocol, decrease tacrolimus by 50%  Day 28 (anticipated TBD)       belatacept 5 mg/kg, continue mycophenolate per protocol, decrease tacrolimus by an additional 50%  Day 42 (anticipated TBD)       belatacept 5 mg/kg, continue mycophenolate per protocol, continue tacrolimus   Day 56 (anticipated TBD)       belatacept 5 mg/kg, continue mycophenolate per protocol, DISCONTINUE tacrolimus   Day 84 (anticipated TBD)       belatacept 5 mg/kg EVERY 28 DAYS, continue mycophenolate per protocol    Antwon and his caregivers verbalized understanding and had the opportunity to ask questions.

## 2023-05-30 ENCOUNTER — PATIENT MESSAGE (OUTPATIENT)
Dept: TRANSPLANT | Facility: CLINIC | Age: 58
End: 2023-05-30
Payer: MEDICARE

## 2023-06-01 ENCOUNTER — PATIENT MESSAGE (OUTPATIENT)
Dept: TRANSPLANT | Facility: CLINIC | Age: 58
End: 2023-06-01
Payer: MEDICARE

## 2023-06-01 RX ORDER — EPINEPHRINE 0.3 MG/.3ML
0.3 INJECTION SUBCUTANEOUS ONCE AS NEEDED
Status: CANCELLED | OUTPATIENT
Start: 2023-06-05

## 2023-06-01 RX ORDER — HEPARIN 100 UNIT/ML
500 SYRINGE INTRAVENOUS
Status: CANCELLED | OUTPATIENT
Start: 2023-06-05

## 2023-06-01 RX ORDER — DIPHENHYDRAMINE HYDROCHLORIDE 50 MG/ML
50 INJECTION INTRAMUSCULAR; INTRAVENOUS ONCE AS NEEDED
Status: CANCELLED | OUTPATIENT
Start: 2023-06-05

## 2023-06-08 ENCOUNTER — PATIENT MESSAGE (OUTPATIENT)
Dept: TRANSPLANT | Facility: CLINIC | Age: 58
End: 2023-06-08
Payer: MEDICARE

## 2023-06-13 ENCOUNTER — DOCUMENTATION ONLY (OUTPATIENT)
Dept: TRANSPLANT | Facility: CLINIC | Age: 58
End: 2023-06-13
Payer: MEDICARE

## 2023-06-13 ENCOUNTER — PATIENT MESSAGE (OUTPATIENT)
Dept: TRANSPLANT | Facility: CLINIC | Age: 58
End: 2023-06-13
Payer: MEDICARE

## 2023-06-14 ENCOUNTER — TELEPHONE (OUTPATIENT)
Dept: TRANSPLANT | Facility: CLINIC | Age: 58
End: 2023-06-14
Payer: MEDICARE

## 2023-06-14 NOTE — TELEPHONE ENCOUNTER
----- Message from Guerline Goel MD sent at 6/13/2023  4:11 PM CDT -----  Regarding: RE: work clearance  Yes    ----- Message -----  From: Taylor MAX Do, RN  Sent: 6/13/2023   3:50 PM CDT  To: Guerline Goel MD  Subject: work clearance                                   Hi, patient would like to return to work immediately.  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  Is he cleared to work?

## 2023-06-19 ENCOUNTER — PATIENT MESSAGE (OUTPATIENT)
Dept: TRANSPLANT | Facility: CLINIC | Age: 58
End: 2023-06-19
Payer: MEDICARE

## 2023-06-23 ENCOUNTER — DOCUMENTATION ONLY (OUTPATIENT)
Dept: TRANSPLANT | Facility: CLINIC | Age: 58
End: 2023-06-23
Payer: MEDICARE

## 2023-06-23 LAB
EXT ANC: 5.5 (ref 1.4–6.5)
EXT BACTERIA UA: ABNORMAL
EXT BK VIRUS DNA QN PCR: NEGATIVE
EXT BUN: 23 (ref 6–20)
EXT CALCIUM: 9.1 (ref 8.5–10.5)
EXT CHLORIDE: 106 (ref 101–112)
EXT CO2: 25 (ref 18–32)
EXT CREATININE UA: 28.5
EXT CREATININE: 1.29 MG/DL (ref 0.6–1.2)
EXT EOSINOPHIL%: 0.2 (ref 0–0.7)
EXT GFR MDRD NON AF AMER: >60
EXT GLUCOSE UA: NEGATIVE
EXT GLUCOSE: 95 (ref 74–106)
EXT HEMATOCRIT: 48.1 (ref 43.5–53.7)
EXT HEMOGLOBIN: 14.8 (ref 14.1–18.1)
EXT LYMPH%: 3 (ref 1.2–3.4)
EXT MONOCYTES%: 0.8 (ref 0.1–0.6)
EXT NITRITES UA: NEGATIVE
EXT PLATELETS: 252 (ref 142–424)
EXT POTASSIUM: 4.9 (ref 3.4–5.1)
EXT PROT/CREAT RATIO UR: 0.26
EXT PROTEIN UA: NEGATIVE
EXT RBC UA: ABNORMAL
EXT SEGS%: 57.7 (ref 37–80)
EXT SODIUM: 140 MMOL/L (ref 135–145)
EXT TACROLIMUS LVL: 6.4
EXT URINE PROTEIN: 7.5
EXT WBC UA: ABNORMAL
EXT WBC: 9.5 (ref 4.6–10.2)

## 2023-06-23 NOTE — PROGRESS NOTES
Kidney Post-Transplant Assessment    Referring Physician: Héctor Calvillo  Current Nephrologist: Héctor Calvillo    ORGAN: RIGHT KIDNEY  Donor Type: donation after brain death  PHS Increased Risk: no  Cold Ischemia: 1,221 mins  Induction Medications: simulect - basiliximab    Subjective:     CC:  Reassessment of renal allograft function and management of chronic immunosuppression.    HPI:  Mr. Valdez is a 58 y.o. 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 His  baseline creatinine was ~1-1.3  in 2022, then bryan to 1.7.  He takes mycophenolate mofetil, prednisone and tacrolimus for maintenance immunosuppression. His post transplant course has been uncomplicated to date.    Transplant History:  -ESRD secondary to DM.   -on PD until DBD KTX 12/25/21 (Simulect induction, CIT 20h 21m, KDPI 65%, CMV D+/R+).   -Per op note, small mass excised for biopsy at donor site and determined to be benign, but excision site left a 1.5-2cm wide superficial defect that caused expected bleeding after reperfusion that was unable to be sutured due to shape and risk of tearing parenchyma, therefore, evarrest patch applied with complete hemostasis     Pertinent HX:   gastric bypass c/b severe reflux, treated with esoph dilation  DM 1995  PE, incidentally found large PE 12/2020 treated x 3 mos [4 mos after bariatric and foot surgery, found during w/u for SOB]  4/2022 LE DVT --> indefinite xalrelto  Sleep apnea  Hypotension on midodrine since gastric bypass  DKT 12/25/21; CIT 20+h; KDPI 65%;  1/28/22 high ALT- bactrim d/c'd    Interval History 12/23/22:   States diarrhea has been an issue since having GI /gastric surgery. Since txp this has worsened. Now after eating he needs to be close to bathroom . Reports stool is very loose not watery. Has also being taking Imodium which will help intermittently.    Intake 2L +  UOP-no problems reported   Peripheral edema-slight on RLE d/t  blood clot  Appetite-good  Lab /diagnostic results reviewed with patient today.    Has not  re-establish care with general nephrology Dr. Calvillo in the future.     UPDATE 6/26/23  Antwon reports today the bulge above his transplant incision in RLQ has gotten bigger and more uncomfortable. He adds he finds it hard to sleep on that side. Hs notes BPs had gotten into  sys with nifed 60, so he cut back to 30 mg daily. He denies any history of  issues such as UTIs or kidney stones. Antwon reports no LUTS.   He also reports being unable to get approval for belatacept, details unknown.    Current Outpatient Medications   Medication Sig    azelastine (OPTIVAR) 0.05 % ophthalmic solution Place 1 drop into both eyes 2 (two) times daily.    benzoyl peroxide (BENZAC AC) 10 % external wash wash affected areas once or twice daily    blood sugar diagnostic (ACCU-CHEK GUIDE TEST STRIPS MISC) 1 each by abdominal subcutaneous route 3 (three) times daily.    blood sugar diagnostic Strp 1 each by Misc.(Non-Drug; Combo Route) route 3 (three) times daily.    blood-glucose meter kit Use as instructed    cholecalciferol, vitamin D3, (VITAMIN D3) 50 mcg (2,000 unit) Tab Take 2,000 Units by mouth once daily.    CREON 36,000-114,000- 180,000 unit CpDR Take by mouth. As directed    cyanocobalamin (VITAMIN B-12) 1000 MCG tablet Take 1,000 mcg by mouth once daily.    insulin detemir U-100 (LEVEMIR FLEXTOUCH) 100 unit/mL (3 mL) SubQ InPn pen Inject 4 Units into the skin every evening.    ketoconazole (NIZORAL) 2 % shampoo Apply topically twice a week.    lancets Misc 1 each by Misc.(Non-Drug; Combo Route) route 3 (three) times daily.    metronidazole 1% (METROGEL) 1 % Gel Apply topically daily as needed.    multivitamin Tab Take 1 tablet by mouth once daily.    mycophenolate (MYFORTIC) 180 MG TbEC Take 4 tablets (720 mg total) by mouth 2 (two) times daily.    NIFEdipine (PROCARDIA-XL) 30 MG (OSM) 24 hr tablet Take 1 tablet (30 mg total)  "by mouth once daily.    pantoprazole (PROTONIX) 40 MG tablet Take 1 tablet (40 mg total) by mouth once daily.    predniSONE (DELTASONE) 5 MG tablet Take 1 tablet (5 mg total) by mouth once daily.    rivaroxaban (XARELTO DVT-PE TREAT 30D START) 15 mg (42)- 20 mg (9) tablet dose pack Take 1 tablet (15 mg) by mouth twice daily with food for 21 days followed by 1 tablet (20 mg) by mouth once daily with food    sodium bicarbonate 650 MG tablet Take 2 tablets (1,300 mg total) by mouth 2 (two) times daily.    tacrolimus (PROGRAF) 1 MG Cap Take 4 capsules (4 mg total) by mouth every morning AND 3 capsules (3 mg total) every evening.    valsartan (DIOVAN) 80 MG tablet Take 1 tablet (80 mg total) by mouth once daily. (Patient taking differently: Take 80 mg by mouth every evening.)     No current facility-administered medications for this visit.       Past Medical History:   Diagnosis Date    Chronic pulmonary embolism 6/1/2021    Diabetes mellitus     Diabetic nephropathy associated with type 2 diabetes mellitus 6/1/2021    Disorder of kidney and ureter     ESRD (end stage renal disease) 6/1/2021    Essential hypertension 6/1/2021    GERD (gastroesophageal reflux disease)     Mixed hyperlipidemia 6/1/2021    Sleep apnea 6/1/2021       Review of Systems   Constitutional: Negative.    Eyes:  Positive for visual disturbance.        Wears glasses   Respiratory:  Negative for shortness of breath.    Cardiovascular:  Negative for chest pain and leg swelling.   Gastrointestinal:         Bulge RLQ - see HPI   Genitourinary:  Negative for difficulty urinating and frequency.   Musculoskeletal:  Negative for gait problem.   Allergic/Immunologic: Positive for immunocompromised state.   Neurological:  Negative for weakness.     Objective:   Blood pressure 139/70, pulse 65, temperature 97.2 °F (36.2 °C), resp. rate 18, height 5' 11.26" (1.81 m), weight 93 kg (205 lb 0.4 oz), SpO2 98 %.body mass index is 28.39 kg/m².    Physical " Exam  Constitutional:       Appearance: Normal appearance.   Cardiovascular:      Rate and Rhythm: Normal rate and regular rhythm.   Pulmonary:      Effort: Pulmonary effort is normal.      Breath sounds: Normal breath sounds.   Abdominal:      Palpations: Abdomen is soft. There is no mass.      Tenderness: There is no abdominal tenderness.   Musculoskeletal:      Right lower leg: No edema.      Left lower leg: No edema.       Labs:  Lab Results   Component Value Date    WBC 10.92 2023    HGB 13.6 (L) 2023    HCT 43.1 2023     2023    K 3.9 2023     (H) 2023    CO2 17 (L) 2023    BUN 24 (H) 2023    CREATININE 1.4 2023    EGFRNONAA >60.0 2022    CALCIUM 9.5 2023    PHOS 3.1 2023    MG 1.6 2023    ALBUMIN 3.7 2023    ALBUMIN 3.7 2023    AST 18 2023    ALT 32 2023    UTPCR Unable to calculate 2022    PTH 79.1 (H) 2023    TACROLIMUS 7.7 2023       Lab Results   Component Value Date    EXTWBC 9.5 2023    EXTSEGS 5.5 2023    EXTPLATELETS 252 2023    EXTHEMOGLOBI 14.8 2023    EXTHEMATOCRI 48.1 2023    EXTCREATININ 1.29 (H) 2023    EXTCREATININ 28.5 2023    EXTSODIUM 140 2023    EXTPOTASSIUM 4.9 2023    EXTBUN 23 (H) 2023    EXTCO2 25 2023    EXTCALCIUM 9.1 2023    EXTPHOSPHORU 3.5 2022    EXTGLUCOSE 95 2023    EXTALBUMIN 3.9 2022    EXTAST 22 2022    EXTALT 38 2022    EXTBILITOTAL 0.7 2022       Lab Results   Component Value Date    EXTTACROLVL 6.4 2023    EXTPROTCRE 0.26 2023    EXTPTHINTACT 171.1 (A) 2022    EXTPROTEINUA negative 2023    EXTWBCUA none seen 2023    EXTRBCUA none seen 2023       Labs were reviewed with the patient    Assessment:     1. Stage 3a chronic kidney disease    2. -donor kidney transplant    3. Immunosuppressive  "management encounter following kidney transplant    4. Prophylactic immunotherapy    5. At risk for opportunistic infections          Plan:   -surgeon to review "bulge" and discomfort RLQ with prior CT showign no hernia  -Request update on jose conversion   -RTC 6 mo     CKD II-IIIa post  donor kidney transplant 2021  -creatinine had risen 1.7, has had issues with diarrhea off and on.  Now back down to baseline of 1-1.2  -negligible proteinuria noted  -continue monitoring as per transplant guideline.    -I emphasized importance of ongoing follow-up with home nephrologist as our visit stretch out will eventually become p.r.n.    Immunosuppression management for patient on chronic prophylactic immunosuppression  -Continue tacrolimus, MMF, prednisone  -target tacrolimus level 5-7, presently at target no change needed  -continue monitoring for side effects and toxicity, nothing new identified today. Some suspicion for functional CNI toxicity noted on last biopsy.    At risk for opportunistic infections   -negative for BK 2023.  Continue screening for BK as per guideline the patient at risk nephropathy graft failure if develops unchecked bk infection    Bulge RLQ  -will review with surgery; prior CT without hernia    Follow-up:   Clinic: return to transplant clinic weekly for the first month after transplant; every 2 weeks during months 2-3; then at 6-, 9-, 12-, 18-, 24-, and 36- months post-transplant to reassess for complications from immunosuppression toxicity and monitor for rejection.  Annually thereafter.    Labs: since patient remains at high risk for rejection and drug-related complications that warrant close monitoring, labs will be ordered as follows: continue twice weekly CBC, renal panel, and drug level for first month; then same labs once weekly through 3rd month post-transplant.  Urine for UA and protein/creatinine ratio monthly.  Serum BK - PCR at 1-, 3-, 6-, 9-, 12-, 18-, 24-, 36-, " 48-, and 60 months post-transplant.  Hepatic panel at 1-, 2-, 3-, 6-, 9-, 12-, 18-, 24-, and 36- months post-transplant.    Guerline Goel MD

## 2023-06-26 ENCOUNTER — OFFICE VISIT (OUTPATIENT)
Dept: TRANSPLANT | Facility: CLINIC | Age: 58
End: 2023-06-26
Payer: MEDICARE

## 2023-06-26 VITALS
WEIGHT: 205 LBS | RESPIRATION RATE: 18 BRPM | HEART RATE: 65 BPM | HEIGHT: 71 IN | SYSTOLIC BLOOD PRESSURE: 139 MMHG | OXYGEN SATURATION: 98 % | DIASTOLIC BLOOD PRESSURE: 70 MMHG | TEMPERATURE: 97 F | BODY MASS INDEX: 28.7 KG/M2

## 2023-06-26 DIAGNOSIS — Z29.89 PROPHYLACTIC IMMUNOTHERAPY: ICD-10-CM

## 2023-06-26 DIAGNOSIS — N18.31 STAGE 3A CHRONIC KIDNEY DISEASE: Primary | ICD-10-CM

## 2023-06-26 DIAGNOSIS — Z79.899 IMMUNOSUPPRESSIVE MANAGEMENT ENCOUNTER FOLLOWING KIDNEY TRANSPLANT: ICD-10-CM

## 2023-06-26 DIAGNOSIS — Z91.89 AT RISK FOR OPPORTUNISTIC INFECTIONS: ICD-10-CM

## 2023-06-26 DIAGNOSIS — Z94.0 IMMUNOSUPPRESSIVE MANAGEMENT ENCOUNTER FOLLOWING KIDNEY TRANSPLANT: ICD-10-CM

## 2023-06-26 DIAGNOSIS — Z94.0 DECEASED-DONOR KIDNEY TRANSPLANT: ICD-10-CM

## 2023-06-26 PROCEDURE — 99999 PR PBB SHADOW E&M-EST. PATIENT-LVL IV: ICD-10-PCS | Mod: PBBFAC,,, | Performed by: INTERNAL MEDICINE

## 2023-06-26 PROCEDURE — 3008F BODY MASS INDEX DOCD: CPT | Mod: CPTII,S$GLB,, | Performed by: INTERNAL MEDICINE

## 2023-06-26 PROCEDURE — 99999 PR PBB SHADOW E&M-EST. PATIENT-LVL IV: CPT | Mod: PBBFAC,,, | Performed by: INTERNAL MEDICINE

## 2023-06-26 PROCEDURE — 1160F RVW MEDS BY RX/DR IN RCRD: CPT | Mod: CPTII,S$GLB,, | Performed by: INTERNAL MEDICINE

## 2023-06-26 PROCEDURE — 3075F SYST BP GE 130 - 139MM HG: CPT | Mod: CPTII,S$GLB,, | Performed by: INTERNAL MEDICINE

## 2023-06-26 PROCEDURE — 1159F MED LIST DOCD IN RCRD: CPT | Mod: CPTII,S$GLB,, | Performed by: INTERNAL MEDICINE

## 2023-06-26 PROCEDURE — 1159F PR MEDICATION LIST DOCUMENTED IN MEDICAL RECORD: ICD-10-PCS | Mod: CPTII,S$GLB,, | Performed by: INTERNAL MEDICINE

## 2023-06-26 PROCEDURE — 1160F PR REVIEW ALL MEDS BY PRESCRIBER/CLIN PHARMACIST DOCUMENTED: ICD-10-PCS | Mod: CPTII,S$GLB,, | Performed by: INTERNAL MEDICINE

## 2023-06-26 PROCEDURE — 3066F PR DOCUMENTATION OF TREATMENT FOR NEPHROPATHY: ICD-10-PCS | Mod: CPTII,S$GLB,, | Performed by: INTERNAL MEDICINE

## 2023-06-26 PROCEDURE — 3078F DIAST BP <80 MM HG: CPT | Mod: CPTII,S$GLB,, | Performed by: INTERNAL MEDICINE

## 2023-06-26 PROCEDURE — 4010F PR ACE/ARB THEARPY RXD/TAKEN: ICD-10-PCS | Mod: CPTII,S$GLB,, | Performed by: INTERNAL MEDICINE

## 2023-06-26 PROCEDURE — 3078F PR MOST RECENT DIASTOLIC BLOOD PRESSURE < 80 MM HG: ICD-10-PCS | Mod: CPTII,S$GLB,, | Performed by: INTERNAL MEDICINE

## 2023-06-26 PROCEDURE — 99215 OFFICE O/P EST HI 40 MIN: CPT | Mod: S$GLB,,, | Performed by: INTERNAL MEDICINE

## 2023-06-26 PROCEDURE — 3075F PR MOST RECENT SYSTOLIC BLOOD PRESS GE 130-139MM HG: ICD-10-PCS | Mod: CPTII,S$GLB,, | Performed by: INTERNAL MEDICINE

## 2023-06-26 PROCEDURE — 3066F NEPHROPATHY DOC TX: CPT | Mod: CPTII,S$GLB,, | Performed by: INTERNAL MEDICINE

## 2023-06-26 PROCEDURE — 3008F PR BODY MASS INDEX (BMI) DOCUMENTED: ICD-10-PCS | Mod: CPTII,S$GLB,, | Performed by: INTERNAL MEDICINE

## 2023-06-26 PROCEDURE — 99215 PR OFFICE/OUTPT VISIT, EST, LEVL V, 40-54 MIN: ICD-10-PCS | Mod: S$GLB,,, | Performed by: INTERNAL MEDICINE

## 2023-06-26 PROCEDURE — 4010F ACE/ARB THERAPY RXD/TAKEN: CPT | Mod: CPTII,S$GLB,, | Performed by: INTERNAL MEDICINE

## 2023-06-26 NOTE — PATIENT INSTRUCTIONS
Return in 6 mos, about 2 years after transplant    -You will need a team to care for you the best way possible! For your health, you should follow with your general nephrologist, primary care provider/PCP, dentist and eye doctor for routine/preventative care. If you are a diabetic, you should see a podiatrist for regular foot checks. If you need help establishing with one, let us know.    -Remember to keep transplant posted about medication changes or new developments in your health. These may warrant changing your immunosuppression. We are not automatically notified of changes in your condition, so please call or message us with any updates.    -Don't forget we have pharmacist, dietician and social workers that are able to help you, at your request.    -We recommend you stay up to date with vaccines - your primary care provider will help with this, since vaccine recommendations vary by age and get updated regularly. You should get a yearly influenza vaccine. It does not protect you against all infections, and you can still get the flu. The influenza vaccine has been shown to help keep patients from having as severe disease compared to a group of unvaccinated patients.    DOs and DON'Ts FOR TRANSPLANT PATIENTS USING OVER THE COUNTER REMEDIES    Acne  - Clean affected areas with Panoxyl foaming wash [or any other wash containing 10% benzoyl peroxide] - follow direction on package insert and beware it can bleach fabrics and hair.  - You may also try Differin [adapalene gel  0.1%] to the affected areas as per package insert as well.  - A good moisturizer may be needed if skin dries out. Cetaphil and Cerave make ones often recommended by dermatologists.  - Don't forget to protect your skin from the ski, since you are at increased risk of skin cancer.    Hair Loss - Minoxidil (Rogaine) topically on scalp. Note it wears off if you stop using, so plan on stayingon it as long as you want the effects to last.    Arthritis    - We do not recommend NSAIDS  by mouth such as Motrin, Advil, Aleve, naprosen, BC powders, etc. They can cause harm to the kidney. If in doubt, please check!  - Acetaminophen up to 3000 mg (3 grams) every 24 hrs is safe. May people try 1 gram (2 extra strength) tabs/caps every 8 hours.  - Topical medicines are OK: Voltaren (diclofenac) gel, lidocaine gel, lidocaine patches, salon pas patches  - Avoid Turmeric (active ingredient: curcumin) as it interacts with your immunosuppression    Fever or pain   - Tylenol (acetaminophen).   - For pain you can try salon pas or lidocaine patches to be applied directly to area of pain.   - Diclofenac [Voltaren] gel is safe to use for a few weeks.    - We do not recommend NSAIDS  by mouth such as Motrin or Advil (ibuprofen), Aleve or naprosen (naproxen), BC powders, etc. If in doubt, please check as there are several others that can be prescribed!    Cough Suppressant - Dextromethorphan Hydrobromide 30 mg or cough drops (Halls or equivalent generic)    Nasal congestion   - Saline nasal spray is very safe.   - Oxymetazoline (Afrin), but should ONLY USE FOR 3 DAYS.   - Topical Vicks vaporub or Vicks nasal inhaler is also acceptable.  - Note tablet form decongestants (Sudafed, phenylephrine) can raise blood pressure and are not recommended    Allergy symptoms - Antihistamines are safe: diphenhydramine (Benadryl), loratadine (Claritin), cetrizine (Zyrtec). These can also help dry up drainage.    For sore throat -  salt water gargles, sore throat sprays like Chloraseptic or sore throat lozenges are safe for temporary relief    For thick secretions (thick mucous) - Guaifenesin (Mucinex), saline nasal spray     To prevent colds - low dose vitamin C is probably OK, but can increase risk of kidney stones. Zinc lozenges (not tablets) taken through the day may help minimize. Let the lozenge dissolve in the back of your throat. Note: Zinc can inhibit the virus from multiplying in your  throat, but it is not proven to prevent colds.    For memory Loss - OK to try Prevagen    Indigestion  - famotidine (Pepcid) 20 mg daily is generally safe. Calcium carbonate (Tums and many other brands) can cause high calcium and should be taken only after taking to your provider about your calcium levels. High calcium can hurt the kidney. Avoid cimetidine (Tagamet) as it can interfere with your immunosuppression and cause toxic levels. A few doses of pepto bismol should be fine, but continued indigestion should be evaluated by your provider.    Nausea or vomiting - these symptoms can indicate there is something wrong with your stomach and should be evaluated if they last more than 6-8 hours OR anytime you cannot keep your medicines down. Not being able to take your medicine can cause complications.     Diarrhea -  Pepto-Bismol, even Metamucil can help liquid stools. Diarrhea can be a sign of infection, so please let your provider know if it continues past 1-2 days for full evaluation. Avoid Imodium unless you have spoken to a provider.    Constipation- Colace, Dulcolax, MiraLax are safe to take regardless of your kidney function.  Please check with the provider before using magnesium or phosphorus containing supplements such as magnesium citrate, milk of magnesium, or Fleet's Phospho-Soda.  These remedies may cause harm, depending on the degree of kidney insufficiency you have.  Your doctor can advise if year magnesium and phosphorus levels are low enough to take these.    Herbal and natural remedies - Some herbals are very safe and effective while others are proven to cause renal failure or interact with your medication. Do NOT take any natural or herbal remedies without discussing with transplant.    If you develop fever or shortness of breath, or start to feel worse, you need to get checked out by a provider in a clinic or go to ED, depending on the severity of your symptoms.    As always, feel free to ask any  questions and raise any concerns regarding your health care.    Best Wishes,  Dr. Zuleyma Goel and your entire transplant team    If you are a smoker, it is important for your health to stop smoking. Please be aware that second hand smoke is also harmful.

## 2023-06-26 NOTE — LETTER
June 26, 2023        Héctor Calvillo  [address]  [facility]  [place] [address]  Phone: [phone]  Fax: [phone]             [facility]- Transplant 1st Fl  [address]  [facility] [address]  Phone: [phone]   Patient: Antwon Valdez   MR Number: [id number]   YOB: 1965   Date of Visit: 6/26/2023       Dear Dr. Héctor Calvillo    Thank you for referring Antwon Valdez to me for evaluation. Attached you will find relevant portions of my assessment and plan of care.    If you have questions, please do not hesitate to call me. I look forward to following Antwon Valdez along with you.    Sincerely,    Guerline Goel MD    Enclosure    If you would like to receive this communication electronically, please contact [email or web address] or [phone] to request [employer] access.    [employer] is a tool which provides read-only access to select patient information with whom you have a relationship. Its easy to use and provides real time access to review your patients record including encounter summaries, notes, results, and demographic information.    If you feel you have received this communication in error or would no longer like to receive these types of communications, please e-mail [email or web address]

## 2023-07-05 ENCOUNTER — PATIENT MESSAGE (OUTPATIENT)
Dept: TRANSPLANT | Facility: CLINIC | Age: 58
End: 2023-07-05
Payer: MEDICARE

## 2023-07-06 ENCOUNTER — TELEPHONE (OUTPATIENT)
Dept: TRANSPLANT | Facility: CLINIC | Age: 58
End: 2023-07-06
Payer: MEDICARE

## 2023-07-06 DIAGNOSIS — R19.03 RLQ ABDOMINAL MASS: Primary | ICD-10-CM

## 2023-07-06 NOTE — TELEPHONE ENCOUNTER
----- Message from Guerline Goel MD sent at 7/5/2023  5:31 PM CDT -----  Regarding: RE: bulge  Thanks for reminder. Let's get CT abd/pelvis WITH oral contrast [no IV] to reassess enlarging bulge.  Plan to review at Wed meetign with surgery.    ----- Message -----  From: Taylor MAX Do, RN  Sent: 7/5/2023   2:22 PM CDT  To: Guerline Goel MD  Subject: bulge                                            Hi Dr. Navarro,  Patient was asking for an update on the bulge that you assessed last week in clinic.  Were you able to review with a surgeon?    Swathi

## 2023-07-13 ENCOUNTER — DOCUMENTATION ONLY (OUTPATIENT)
Dept: TRANSPLANT | Facility: CLINIC | Age: 58
End: 2023-07-13
Payer: MEDICARE

## 2023-07-13 LAB
CASTS, UA: PRESENT
EXT ANC: 5.1 (ref 1.4–6.5)
EXT BACTERIA UA: ABNORMAL
EXT BUN: 30 (ref 8.5–10.5)
EXT CALCIUM: 9 (ref 8.5–10.5)
EXT CHLORIDE: 110 (ref 101–112)
EXT CO2: 18 (ref 18–32)
EXT CREATININE: 2.2 MG/DL (ref 0.6–1.2)
EXT GFR MDRD NON AF AMER: 34
EXT GLUCOSE UA: 100
EXT GLUCOSE: 145 (ref 74–106)
EXT HEMATOCRIT: 49.9 (ref 43.5–53.7)
EXT HEMOGLOBIN: 16.1 (ref 14.1–18.1)
EXT NITRITES UA: NEGATIVE
EXT PLATELETS: 298 (ref 142–424)
EXT POTASSIUM: 3.9 (ref 3.4–5.1)
EXT PROTEIN UA: 30
EXT RBC UA: ABNORMAL
EXT SODIUM: 140 MMOL/L (ref 135–145)
EXT URINE CULTURE: ABNORMAL
EXT WBC UA: ABNORMAL
EXT WBC: 9.4 (ref 4.6–10.2)
MUCOUS THREADS #/AREA URNS HPF: ABNORMAL /HPF

## 2023-07-14 NOTE — PROGRESS NOTES
Please urine culture  Hydration in next couple days   Repeat lab on Monday, if Cr remains high, needs more investigation. Add DSA, BK PCR and UPCR to the lab On Monday.

## 2023-07-19 ENCOUNTER — PATIENT MESSAGE (OUTPATIENT)
Dept: TRANSPLANT | Facility: CLINIC | Age: 58
End: 2023-07-19
Payer: MEDICARE

## 2023-07-19 ENCOUNTER — DOCUMENTATION ONLY (OUTPATIENT)
Dept: TRANSPLANT | Facility: CLINIC | Age: 58
End: 2023-07-19
Payer: MEDICARE

## 2023-07-19 LAB
EXT ANC: 6.2 (ref 1.4–6.5)
EXT BK VIRUS DNA QN PCR: NEGATIVE
EXT BUN: 23 (ref 6–20)
EXT CALCIUM: 8.9 (ref 8.5–10.5)
EXT CHLORIDE: 109 (ref 101–112)
EXT CO2: 17 (ref 18–32)
EXT CREATININE UA: 161.6
EXT CREATININE: 1.45 MG/DL (ref 0.6–1.2)
EXT EOSINOPHIL%: 1 (ref 0–7)
EXT GFR MDRD NON AF AMER: 56
EXT GLUCOSE: 115 (ref 74–106)
EXT HEMATOCRIT: 50.6 (ref 43.5–53.7)
EXT HEMOGLOBIN: 15.8 (ref 14.1–18.1)
EXT LYMPH%: 27.7 (ref 10–50)
EXT MONOCYTES%: 9.3 (ref 0–12)
EXT PLATELETS: 276 (ref 142–424)
EXT POTASSIUM: 3.7 (ref 3.4–5.1)
EXT PROT/CREAT RATIO UR: 0.28
EXT SEGS%: 61.8 (ref 37–80)
EXT SODIUM: 136 MMOL/L (ref 135–145)
EXT TACROLIMUS LVL: 6.4
EXT URINE PROTEIN: 44.7
EXT WBC: 10 (ref 4.6–10.2)

## 2023-07-25 ENCOUNTER — PATIENT MESSAGE (OUTPATIENT)
Dept: TRANSPLANT | Facility: CLINIC | Age: 58
End: 2023-07-25
Payer: MEDICARE

## 2023-07-26 NOTE — PROGRESS NOTES
Cr trended down. The results reviewed, no change required.  Does he have general nephrologist to follow with?  Lab in a month

## 2023-08-01 ENCOUNTER — PATIENT MESSAGE (OUTPATIENT)
Dept: TRANSPLANT | Facility: CLINIC | Age: 58
End: 2023-08-01
Payer: MEDICARE

## 2023-08-07 DIAGNOSIS — Z94.0 S/P KIDNEY TRANSPLANT: Primary | ICD-10-CM

## 2023-08-08 ENCOUNTER — DOCUMENTATION ONLY (OUTPATIENT)
Dept: TRANSPLANT | Facility: CLINIC | Age: 58
End: 2023-08-08
Payer: MEDICARE

## 2023-08-08 DIAGNOSIS — Z94.0 S/P KIDNEY TRANSPLANT: ICD-10-CM

## 2023-08-08 LAB
EXT BUN: 22 (ref 6–20)
EXT CALCIUM: 8.7 (ref 8.5–10.5)
EXT CHLORIDE: 109 (ref 101–112)
EXT CO2: 22 (ref 18–32)
EXT CREATININE: 1.47 MG/DL (ref 0.6–1.2)
EXT GFR MDRD NON AF AMER: 55
EXT GLUCOSE: 95 (ref 74–106)
EXT POTASSIUM: 3.6 (ref 3.4–5.1)
EXT SODIUM: 143 MMOL/L (ref 135–145)
EXT TACROLIMUS LVL: 9.3

## 2023-08-08 RX ORDER — TACROLIMUS 1 MG/1
3 CAPSULE ORAL EVERY 12 HOURS
Qty: 180 CAPSULE | Refills: 11 | Status: SHIPPED | OUTPATIENT
Start: 2023-08-08 | End: 2023-12-27

## 2023-08-08 NOTE — TELEPHONE ENCOUNTER
Received written order from Dr. Navarro.  Messaged patient and instructed to decrease Prograf to 3 mg twice a day and repeat a prograf level on 8/22/23.  Patient responded stating understanding.      ----- Message -----  From: Guerline Goel MD  Sent: 8/8/2023   1:13 PM CDT  To: [facility] Post-Kidney Transplant Clinical    If this 12 hr trough, lower tacro to 3 mg BID  Repeat tacro in 2 weeks

## 2023-08-23 ENCOUNTER — PATIENT MESSAGE (OUTPATIENT)
Dept: TRANSPLANT | Facility: CLINIC | Age: 58
End: 2023-08-23
Payer: MEDICARE

## 2023-08-23 ENCOUNTER — DOCUMENTATION ONLY (OUTPATIENT)
Dept: TRANSPLANT | Facility: CLINIC | Age: 58
End: 2023-08-23
Payer: MEDICARE

## 2023-08-23 LAB
EXT BUN: 21 (ref 6–21)
EXT CALCIUM: 9 (ref 8.5–10.5)
EXT CHLORIDE: 107 (ref 101–112)
EXT CO2: 23 (ref 18–32)
EXT CREATININE: 1.5 MG/DL (ref 0.6–1.2)
EXT GFR MDRD NON AF AMER: 53
EXT POTASSIUM: 4.2 (ref 3.4–5.1)
EXT SODIUM: 139 MMOL/L (ref 135–145)
EXT TACROLIMUS LVL: 6.3

## 2023-08-23 NOTE — PROGRESS NOTES
Belatacept (Nulojix) Note:     Antwon Valdez  is a 58 y.o. male  S/p  RIGHT KIDNEY  transplant on 12/25/2021 (Kidney) for Diabetes Mellitus - Type Other / Unknown.       Met with patient and his caregiver in the clinic to review current medication list and potential change in immunosuppression.     Current Medications          Current Outpatient Medications   Medication Sig Dispense Refill    azelastine (OPTIVAR) 0.05 % ophthalmic solution Place 1 drop into both eyes 2 (two) times daily.        benzoyl peroxide (BENZAC AC) 10 % external wash wash affected areas once or twice daily        blood sugar diagnostic (ACCU-CHEK GUIDE TEST STRIPS MISC) 1 each by abdominal subcutaneous route 3 (three) times daily.        blood sugar diagnostic Strp 1 each by Misc.(Non-Drug; Combo Route) route 3 (three) times daily. 100 each 11    blood-glucose meter kit Use as instructed 1 each 0    cholecalciferol, vitamin D3, (VITAMIN D3) 50 mcg (2,000 unit) Tab Take 2,000 Units by mouth once daily.        CREON 36,000-114,000- 180,000 unit CpDR Take by mouth. As directed        cyanocobalamin (VITAMIN B-12) 1000 MCG tablet Take 1,000 mcg by mouth once daily.        insulin detemir U-100 (LEVEMIR FLEXTOUCH) 100 unit/mL (3 mL) SubQ InPn pen Inject 4 Units into the skin every evening.        ketoconazole (NIZORAL) 2 % shampoo Apply topically twice a week.        lancets Misc 1 each by Misc.(Non-Drug; Combo Route) route 3 (three) times daily. 100 each 11    metronidazole 1% (METROGEL) 1 % Gel Apply topically daily as needed.        multivitamin Tab Take 1 tablet by mouth once daily.        mycophenolate (MYFORTIC) 180 MG TbEC Take 4 tablets (720 mg total) by mouth 2 (two) times daily. 240 tablet 11    NIFEdipine (PROCARDIA-XL) 30 MG (OSM) 24 hr tablet Take 1 tablet (30 mg total) by mouth once daily. 30 tablet 11    pantoprazole (PROTONIX) 40 MG tablet Take 1 tablet (40 mg total) by mouth once daily. 30 tablet 11    predniSONE (DELTASONE) 5 MG  tablet Take 1 tablet (5 mg total) by mouth once daily. 93 tablet 3    rivaroxaban (XARELTO DVT-PE TREAT 30D START) 15 mg (42)- 20 mg (9) tablet dose pack Take 1 tablet (15 mg) by mouth twice daily with food for 21 days followed by 1 tablet (20 mg) by mouth once daily with food        sodium bicarbonate 650 MG tablet Take 2 tablets (1,300 mg total) by mouth 2 (two) times daily. 120 tablet 11    tacrolimus (PROGRAF) 1 MG Cap Take 4 capsules (4 mg total) by mouth every morning AND 3 capsules (3 mg total) every evening. 630 capsule 3    valsartan (DIOVAN) 80 MG tablet Take 1 tablet (80 mg total) by mouth once daily. (Patient taking differently: Take 80 mg by mouth every evening.) 90 tablet 3      No current facility-administered medications for this visit.            Education regarding Belatacept completed, including risks/benefits of conversion:     Benefits of improved renal function post-conversion seen in clinical trials (Pallavi et al.)  Risk of conversion may include increased risk of rejection, especially in the immediate post-conversion period  There is a higher risk of PTLD seen in patients on belatacept, particularly those recipients who are EBV IgG negative  This immunosuppressant increased infectious risk, similar to other medications in this class  Infusions are generally well tolerated, headache is the most common side effect  Infusions are initially every OTHER week for 5 weeks, then every 28 days (+/- 3 days).  Patients must be able to commit to lifelong infusions  If traveling, infusion schedules must be taken into account OR arrangements for an alternative infusion site should be made  Cost of this medication may be significant, and cost may preclude conversion.  Insurance should remain the same once on this medication, any changes in insurance could lead to discontinuation of medication or issues with cost of medication.  Notifying the coordinator is vital with any changes in insurance once on this  therapy.         Assessment/Plan:     1) Current Immunosuppression: Tacrolimus IR/Mycophenolic Acid, prednisone  Reason for conversion: CNI toxicity     2) EBV IgG status: positive      Quantiferon Gold Status: negative     3) [employer] Distribution Program: Located within [facility] #[id number]     4) Benefits verification: Biomatrix: Out of network      5) Immunosuppression Plan:      Location/Infusion Provider: infusion through [facility] ([facility] [place]  Day 1 (8/28)     belatacept 5 mg/kg, continue tacrolimus and mycophenolate per protocol  Day 14 (anticipated 9/11)       belatacept 5 mg/kg, continue mycophenolate per protocol, decrease tacrolimus by 50% (2mg BID)  Day 28 (anticipated 9/25)       belatacept 5 mg/kg, continue mycophenolate per protocol, decrease tacrolimus by an additional 50% (1mg BID)  Day 42 (anticipated 10/9)       belatacept 5 mg/kg, continue mycophenolate per protocol, continue tacrolimus   Day 56 (anticipated 10/23)       belatacept 5 mg/kg, continue mycophenolate per protocol, DISCONTINUE tacrolimus   Day 84 (anticipated 11/20)       belatacept 5 mg/kg EVERY 28 DAYS, continue mycophenolate per protocol     Antwon and his caregivers verbalized understanding and had the opportunity to ask questions.

## 2023-08-29 ENCOUNTER — PATIENT MESSAGE (OUTPATIENT)
Dept: TRANSPLANT | Facility: CLINIC | Age: 58
End: 2023-08-29
Payer: MEDICARE

## 2023-08-29 DIAGNOSIS — Z94.0 S/P KIDNEY TRANSPLANT: Primary | ICD-10-CM

## 2023-09-08 ENCOUNTER — OFFICE VISIT (OUTPATIENT)
Dept: TRANSPLANT | Facility: CLINIC | Age: 58
End: 2023-09-08
Payer: MEDICARE

## 2023-09-08 VITALS
OXYGEN SATURATION: 98 % | SYSTOLIC BLOOD PRESSURE: 137 MMHG | BODY MASS INDEX: 27.89 KG/M2 | TEMPERATURE: 97 F | WEIGHT: 205.94 LBS | HEART RATE: 104 BPM | HEIGHT: 72 IN | DIASTOLIC BLOOD PRESSURE: 87 MMHG | RESPIRATION RATE: 18 BRPM

## 2023-09-08 DIAGNOSIS — Z94.0 S/P KIDNEY TRANSPLANT: Primary | ICD-10-CM

## 2023-09-08 DIAGNOSIS — Z79.60 LONG-TERM USE OF IMMUNOSUPPRESSANT MEDICATION: ICD-10-CM

## 2023-09-08 DIAGNOSIS — Z29.89 PROPHYLACTIC IMMUNOTHERAPY: ICD-10-CM

## 2023-09-08 DIAGNOSIS — Z91.89 AT RISK FOR OPPORTUNISTIC INFECTIONS: ICD-10-CM

## 2023-09-08 PROCEDURE — 99215 OFFICE O/P EST HI 40 MIN: CPT | Mod: S$GLB,,, | Performed by: TRANSPLANT SURGERY

## 2023-09-08 PROCEDURE — 1159F MED LIST DOCD IN RCRD: CPT | Mod: CPTII,S$GLB,, | Performed by: TRANSPLANT SURGERY

## 2023-09-08 PROCEDURE — 99215 PR OFFICE/OUTPT VISIT, EST, LEVL V, 40-54 MIN: ICD-10-PCS | Mod: S$GLB,,, | Performed by: TRANSPLANT SURGERY

## 2023-09-08 PROCEDURE — 3066F NEPHROPATHY DOC TX: CPT | Mod: CPTII,S$GLB,, | Performed by: TRANSPLANT SURGERY

## 2023-09-08 PROCEDURE — 1159F PR MEDICATION LIST DOCUMENTED IN MEDICAL RECORD: ICD-10-PCS | Mod: CPTII,S$GLB,, | Performed by: TRANSPLANT SURGERY

## 2023-09-08 PROCEDURE — 4010F ACE/ARB THERAPY RXD/TAKEN: CPT | Mod: CPTII,S$GLB,, | Performed by: TRANSPLANT SURGERY

## 2023-09-08 PROCEDURE — 4010F PR ACE/ARB THEARPY RXD/TAKEN: ICD-10-PCS | Mod: CPTII,S$GLB,, | Performed by: TRANSPLANT SURGERY

## 2023-09-08 PROCEDURE — 99999 PR PBB SHADOW E&M-EST. PATIENT-LVL V: ICD-10-PCS | Mod: PBBFAC,,,

## 2023-09-08 PROCEDURE — 3008F BODY MASS INDEX DOCD: CPT | Mod: CPTII,S$GLB,, | Performed by: TRANSPLANT SURGERY

## 2023-09-08 PROCEDURE — 3066F PR DOCUMENTATION OF TREATMENT FOR NEPHROPATHY: ICD-10-PCS | Mod: CPTII,S$GLB,, | Performed by: TRANSPLANT SURGERY

## 2023-09-08 PROCEDURE — 3079F PR MOST RECENT DIASTOLIC BLOOD PRESSURE 80-89 MM HG: ICD-10-PCS | Mod: CPTII,S$GLB,, | Performed by: TRANSPLANT SURGERY

## 2023-09-08 PROCEDURE — 3079F DIAST BP 80-89 MM HG: CPT | Mod: CPTII,S$GLB,, | Performed by: TRANSPLANT SURGERY

## 2023-09-08 PROCEDURE — 3075F SYST BP GE 130 - 139MM HG: CPT | Mod: CPTII,S$GLB,, | Performed by: TRANSPLANT SURGERY

## 2023-09-08 PROCEDURE — 3075F PR MOST RECENT SYSTOLIC BLOOD PRESS GE 130-139MM HG: ICD-10-PCS | Mod: CPTII,S$GLB,, | Performed by: TRANSPLANT SURGERY

## 2023-09-08 PROCEDURE — 99999 PR PBB SHADOW E&M-EST. PATIENT-LVL V: CPT | Mod: PBBFAC,,,

## 2023-09-08 PROCEDURE — 3008F PR BODY MASS INDEX (BMI) DOCUMENTED: ICD-10-PCS | Mod: CPTII,S$GLB,, | Performed by: TRANSPLANT SURGERY

## 2023-09-08 NOTE — PROGRESS NOTES
Transplant Surgery  Kidney Transplant Recipient Follow-up    Referring Physician: Héctor Calvillo  Current Nephrologist: Héctor Calvillo    Subjective:     Chief Complaint: Antwon Valdez is a 58 y.o. year old White male who is status post Kidney transplant performed on 12/25/2021.    ORGAN:  RIGHT KIDNEY  Disease Etiology: Diabetes Mellitus - Type Other / Unknown  Donor Type:  Donation after Brain Death  Donor CMV Status:  Positive  Donor HBcAB:  Negative  Donor HCV Status:  Negative    History of Present Illness: He reports  RLQ bulge with intermittent pain/discomfort .  From a transplant perspective, he reports normal urination.  Antwon is here for management of his immunosuppression medication.  Antwon states that his immunosuppression is being well tolerated.  Hypertension is not present.    External provider notes reviewed: Yes    Review of Systems   Constitutional:  Negative for activity change, appetite change, chills, diaphoresis, fatigue, fever and unexpected weight change.   HENT:  Negative for congestion, dental problem, ear pain, facial swelling, mouth sores, nosebleeds, sore throat, tinnitus, trouble swallowing and voice change.    Eyes:  Negative for photophobia, pain and visual disturbance.   Respiratory:  Negative for apnea, cough, choking, chest tightness and shortness of breath.    Cardiovascular:  Negative for chest pain, palpitations and leg swelling.   Gastrointestinal:  Negative for abdominal distention, abdominal pain, blood in stool, constipation, diarrhea, nausea and vomiting.        RLQ bulge with intermittent pain/discomfort   Endocrine: Negative for cold intolerance and heat intolerance.   Genitourinary:  Negative for difficulty urinating, dysuria, flank pain, hematuria and urgency.   Musculoskeletal:  Negative for arthralgias and gait problem.   Skin:  Negative for color change, pallor and rash.   Neurological:  Negative for dizziness, tremors, seizures, syncope and  light-headedness.   Hematological:  Negative for adenopathy. Does not bruise/bleed easily.   Psychiatric/Behavioral:  Negative for agitation and confusion.        Objective:     Physical Exam  Constitutional:       General: He is not in acute distress.     Appearance: He is well-developed.   HENT:      Head: Normocephalic and atraumatic.      Mouth/Throat:      Pharynx: No oropharyngeal exudate.   Eyes:      General: No scleral icterus.     Conjunctiva/sclera: Conjunctivae normal.      Pupils: Pupils are equal, round, and reactive to light.   Cardiovascular:      Rate and Rhythm: Normal rate and regular rhythm.      Heart sounds: Normal heart sounds.   Pulmonary:      Effort: Pulmonary effort is normal. No respiratory distress.      Breath sounds: Normal breath sounds.   Abdominal:      General: Bowel sounds are normal. There is no distension.      Palpations: Abdomen is soft.      Tenderness: There is no abdominal tenderness. There is no guarding or rebound.       Musculoskeletal:         General: No swelling. Normal range of motion.      Cervical back: Normal range of motion and neck supple.   Lymphadenopathy:      Cervical: No cervical adenopathy.   Skin:     General: Skin is warm and dry.      Findings: No erythema or rash.   Neurological:      Mental Status: He is alert and oriented to person, place, and time.   Psychiatric:         Mood and Affect: Mood normal.         Behavior: Behavior normal.         Thought Content: Thought content normal.       Lab Results   Component Value Date    CREATININE 1.4 01/13/2023    BUN 24 (H) 01/13/2023     Lab Results   Component Value Date    WBC 10.92 01/13/2023    HGB 13.6 (L) 01/13/2023    HCT 43.1 01/13/2023    HCT 28 (L) 12/25/2021     01/13/2023     Lab Results   Component Value Date    TACROLIMUS 7.7 01/13/2023       Diagnostics:  The following labs have been reviewed: CBC  BMP  TACROLIMUS LEVEL  The following radiology images have been independently reviewed  and interpreted:   CT Abd/Pelvis 7/19/2023: Fascia intact with no evidence of hernia. Right sided rectus muscle atrophy compared to left side.    Assessment and Plan:        S/P Kidney transplant.  Chronic immunosuppressive medications for rejection prophylaxis at target.  Plan: no adjustment needed.  Continue monitoring symptoms, labs and drug levels for drug-related toxicity and side effects.  Renal hypertension at target.  RLQ bulge: No clinical or radiographical evidence of hernia. Likely due to muscle atrophy/weakness. Discussed initiating abdominal wall and core strengthening regimen. No surgical intervention would be of benefit.    Additional testing to be completed according to the Kidney: Written Order Guideline for Kidney Transplant Follow-Up (KI-09)    Interpretation of tests and discussion of patient management involves all members of the multidisciplinary transplant team.  Patient advised that it is recommended that all transplant candidates, and their close contacts and household members receive Covid vaccination.  Follow-up: Patient reminded to call with any health changes, since these can be early signs of significant complications.  Also, I advised the patient to be sure any new medications or changes of old medications are discussed with either a pharmacist, or physician knowledgeable with transplant to avoid rejection/drug toxicity related to significant drug interactions.    Pamella Nieto MD       [facility] Patient Status  Functional Status: 90% - Able to carry on normal activity: minor symptoms of disease  Physical Capacity: No Limitations

## 2023-09-12 RX ORDER — DIPHENHYDRAMINE HYDROCHLORIDE 50 MG/ML
50 INJECTION INTRAMUSCULAR; INTRAVENOUS ONCE AS NEEDED
Status: CANCELLED | OUTPATIENT
Start: 2023-09-13

## 2023-09-12 RX ORDER — EPINEPHRINE 0.3 MG/.3ML
0.3 INJECTION SUBCUTANEOUS ONCE AS NEEDED
Status: CANCELLED | OUTPATIENT
Start: 2023-09-13

## 2023-09-12 RX ORDER — HEPARIN 100 UNIT/ML
500 SYRINGE INTRAVENOUS
Status: CANCELLED | OUTPATIENT
Start: 2023-09-13

## 2023-09-14 ENCOUNTER — TELEPHONE (OUTPATIENT)
Dept: TRANSPLANT | Facility: CLINIC | Age: 58
End: 2023-09-14
Payer: MEDICARE

## 2023-09-14 NOTE — TELEPHONE ENCOUNTER
----- Message from Patricia Clifton PharmD sent at 9/14/2023 11:55 AM CDT -----  Regarding: RE: Belatacept  Yeah, I had called him, but unfortunately don't have any new information.     And I would not taper his tacro at all until he gets 2 to 3 doses of jose (for him especially, but also, in general).    So if he was taking 3/3 before he started, I would go back to that while this is being sorted out.    ----- Message -----  From: Taylor Little RN  Sent: 9/14/2023  11:50 AM CDT  To: Patricia Clifton PharmD  Subject: RE: Belatacept                                   One other thing, should he continue to taper tacro?  He's already made one decrease and is currently taking 2 mg BID.  He was taking 3 mg BID before the taper started.   ----- Message -----  From: Patricia Clifton PharmD  Sent: 9/14/2023  11:38 AM CDT  To: Taylor Little RN  Subject: RE: Belatacept                                   [facility] says they will only pay at an Ambulatory care clinic with a MD or ИВАН present.     The infusion center says they don't have a MD or ИВАН present so they can bill that way.     So - I put in a therapy plan for Ernst (again).  This was previously denied, but since it is now approved by [employer], I am hopeful that this will meet their criteria.     The authorization for Ernst is pending. I called Mr Valdez yesterday and updated him on this plan.  I realize he is due for 2nd dose now and it is unclear when he will get it, so I put the therapy plan in for the entire 5 dose load.      Sorry I don't have a better solution :(    ----- Message -----  From: Taylor Little RN  Sent: 9/14/2023  11:26 AM CDT  To: Patricia Clifton PharmD  Subject: RE: Belatacept                                   Jorge Luis Gomez,  Any updates on this issue?    ----- Message -----  From: Patricia Clifton PharmD  Sent: 9/12/2023  10:36 AM CDT  To: Taylor Little RN  Subject: RE: Belatacept                                   Yes - I was left some  handoff about what is going on, so I have to read through a bit more so I better understand, but will definitely call him today!    ----- Message -----  From: Taylor Little RN  Sent: 9/12/2023   9:37 AM CDT  To: [facility] Pharmacists  Subject: Belatacept                                       Good morning,  Patient went in for his 2nd infusion this morning but was denied.  Said that [employer] wouldn't pay.  Could you check on this and give the patient a call?    Thanks,  Swathi

## 2023-09-20 ENCOUNTER — TELEPHONE (OUTPATIENT)
Dept: INFUSION THERAPY | Facility: HOSPITAL | Age: 58
End: 2023-09-20
Payer: MEDICARE

## 2023-09-20 ENCOUNTER — PATIENT MESSAGE (OUTPATIENT)
Dept: TRANSPLANT | Facility: CLINIC | Age: 58
End: 2023-09-20
Payer: MEDICARE

## 2023-09-26 DIAGNOSIS — Z94.0 S/P KIDNEY TRANSPLANT: Primary | ICD-10-CM

## 2023-09-26 NOTE — PROGRESS NOTES
Belatacept (Nulojix) Note:    Antwon Valdez  is a 58 y.o. male  S/p  RIGHT KIDNEY  transplant on 12/25/2021 (Kidney) for Diabetes Mellitus - Type Other / Unknown.      Met with patient and his caregiver in the clinic to review current medication list and potential change in immunosuppression.    Current Outpatient Medications   Medication Sig Dispense Refill    azelastine (OPTIVAR) 0.05 % ophthalmic solution Place 1 drop into both eyes 2 (two) times daily.      BELatacept 250 mg SolR Inject 5mgkg every 2 weeks for 5 doses, then every 4 weeks +/- 3 days      benzoyl peroxide (BENZAC AC) 10 % external wash wash affected areas once or twice daily      blood sugar diagnostic (ACCU-CHEK GUIDE TEST STRIPS MISC) 1 each by abdominal subcutaneous route 3 (three) times daily.      blood sugar diagnostic Strp 1 each by Misc.(Non-Drug; Combo Route) route 3 (three) times daily. 100 each 11    blood-glucose meter kit Use as instructed 1 each 0    cholecalciferol, vitamin D3, (VITAMIN D3) 50 mcg (2,000 unit) Tab Take 2,000 Units by mouth once daily.      CREON 36,000-114,000- 180,000 unit CpDR Take by mouth. As directed      cyanocobalamin (VITAMIN B-12) 1000 MCG tablet Take 1,000 mcg by mouth once daily.      insulin detemir U-100 (LEVEMIR FLEXTOUCH) 100 unit/mL (3 mL) SubQ InPn pen Inject 4 Units into the skin every evening.      ketoconazole (NIZORAL) 2 % shampoo Apply topically twice a week.      lancets Misc 1 each by Misc.(Non-Drug; Combo Route) route 3 (three) times daily. 100 each 11    metronidazole 1% (METROGEL) 1 % Gel Apply topically daily as needed.      multivitamin Tab Take 1 tablet by mouth once daily.      mycophenolate (MYFORTIC) 180 MG TbEC Take 4 tablets (720 mg total) by mouth 2 (two) times daily. 240 tablet 11    NIFEdipine (PROCARDIA-XL) 30 MG (OSM) 24 hr tablet Take 1 tablet (30 mg total) by mouth once daily. 30 tablet 11    pantoprazole (PROTONIX) 40 MG tablet Take 1 tablet (40 mg total) by mouth once daily.  30 tablet 11    predniSONE (DELTASONE) 5 MG tablet Take 1 tablet (5 mg total) by mouth once daily. 93 tablet 3    rivaroxaban (XARELTO DVT-PE TREAT 30D START) 15 mg (42)- 20 mg (9) tablet dose pack Take 1 tablet (15 mg) by mouth twice daily with food for 21 days followed by 1 tablet (20 mg) by mouth once daily with food      sodium bicarbonate 650 MG tablet Take 2 tablets (1,300 mg total) by mouth 2 (two) times daily. (Patient taking differently: Take 1,300 mg by mouth 2 (two) times daily. Taking 3 tablets 2 times daily) 120 tablet 11    tacrolimus (PROGRAF) 1 MG Cap Take 3 capsules (3 mg total) by mouth every 12 (twelve) hours. 180 capsule 11    valsartan (DIOVAN) 80 MG tablet Take 1 tablet (80 mg total) by mouth once daily. (Patient taking differently: Take 80 mg by mouth every evening.) 90 tablet 3     No current facility-administered medications for this visit.       Education regarding Belatacept completed, including risks/benefits of conversion:     Benefits of improved renal function post-conversion seen in clinical trials (Pallavi, et al.)  Risk of conversion may include increased risk of rejection, especially in the immediate post-conversion period  There is a higher risk of PTLD seen in patients on belatacept, particularly those recipients who are EBV IgG negative  This immunosuppressant increased infectious risk, similar to other medications in this class  Infusions are generally well tolerated, headache is the most common side effect  Infusions are initially every OTHER week for 5 weeks, then every 28 days (+/- 3 days).  Patients must be able to commit to lifelong infusions  If traveling, infusion schedules must be taken into account OR arrangements for an alternative infusion site should be made  Cost of this medication may be significant, and cost may preclude conversion.  Insurance should remain the same once on this medication, any changes in insurance could lead to discontinuation of medication or  issues with cost of medication.  Notifying the coordinator is vital with any changes in insurance once on this therapy.       Assessment/Plan:    1) Current Immunosuppression: tacrolimus, mmf, and prednisone  Reason for conversion: CNI toxicity     2) EBV IgG status: positive      Quantiferon Gold Status: negative     3) [employer] Distribution Program: [facility] #[id number]    4) Benefits verification: [employer] Access Support OR Biomatrix? none  Copay Estimate: $0     5) Scheduling: once conversion is deemed a feasible option, will work with [facility] to find an infusion center near patient.  If utilizing [facility] facility, enter therapy plan. done    6) Immunosuppression Plan:     Location/Infusion Provider: [facility]    Day 1 (10/2/23) belatacept 5 mg/kg, continue tacrolimus and mycophenolate per protocol (continue tacrolimus 3/3)  Day 14 (10/16/23)       belatacept 5 mg/kg, continue mycophenolate per protocol, decrease tacrolimus to tacrolimus 2/2  Day 28 (10/30/23)       belatacept 5 mg/kg, continue mycophenolate per protocol, decrease tacrolimus to tacrolimus 1/1  Day 42 (anticipated 11/13/23)       belatacept 5 mg/kg, continue mycophenolate per protocol, continue tacrolimus 1/1  Day 56 (anticipated 11/27/23)       belatacept 5 mg/kg, continue mycophenolate per protocol, DISCONTINUE tacrolimus   Day 84 (anticipated 12/25/23)       belatacept 5 mg/kg EVERY 28 DAYS, continue mycophenolate per protocol    Antwon and his caregivers verbalized understanding and had the opportunity to ask questions.

## 2023-10-02 ENCOUNTER — INFUSION (OUTPATIENT)
Dept: INFUSION THERAPY | Facility: HOSPITAL | Age: 58
End: 2023-10-02
Attending: INTERNAL MEDICINE
Payer: MEDICARE

## 2023-10-02 VITALS
SYSTOLIC BLOOD PRESSURE: 156 MMHG | HEART RATE: 70 BPM | BODY MASS INDEX: 27.76 KG/M2 | DIASTOLIC BLOOD PRESSURE: 82 MMHG | WEIGHT: 204.94 LBS | TEMPERATURE: 98 F | RESPIRATION RATE: 19 BRPM | HEIGHT: 72 IN

## 2023-10-02 DIAGNOSIS — Z94.0 S/P KIDNEY TRANSPLANT: Primary | ICD-10-CM

## 2023-10-02 PROCEDURE — 63600175 PHARM REV CODE 636 W HCPCS: Mod: JZ,JG,UD | Performed by: INTERNAL MEDICINE

## 2023-10-02 PROCEDURE — 96413 CHEMO IV INFUSION 1 HR: CPT

## 2023-10-02 PROCEDURE — 25000003 PHARM REV CODE 250: Performed by: INTERNAL MEDICINE

## 2023-10-02 PROCEDURE — 96365 THER/PROPH/DIAG IV INF INIT: CPT

## 2023-10-02 RX ORDER — HEPARIN 100 UNIT/ML
500 SYRINGE INTRAVENOUS
Status: CANCELLED | OUTPATIENT
Start: 2023-10-16

## 2023-10-02 RX ORDER — DIPHENHYDRAMINE HYDROCHLORIDE 50 MG/ML
50 INJECTION INTRAMUSCULAR; INTRAVENOUS ONCE AS NEEDED
Status: CANCELLED | OUTPATIENT
Start: 2023-10-16

## 2023-10-02 RX ORDER — HEPARIN 100 UNIT/ML
500 SYRINGE INTRAVENOUS
Status: DISCONTINUED | OUTPATIENT
Start: 2023-10-02 | End: 2023-10-02 | Stop reason: HOSPADM

## 2023-10-02 RX ORDER — DIPHENHYDRAMINE HYDROCHLORIDE 50 MG/ML
50 INJECTION INTRAMUSCULAR; INTRAVENOUS ONCE AS NEEDED
Status: DISCONTINUED | OUTPATIENT
Start: 2023-10-02 | End: 2023-10-02 | Stop reason: HOSPADM

## 2023-10-02 RX ORDER — EPINEPHRINE 0.3 MG/.3ML
0.3 INJECTION SUBCUTANEOUS ONCE AS NEEDED
Status: CANCELLED | OUTPATIENT
Start: 2023-10-16

## 2023-10-02 RX ORDER — EPINEPHRINE 0.3 MG/.3ML
0.3 INJECTION SUBCUTANEOUS ONCE AS NEEDED
Status: DISCONTINUED | OUTPATIENT
Start: 2023-10-02 | End: 2023-10-02 | Stop reason: HOSPADM

## 2023-10-02 RX ADMIN — DEXTROSE MONOHYDRATE: 50 INJECTION, SOLUTION INTRAVENOUS at 08:10

## 2023-10-02 RX ADMIN — DEXTROSE MONOHYDRATE 462.5 MG: 50 INJECTION, SOLUTION INTRAVENOUS at 09:10

## 2023-10-02 NOTE — PLAN OF CARE
Pleasant alert and oriented patient ambulated independently to clinic for Belatacept - pt tolerated well - pt to RTC in 2 weeks.

## 2023-10-06 DIAGNOSIS — Z94.0 KIDNEY REPLACED BY TRANSPLANT: Primary | ICD-10-CM

## 2023-10-06 RX ORDER — VALSARTAN 80 MG/1
80 TABLET ORAL DAILY
Qty: 90 TABLET | Refills: 0 | Status: SHIPPED | OUTPATIENT
Start: 2023-10-06 | End: 2024-10-05

## 2023-10-12 ENCOUNTER — PATIENT MESSAGE (OUTPATIENT)
Dept: TRANSPLANT | Facility: CLINIC | Age: 58
End: 2023-10-12
Payer: MEDICARE

## 2023-10-16 ENCOUNTER — INFUSION (OUTPATIENT)
Dept: INFUSION THERAPY | Facility: HOSPITAL | Age: 58
End: 2023-10-16
Attending: INTERNAL MEDICINE
Payer: MEDICARE

## 2023-10-16 VITALS
HEART RATE: 78 BPM | DIASTOLIC BLOOD PRESSURE: 75 MMHG | SYSTOLIC BLOOD PRESSURE: 152 MMHG | RESPIRATION RATE: 18 BRPM | HEIGHT: 72 IN | OXYGEN SATURATION: 97 % | WEIGHT: 204.94 LBS | BODY MASS INDEX: 27.76 KG/M2

## 2023-10-16 DIAGNOSIS — Z94.0 S/P KIDNEY TRANSPLANT: Primary | ICD-10-CM

## 2023-10-16 PROCEDURE — 63600175 PHARM REV CODE 636 W HCPCS: Mod: JZ,JG,UD | Performed by: INTERNAL MEDICINE

## 2023-10-16 PROCEDURE — 25000003 PHARM REV CODE 250: Performed by: INTERNAL MEDICINE

## 2023-10-16 PROCEDURE — 96365 THER/PROPH/DIAG IV INF INIT: CPT

## 2023-10-16 RX ORDER — DIPHENHYDRAMINE HYDROCHLORIDE 50 MG/ML
50 INJECTION INTRAMUSCULAR; INTRAVENOUS ONCE AS NEEDED
Status: DISCONTINUED | OUTPATIENT
Start: 2023-10-16 | End: 2023-10-16 | Stop reason: HOSPADM

## 2023-10-16 RX ORDER — HEPARIN 100 UNIT/ML
500 SYRINGE INTRAVENOUS
Status: CANCELLED | OUTPATIENT
Start: 2023-10-30

## 2023-10-16 RX ORDER — EPINEPHRINE 0.3 MG/.3ML
0.3 INJECTION SUBCUTANEOUS ONCE AS NEEDED
Status: CANCELLED | OUTPATIENT
Start: 2023-10-30

## 2023-10-16 RX ORDER — EPINEPHRINE 0.3 MG/.3ML
0.3 INJECTION SUBCUTANEOUS ONCE AS NEEDED
Status: DISCONTINUED | OUTPATIENT
Start: 2023-10-16 | End: 2023-10-16 | Stop reason: HOSPADM

## 2023-10-16 RX ORDER — HEPARIN 100 UNIT/ML
500 SYRINGE INTRAVENOUS
Status: DISCONTINUED | OUTPATIENT
Start: 2023-10-16 | End: 2023-10-16 | Stop reason: HOSPADM

## 2023-10-16 RX ORDER — DIPHENHYDRAMINE HYDROCHLORIDE 50 MG/ML
50 INJECTION INTRAMUSCULAR; INTRAVENOUS ONCE AS NEEDED
Status: CANCELLED | OUTPATIENT
Start: 2023-10-30

## 2023-10-16 RX ADMIN — DEXTROSE MONOHYDRATE 462.5 MG: 50 INJECTION, SOLUTION INTRAVENOUS at 09:10

## 2023-10-16 RX ADMIN — DEXTROSE MONOHYDRATE: 50 INJECTION, SOLUTION INTRAVENOUS at 09:10

## 2023-10-16 NOTE — NURSING
PT tolerated Belatacept infusion well. No adverse reaction noted. Pt education reinforced on Belatacept side effects, what to expect and when to call Dr. Guerline Navarro [place]. Pt verbalized understanding. PIV d/c per protocol, pt tolerated well. amr

## 2023-10-16 NOTE — PLAN OF CARE
Problem: Adult Inpatient Plan of Care  Goal: Optimal Comfort and Wellbeing  Outcome: Ongoing, Progressing  Intervention: Provide Person-Centered Care  Flowsheets (Taken 10/16/2023 0233)  Trust Relationship/Rapport:   care explained   questions encouraged   choices provided   emotional support provided   empathic listening provided   thoughts/feelings acknowledged   reassurance provided   questions answered

## 2023-10-18 ENCOUNTER — DOCUMENTATION ONLY (OUTPATIENT)
Dept: TRANSPLANT | Facility: CLINIC | Age: 58
End: 2023-10-18

## 2023-10-18 LAB
EXT BUN: 24 (ref 6–20)
EXT CALCIUM: 8.9 (ref 8.5–10.5)
EXT CHLORIDE: 111 (ref 101–112)
EXT CO2: 20 (ref 18–32)
EXT CREATININE: 1.43 MG/DL (ref 0.6–1.2)
EXT EOSINOPHIL%: 0.1 (ref 0–0.7)
EXT GLUCOSE: 99 (ref 74–106)
EXT HEMATOCRIT: 48.2 (ref 43.5–53.7)
EXT HEMOGLOBIN: 15.2 (ref 14.1–18.1)
EXT LYMPH%: 3.8 (ref 1.2–3.4)
EXT MONOCYTES%: 0.9 (ref 0.1–0.6)
EXT PLATELETS: 264 (ref 142–424)
EXT POTASSIUM: 3.9 (ref 3.4–5.1)
EXT PROTEIN TOTAL: 7
EXT SODIUM: 139 MMOL/L (ref 135–145)
EXT WBC: 9.9 (ref 4.6–10.2)

## 2023-10-30 ENCOUNTER — INFUSION (OUTPATIENT)
Dept: INFUSION THERAPY | Facility: HOSPITAL | Age: 58
End: 2023-10-30
Attending: INTERNAL MEDICINE
Payer: MEDICARE

## 2023-10-30 VITALS
SYSTOLIC BLOOD PRESSURE: 159 MMHG | WEIGHT: 207.69 LBS | DIASTOLIC BLOOD PRESSURE: 71 MMHG | RESPIRATION RATE: 18 BRPM | BODY MASS INDEX: 28.13 KG/M2 | OXYGEN SATURATION: 98 % | HEART RATE: 63 BPM | TEMPERATURE: 99 F | HEIGHT: 72 IN

## 2023-10-30 DIAGNOSIS — Z94.0 S/P KIDNEY TRANSPLANT: Primary | ICD-10-CM

## 2023-10-30 PROCEDURE — 96365 THER/PROPH/DIAG IV INF INIT: CPT

## 2023-10-30 PROCEDURE — 96413 CHEMO IV INFUSION 1 HR: CPT

## 2023-10-30 PROCEDURE — 25000003 PHARM REV CODE 250: Performed by: INTERNAL MEDICINE

## 2023-10-30 PROCEDURE — 63600175 PHARM REV CODE 636 W HCPCS: Mod: JZ,JG,UD | Performed by: INTERNAL MEDICINE

## 2023-10-30 RX ORDER — HEPARIN 100 UNIT/ML
500 SYRINGE INTRAVENOUS
Status: CANCELLED | OUTPATIENT
Start: 2023-11-13

## 2023-10-30 RX ORDER — EPINEPHRINE 0.3 MG/.3ML
0.3 INJECTION SUBCUTANEOUS ONCE AS NEEDED
Status: DISCONTINUED | OUTPATIENT
Start: 2023-10-30 | End: 2023-10-30 | Stop reason: HOSPADM

## 2023-10-30 RX ORDER — DIPHENHYDRAMINE HYDROCHLORIDE 50 MG/ML
50 INJECTION INTRAMUSCULAR; INTRAVENOUS ONCE AS NEEDED
Status: CANCELLED | OUTPATIENT
Start: 2023-11-13

## 2023-10-30 RX ORDER — HEPARIN 100 UNIT/ML
500 SYRINGE INTRAVENOUS
Status: DISCONTINUED | OUTPATIENT
Start: 2023-10-30 | End: 2023-10-30 | Stop reason: HOSPADM

## 2023-10-30 RX ORDER — EPINEPHRINE 0.3 MG/.3ML
0.3 INJECTION SUBCUTANEOUS ONCE AS NEEDED
Status: CANCELLED | OUTPATIENT
Start: 2023-11-13

## 2023-10-30 RX ORDER — DIPHENHYDRAMINE HYDROCHLORIDE 50 MG/ML
50 INJECTION INTRAMUSCULAR; INTRAVENOUS ONCE AS NEEDED
Status: DISCONTINUED | OUTPATIENT
Start: 2023-10-30 | End: 2023-10-30 | Stop reason: HOSPADM

## 2023-10-30 RX ADMIN — DEXTROSE MONOHYDRATE 475 MG: 50 INJECTION, SOLUTION INTRAVENOUS at 09:10

## 2023-10-30 RX ADMIN — DEXTROSE MONOHYDRATE: 50 INJECTION, SOLUTION INTRAVENOUS at 08:10

## 2023-10-30 NOTE — PLAN OF CARE
Problem: Fatigue  Goal: Improved Activity Tolerance  Outcome: Ongoing, Progressing  Intervention: Promote Improved Energy  Flowsheets (Taken 10/30/2023 1031)  Fatigue Management:   activity schedule adjusted   activity assistance provided   fatigue-related activity identified   frequent rest breaks encouraged   paced activity encouraged  Sleep/Rest Enhancement:   awakenings minimized   consistent schedule promoted   family presence promoted   natural light exposure provided   noise level reduced   reading promoted   regular sleep/rest pattern promoted   relaxation techniques promoted  Activity Management:   Ambulated -L4   Up in chair - L3

## 2023-10-31 ENCOUNTER — DOCUMENTATION ONLY (OUTPATIENT)
Dept: TRANSPLANT | Facility: CLINIC | Age: 58
End: 2023-10-31
Payer: MEDICARE

## 2023-10-31 LAB
EXT ALBUMIN: 3.8 (ref 3.7–5.3)
EXT BUN: 22 (ref 6–20)
EXT CALCIUM: 8.9 (ref 8.5–10.5)
EXT CHLORIDE: 109 (ref 101–112)
EXT CO2: 22 (ref 18–32)
EXT CREATININE: 1.4 MG/DL (ref 0.6–1.2)
EXT GLUCOSE: 117 (ref 74–106)
EXT HEMATOCRIT: 48.7 (ref 43.5–53.7)
EXT HEMOGLOBIN: 15.5 (ref 14.1–18.1)
EXT PLATELETS: 268 (ref 142–424)
EXT POTASSIUM: 3.7 (ref 3.4–5.1)
EXT SODIUM: 139 MMOL/L (ref 135–145)
EXT WBC: 9.1 (ref 4.6–10.2)

## 2023-11-12 ENCOUNTER — PATIENT MESSAGE (OUTPATIENT)
Dept: TRANSPLANT | Facility: CLINIC | Age: 58
End: 2023-11-12
Payer: MEDICARE

## 2023-11-13 ENCOUNTER — INFUSION (OUTPATIENT)
Dept: INFUSION THERAPY | Facility: HOSPITAL | Age: 58
End: 2023-11-13
Attending: INTERNAL MEDICINE
Payer: MEDICARE

## 2023-11-13 VITALS
RESPIRATION RATE: 18 BRPM | SYSTOLIC BLOOD PRESSURE: 167 MMHG | TEMPERATURE: 99 F | BODY MASS INDEX: 28.4 KG/M2 | OXYGEN SATURATION: 99 % | HEIGHT: 72 IN | DIASTOLIC BLOOD PRESSURE: 77 MMHG | WEIGHT: 209.69 LBS | HEART RATE: 62 BPM

## 2023-11-13 DIAGNOSIS — Z94.0 S/P KIDNEY TRANSPLANT: Primary | ICD-10-CM

## 2023-11-13 PROCEDURE — 96365 THER/PROPH/DIAG IV INF INIT: CPT

## 2023-11-13 PROCEDURE — 25000003 PHARM REV CODE 250: Mod: UD | Performed by: INTERNAL MEDICINE

## 2023-11-13 PROCEDURE — 63600175 PHARM REV CODE 636 W HCPCS: Mod: JZ,JG,UD | Performed by: INTERNAL MEDICINE

## 2023-11-13 RX ORDER — DIPHENHYDRAMINE HYDROCHLORIDE 50 MG/ML
50 INJECTION INTRAMUSCULAR; INTRAVENOUS ONCE AS NEEDED
Status: CANCELLED | OUTPATIENT
Start: 2023-11-27

## 2023-11-13 RX ORDER — EPINEPHRINE 0.3 MG/.3ML
0.3 INJECTION SUBCUTANEOUS ONCE AS NEEDED
Status: CANCELLED | OUTPATIENT
Start: 2023-11-27

## 2023-11-13 RX ORDER — HEPARIN 100 UNIT/ML
500 SYRINGE INTRAVENOUS
Status: CANCELLED | OUTPATIENT
Start: 2023-11-27

## 2023-11-13 RX ADMIN — DEXTROSE MONOHYDRATE: 50 INJECTION, SOLUTION INTRAVENOUS at 09:11

## 2023-11-13 RX ADMIN — DEXTROSE MONOHYDRATE 475 MG: 50 INJECTION, SOLUTION INTRAVENOUS at 09:11

## 2023-11-13 NOTE — PLAN OF CARE
Problem: Fatigue  Goal: Improved Activity Tolerance  Outcome: Ongoing, Progressing  Intervention: Promote Improved Energy  Flowsheets (Taken 11/13/2023 1002)  Fatigue Management:   activity schedule adjusted   activity assistance provided   frequent rest breaks encouraged   paced activity encouraged  Sleep/Rest Enhancement:   awakenings minimized   consistent schedule promoted   natural light exposure provided   noise level reduced   reading promoted   regular sleep/rest pattern promoted   relaxation techniques promoted  Activity Management:   Ambulated -L4   Up in chair - L3  BP (!) 158/78 (BP Location: Right arm, Patient Position: Sitting)   Pulse 69   Temp 99.1 °F (37.3 °C)   Resp 18   Ht 6' (1.829 m)   Wt 95.1 kg (209 lb 10.5 oz)   SpO2 99%   BMI 28.43 kg/m² Pleasant, alert and oriented patient to OPT for Beltacept q2wk - PIV started and blood return observed - Belatacept administered and patient tolerated well - VSS, PIV discontinued with pressure dressing applied - patient discharged to home with no concerns - RTC in 2 weeks

## 2023-11-21 ENCOUNTER — DOCUMENTATION ONLY (OUTPATIENT)
Dept: TRANSPLANT | Facility: CLINIC | Age: 58
End: 2023-11-21
Payer: MEDICARE

## 2023-11-21 LAB
EXT ANC: 3.58 (ref 1.4–6.5)
EXT BUN: 26 (ref 6–20)
EXT CALCIUM: 8.9 (ref 8.5–10.5)
EXT CHLORIDE: 109 (ref 101–112)
EXT CO2: 23 (ref 18–32)
EXT CREATININE: 1.33 MG/DL (ref 0.6–1.2)
EXT GFR MDRD NON AF AMER: >60
EXT GLUCOSE: 99 (ref 74–106)
EXT HEMATOCRIT: 46.3 (ref 43.5–53.7)
EXT HEMOGLOBIN: 14.6 (ref 14.1–18.1)
EXT PLATELETS: 236 (ref 142–424)
EXT POTASSIUM: 4.2 (ref 3.4–5.1)
EXT PROTEIN TOTAL: 7 (ref 6.4–8.3)
EXT SODIUM: 138 MMOL/L (ref 135–145)
EXT WBC: 7.4 (ref 4.6–10.2)

## 2023-11-27 ENCOUNTER — INFUSION (OUTPATIENT)
Dept: INFUSION THERAPY | Facility: HOSPITAL | Age: 58
End: 2023-11-27
Attending: INTERNAL MEDICINE
Payer: MEDICARE

## 2023-11-27 VITALS
HEIGHT: 72 IN | TEMPERATURE: 98 F | SYSTOLIC BLOOD PRESSURE: 138 MMHG | OXYGEN SATURATION: 100 % | HEART RATE: 68 BPM | WEIGHT: 208.88 LBS | RESPIRATION RATE: 18 BRPM | DIASTOLIC BLOOD PRESSURE: 69 MMHG | BODY MASS INDEX: 28.29 KG/M2

## 2023-11-27 DIAGNOSIS — Z94.0 S/P KIDNEY TRANSPLANT: Primary | ICD-10-CM

## 2023-11-27 PROCEDURE — 96365 THER/PROPH/DIAG IV INF INIT: CPT

## 2023-11-27 PROCEDURE — 63600175 PHARM REV CODE 636 W HCPCS: Mod: JZ,JG,UD | Performed by: INTERNAL MEDICINE

## 2023-11-27 PROCEDURE — 25000003 PHARM REV CODE 250: Mod: UD | Performed by: INTERNAL MEDICINE

## 2023-11-27 RX ORDER — EPINEPHRINE 0.3 MG/.3ML
0.3 INJECTION SUBCUTANEOUS ONCE AS NEEDED
Status: CANCELLED | OUTPATIENT
Start: 2023-12-25

## 2023-11-27 RX ORDER — HEPARIN 100 UNIT/ML
500 SYRINGE INTRAVENOUS
Status: CANCELLED | OUTPATIENT
Start: 2023-12-25

## 2023-11-27 RX ORDER — DIPHENHYDRAMINE HYDROCHLORIDE 50 MG/ML
50 INJECTION INTRAMUSCULAR; INTRAVENOUS ONCE AS NEEDED
Status: CANCELLED | OUTPATIENT
Start: 2023-12-25

## 2023-11-27 RX ADMIN — DEXTROSE MONOHYDRATE: 50 INJECTION, SOLUTION INTRAVENOUS at 08:11

## 2023-11-27 RX ADMIN — DEXTROSE MONOHYDRATE 475 MG: 50 INJECTION, SOLUTION INTRAVENOUS at 09:11

## 2023-11-27 NOTE — PLAN OF CARE
Problem: Fatigue  Goal: Improved Activity Tolerance  Outcome: Ongoing, Progressing  Intervention: Promote Improved Energy  Flowsheets (Taken 11/27/2023 0848)  Fatigue Management:   activity schedule adjusted   activity assistance provided   frequent rest breaks encouraged   fatigue-related activity identified   paced activity encouraged  Sleep/Rest Enhancement:   awakenings minimized   consistent schedule promoted   family presence promoted   natural light exposure provided   noise level reduced   reading promoted   regular sleep/rest pattern promoted   relaxation techniques promoted   room darkened  Activity Management:   Ambulated -L4   Up in chair - L3  BP (!) 162/74 (Patient Position: Sitting)   Pulse 72   Temp 98.3 °F (36.8 °C)   Resp 18   Ht 6' (1.829 m)   Wt 94.7 kg (208 lb 14.2 oz)   SpO2 100%   BMI 28.33 kg/m² Pleasant, alert and oriented patient to OPT for Beltacept q2wk - PIV started and blood return observed - Belatacept administered and patient tolerated well - VSS, PIV discontinued with pressure dressing applied - patient discharged to home with no concerns - RTC in 1 mo.

## 2023-12-05 DIAGNOSIS — Z94.0 S/P KIDNEY TRANSPLANT: ICD-10-CM

## 2023-12-05 DIAGNOSIS — Z79.60 LONG-TERM USE OF IMMUNOSUPPRESSANT MEDICATION: ICD-10-CM

## 2023-12-05 RX ORDER — MYCOPHENOLIC ACID 180 MG/1
720 TABLET, DELAYED RELEASE ORAL 2 TIMES DAILY
Qty: 240 TABLET | Refills: 11 | Status: SHIPPED | OUTPATIENT
Start: 2023-12-05 | End: 2024-12-04

## 2023-12-12 ENCOUNTER — PATIENT MESSAGE (OUTPATIENT)
Dept: TRANSPLANT | Facility: CLINIC | Age: 58
End: 2023-12-12
Payer: MEDICARE

## 2023-12-21 ENCOUNTER — INFUSION (OUTPATIENT)
Dept: INFUSION THERAPY | Facility: HOSPITAL | Age: 58
End: 2023-12-21
Attending: INTERNAL MEDICINE
Payer: MEDICARE

## 2023-12-21 VITALS
HEIGHT: 72 IN | HEART RATE: 73 BPM | SYSTOLIC BLOOD PRESSURE: 147 MMHG | TEMPERATURE: 96 F | RESPIRATION RATE: 18 BRPM | DIASTOLIC BLOOD PRESSURE: 70 MMHG | OXYGEN SATURATION: 99 % | BODY MASS INDEX: 29.21 KG/M2 | WEIGHT: 215.63 LBS

## 2023-12-21 DIAGNOSIS — Z94.0 S/P KIDNEY TRANSPLANT: Primary | ICD-10-CM

## 2023-12-21 PROCEDURE — 96365 THER/PROPH/DIAG IV INF INIT: CPT

## 2023-12-21 PROCEDURE — 25000003 PHARM REV CODE 250: Mod: UD | Performed by: INTERNAL MEDICINE

## 2023-12-21 PROCEDURE — 63600175 PHARM REV CODE 636 W HCPCS: Mod: JZ,JG,UD | Performed by: INTERNAL MEDICINE

## 2023-12-21 RX ORDER — HEPARIN 100 UNIT/ML
500 SYRINGE INTRAVENOUS
Status: CANCELLED | OUTPATIENT
Start: 2023-12-25

## 2023-12-21 RX ORDER — DIPHENHYDRAMINE HYDROCHLORIDE 50 MG/ML
50 INJECTION INTRAMUSCULAR; INTRAVENOUS ONCE AS NEEDED
Status: CANCELLED | OUTPATIENT
Start: 2023-12-25

## 2023-12-21 RX ORDER — EPINEPHRINE 0.3 MG/.3ML
0.3 INJECTION SUBCUTANEOUS ONCE AS NEEDED
Status: CANCELLED | OUTPATIENT
Start: 2023-12-25

## 2023-12-21 RX ORDER — DIPHENHYDRAMINE HYDROCHLORIDE 50 MG/ML
50 INJECTION INTRAMUSCULAR; INTRAVENOUS ONCE AS NEEDED
Status: DISCONTINUED | OUTPATIENT
Start: 2023-12-21 | End: 2023-12-21 | Stop reason: HOSPADM

## 2023-12-21 RX ORDER — EPINEPHRINE 0.3 MG/.3ML
0.3 INJECTION SUBCUTANEOUS ONCE AS NEEDED
Status: DISCONTINUED | OUTPATIENT
Start: 2023-12-21 | End: 2023-12-21 | Stop reason: HOSPADM

## 2023-12-21 RX ADMIN — DEXTROSE MONOHYDRATE: 50 INJECTION, SOLUTION INTRAVENOUS at 09:12

## 2023-12-21 RX ADMIN — DEXTROSE MONOHYDRATE 487.5 MG: 50 INJECTION, SOLUTION INTRAVENOUS at 09:12

## 2023-12-21 NOTE — PLAN OF CARE
Problem: Adult Inpatient Plan of Care  Goal: Optimal Comfort and Wellbeing  Outcome: Ongoing, Progressing  Intervention: Provide Person-Centered Care  Flowsheets (Taken 12/21/2023 0819)  Trust Relationship/Rapport:   care explained   choices provided   emotional support provided   empathic listening provided   questions answered   questions encouraged   reassurance provided   thoughts/feelings acknowledged

## 2023-12-26 NOTE — PROGRESS NOTES
Kidney Post-Transplant Assessment    Referring Physician: Héctor Calvillo  Current Nephrologist: Héctor Calvillo    ORGAN: RIGHT KIDNEY  Donor Type: donation after brain death  PHS Increased Risk: no  Cold Ischemia: 1,221 mins  Induction Medications: simulect - basiliximab    Subjective:     CC:  Reassessment of renal allograft function and management of chronic immunosuppression.    HPI:  Mr. Valdez is a 58 y.o. 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 His  baseline creatinine was ~1-1.3  in 2022, then bryan to 1.7.  He takes mycophenolate mofetil, prednisone and tacrolimus for maintenance immunosuppression. His post transplant course has been uncomplicated to date.    Transplant History:  -ESRD secondary to DM.   -on PD until DBD KTX 12/25/21 (Simulect induction, CIT 20h 21m, KDPI 65%, CMV D+/R+).   -Per op note, small mass excised for biopsy at donor site and determined to be benign, but excision site left a 1.5-2cm wide superficial defect that caused expected bleeding after reperfusion that was unable to be sutured due to shape and risk of tearing parenchyma, therefore, evarrest patch applied with complete hemostasis     Pertinent HX:   gastric bypass c/b severe reflux, treated with esoph dilation  DM 1995  PE, incidentally found large PE 12/2020 treated x 3 mos [4 mos after bariatric and foot surgery, found during w/u for SOB]  4/2022 LE DVT --> indefinite xalrelto  Sleep apnea  Hypotension on midodrine since gastric bypass  DKT 12/25/21; CIT 20+h; KDPI 65%;  1/28/22 high ALT- bactrim d/c'd    Interval History 12/27/23:   Reports doing well. No complaints. Had labs today awaiting to receive outside labs. Did show me on his phone via protocol with Cr 1.29. Receiving his Patricia monthly. Has stopped Tac a few weeks ago.   Intake 2L +  UOP-no problems reported   Peripheral edema-none  Appetite-good  Lab /diagnostic results reviewed with patient today.     Following with his local nephrologist        Current Outpatient Medications   Medication Sig    BELatacept 250 mg SolR Inject 5mgkg every 2 weeks for 5 doses, then every 4 weeks +/- 3 days    blood sugar diagnostic (ACCU-CHEK GUIDE TEST STRIPS MISC) 1 each by abdominal subcutaneous route 3 (three) times daily.    blood sugar diagnostic Strp 1 each by Misc.(Non-Drug; Combo Route) route 3 (three) times daily.    blood-glucose meter kit Use as instructed    cholecalciferol, vitamin D3, (VITAMIN D3) 50 mcg (2,000 unit) Tab Take 2,000 Units by mouth once daily.    cyanocobalamin (VITAMIN B-12) 1000 MCG tablet Take 1,000 mcg by mouth once daily.    insulin detemir U-100 (LEVEMIR FLEXTOUCH) 100 unit/mL (3 mL) SubQ InPn pen Inject 4 Units into the skin every evening.    lancets Misc 1 each by Misc.(Non-Drug; Combo Route) route 3 (three) times daily.    multivitamin Tab Take 1 tablet by mouth once daily.    mycophenolate sodium 180 MG TbEC Take 4 tablets (720 mg total) by mouth 2 (two) times daily.    predniSONE (DELTASONE) 5 MG tablet Take 1 tablet (5 mg total) by mouth once daily.    rivaroxaban (XARELTO DVT-PE TREAT 30D START) 15 mg (42)- 20 mg (9) tablet dose pack Take 1 tablet (15 mg) by mouth twice daily with food for 21 days followed by 1 tablet (20 mg) by mouth once daily with food    valsartan (DIOVAN) 80 MG tablet Take 1 tablet (80 mg total) by mouth once daily.    azelastine (OPTIVAR) 0.05 % ophthalmic solution Place 1 drop into both eyes 2 (two) times daily.    benzoyl peroxide (BENZAC AC) 10 % external wash wash affected areas once or twice daily    CREON 36,000-114,000- 180,000 unit CpDR Take by mouth. As directed    ketoconazole (NIZORAL) 2 % shampoo Apply topically twice a week.    metronidazole 1% (METROGEL) 1 % Gel Apply topically daily as needed.    NIFEdipine (PROCARDIA-XL) 30 MG (OSM) 24 hr tablet Take 1 tablet (30 mg total) by mouth once daily.    pantoprazole (PROTONIX) 40 MG tablet Take 1 tablet (40  mg total) by mouth once daily.    sodium bicarbonate 650 MG tablet Take 2 tablets (1,300 mg total) by mouth 2 (two) times daily. (Patient taking differently: Take 1,300 mg by mouth 2 (two) times daily. Taking 3 tablets 2 times daily)     No current facility-administered medications for this visit.       Past Medical History:   Diagnosis Date    Chronic pulmonary embolism 6/1/2021    Diabetes mellitus     Diabetic nephropathy associated with type 2 diabetes mellitus 6/1/2021    Disorder of kidney and ureter     ESRD (end stage renal disease) 6/1/2021    Essential hypertension 6/1/2021    GERD (gastroesophageal reflux disease)     Mixed hyperlipidemia 6/1/2021    Sleep apnea 6/1/2021       Review of Systems   Constitutional: Negative.    Eyes:  Positive for visual disturbance.        Wears glasses   Respiratory:  Negative for shortness of breath.    Cardiovascular:  Negative for chest pain and leg swelling.   Genitourinary:  Negative for difficulty urinating and frequency.   Musculoskeletal:  Negative for gait problem.   Allergic/Immunologic: Positive for immunocompromised state.   Neurological:  Negative for weakness.       Objective:   Blood pressure 138/69, pulse 70, temperature 97.3 °F (36.3 °C), temperature source Skin, resp. rate 18, height 6' (1.829 m), weight 94.9 kg (209 lb 3.5 oz), SpO2 98 %.body mass index is 28.37 kg/m².    Physical Exam  Constitutional:       Appearance: Normal appearance.   Cardiovascular:      Rate and Rhythm: Normal rate and regular rhythm.   Pulmonary:      Effort: Pulmonary effort is normal.      Breath sounds: Normal breath sounds.   Abdominal:      Palpations: Abdomen is soft. There is no mass.      Tenderness: There is no abdominal tenderness.   Musculoskeletal:      Right lower leg: No edema.      Left lower leg: No edema.         Labs:  Lab Results   Component Value Date    WBC 10.92 01/13/2023    HGB 13.6 (L) 01/13/2023    HCT 43.1 01/13/2023     01/13/2023    K 3.9  2023     (H) 2023    CO2 17 (L) 2023    BUN 24 (H) 2023    CREATININE 1.4 2023    EGFRNONAA >60.0 2022    CALCIUM 9.5 2023    PHOS 3.1 2023    MG 1.6 2023    ALBUMIN 3.7 2023    ALBUMIN 3.7 2023    AST 18 2023    ALT 32 2023    UTPCR Unable to calculate 2022    PTH 79.1 (H) 2023    TACROLIMUS 7.7 2023       Lab Results   Component Value Date    EXTANC 3.58 2023    EXTWBC 7.4 2023    EXTSEGS 61.8 2023    EXTPLATELETS 236 2023    EXTHEMOGLOBI 14.6 2023    EXTHEMATOCRI 46.3 2023    EXTCREATININ 1.33 (A) 2023    EXTSODIUM 138 2023    EXTPOTASSIUM 4.2 2023    EXTBUN 26 (A) 2023    EXTCO2 23 2023    EXTCALCIUM 8.9 2023    EXTPHOSPHORU 3.5 2022    EXTGLUCOSE 99 2023    EXTALBUMIN 3.8 10/27/2023    EXTAST 22 2022    EXTALT 38 2022    EXTBILITOTAL 0.7 2022       Lab Results   Component Value Date    EXTTACROLVL 6.3 2023    EXTPROTCRE 0.28 2023    EXTPTHINTACT 171.1 (A) 2022    EXTPROTEINUA 30 (A) 2023    EXTWBCUA 1-4 (A) 2023    EXTRBCUA rare 2023       Labs were reviewed with the patient    Assessment:     1. S/P kidney transplant    2. Essential hypertension    3. Type 2 diabetes mellitus with chronic kidney disease, with long-term current use of insulin, unspecified CKD stage    4. Long-term use of immunosuppressant medication          Plan:   -on jose follow-up and labs as protocol      CKD II-IIIa post  donor kidney transplant 2021  -creatinine had risen 1.7, has had issues with diarrhea off and on.  Now back down to baseline of 1.3-1.4  -negligible proteinuria noted  -continue monitoring as per transplant guideline.    -I emphasized importance of ongoing follow-up with home nephrologist as our visit stretch out will eventually become p.r.n.    Immunosuppression  management for patient on chronic prophylactic immunosuppression  -Continue MYF, prednisone, Patricia  -continue monitoring for side effects and toxicity, nothing new identified today. 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  Continue screening for BK as per guideline the patient at risk nephropathy graft failure if develops unchecked bk infection      Follow-up:   Clinic: return to transplant clinic weekly for the first month after transplant; every 2 weeks during months 2-3; then at 6-, 9-, 12-, 18-, 24-, and 36- months post-transplant to reassess for complications from immunosuppression toxicity and monitor for rejection.  Annually thereafter.    Labs: since patient remains at high risk for rejection and drug-related complications that warrant close monitoring, labs will be ordered as follows: continue twice weekly CBC, renal panel, and drug level for first month; then same labs once weekly through 3rd month post-transplant.  Urine for UA and protein/creatinine ratio monthly.  Serum BK - PCR at 1-, 3-, 6-, 9-, 12-, 18-, 24-, 36-, 48-, and 60 months post-transplant.  Hepatic panel at 1-, 2-, 3-, 6-, 9-, 12-, 18-, 24-, and 36- months post-transplant.    Marva Juan NP

## 2023-12-27 ENCOUNTER — OFFICE VISIT (OUTPATIENT)
Dept: TRANSPLANT | Facility: CLINIC | Age: 58
End: 2023-12-27
Payer: MEDICARE

## 2023-12-27 VITALS
BODY MASS INDEX: 28.33 KG/M2 | HEART RATE: 70 BPM | TEMPERATURE: 97 F | HEIGHT: 72 IN | OXYGEN SATURATION: 98 % | WEIGHT: 209.19 LBS | DIASTOLIC BLOOD PRESSURE: 69 MMHG | RESPIRATION RATE: 18 BRPM | SYSTOLIC BLOOD PRESSURE: 138 MMHG

## 2023-12-27 DIAGNOSIS — Z79.60 LONG-TERM USE OF IMMUNOSUPPRESSANT MEDICATION: ICD-10-CM

## 2023-12-27 DIAGNOSIS — E11.22 TYPE 2 DIABETES MELLITUS WITH CHRONIC KIDNEY DISEASE, WITH LONG-TERM CURRENT USE OF INSULIN, UNSPECIFIED CKD STAGE: ICD-10-CM

## 2023-12-27 DIAGNOSIS — Z94.0 S/P KIDNEY TRANSPLANT: Primary | ICD-10-CM

## 2023-12-27 DIAGNOSIS — I10 ESSENTIAL HYPERTENSION: ICD-10-CM

## 2023-12-27 DIAGNOSIS — Z79.4 TYPE 2 DIABETES MELLITUS WITH CHRONIC KIDNEY DISEASE, WITH LONG-TERM CURRENT USE OF INSULIN, UNSPECIFIED CKD STAGE: ICD-10-CM

## 2023-12-27 PROCEDURE — 3075F SYST BP GE 130 - 139MM HG: CPT | Mod: CPTII,S$GLB,, | Performed by: NURSE PRACTITIONER

## 2023-12-27 PROCEDURE — 99999 PR PBB SHADOW E&M-EST. PATIENT-LVL IV: ICD-10-PCS | Mod: PBBFAC,,, | Performed by: NURSE PRACTITIONER

## 2023-12-27 PROCEDURE — 1159F MED LIST DOCD IN RCRD: CPT | Mod: CPTII,S$GLB,, | Performed by: NURSE PRACTITIONER

## 2023-12-27 PROCEDURE — 1159F PR MEDICATION LIST DOCUMENTED IN MEDICAL RECORD: ICD-10-PCS | Mod: CPTII,S$GLB,, | Performed by: NURSE PRACTITIONER

## 2023-12-27 PROCEDURE — 4010F ACE/ARB THERAPY RXD/TAKEN: CPT | Mod: CPTII,S$GLB,, | Performed by: NURSE PRACTITIONER

## 2023-12-27 PROCEDURE — 3078F PR MOST RECENT DIASTOLIC BLOOD PRESSURE < 80 MM HG: ICD-10-PCS | Mod: CPTII,S$GLB,, | Performed by: NURSE PRACTITIONER

## 2023-12-27 PROCEDURE — 4010F PR ACE/ARB THEARPY RXD/TAKEN: ICD-10-PCS | Mod: CPTII,S$GLB,, | Performed by: NURSE PRACTITIONER

## 2023-12-27 PROCEDURE — 3078F DIAST BP <80 MM HG: CPT | Mod: CPTII,S$GLB,, | Performed by: NURSE PRACTITIONER

## 2023-12-27 PROCEDURE — 3066F NEPHROPATHY DOC TX: CPT | Mod: CPTII,S$GLB,, | Performed by: NURSE PRACTITIONER

## 2023-12-27 PROCEDURE — 3008F BODY MASS INDEX DOCD: CPT | Mod: CPTII,S$GLB,, | Performed by: NURSE PRACTITIONER

## 2023-12-27 PROCEDURE — 3066F PR DOCUMENTATION OF TREATMENT FOR NEPHROPATHY: ICD-10-PCS | Mod: CPTII,S$GLB,, | Performed by: NURSE PRACTITIONER

## 2023-12-27 PROCEDURE — 3008F PR BODY MASS INDEX (BMI) DOCUMENTED: ICD-10-PCS | Mod: CPTII,S$GLB,, | Performed by: NURSE PRACTITIONER

## 2023-12-27 PROCEDURE — 99215 PR OFFICE/OUTPT VISIT, EST, LEVL V, 40-54 MIN: ICD-10-PCS | Mod: S$GLB,,, | Performed by: NURSE PRACTITIONER

## 2023-12-27 PROCEDURE — 3075F PR MOST RECENT SYSTOLIC BLOOD PRESS GE 130-139MM HG: ICD-10-PCS | Mod: CPTII,S$GLB,, | Performed by: NURSE PRACTITIONER

## 2023-12-27 PROCEDURE — 99215 OFFICE O/P EST HI 40 MIN: CPT | Mod: S$GLB,,, | Performed by: NURSE PRACTITIONER

## 2023-12-27 PROCEDURE — 99999 PR PBB SHADOW E&M-EST. PATIENT-LVL IV: CPT | Mod: PBBFAC,,, | Performed by: NURSE PRACTITIONER

## 2023-12-27 NOTE — LETTER
December 27, 2023        Héctor Calvillo  [address]  [facility]  [place] [address]  Phone: [phone]  Fax: [phone]             [facility] Transplant 1st Fl  [address]  [facility] [address]  Phone: [phone]   Patient: Antwon Valdez   MR Number: [id number]   YOB: 1965   Date of Visit: 12/27/2023       Dear Dr. Héctor Calvillo    Thank you for referring Antwon Valdez to me for evaluation. Attached you will find relevant portions of my assessment and plan of care.    If you have questions, please do not hesitate to call me. I look forward to following Antwon Valdez along with you.    Sincerely,    Marva Juan, NP    Enclosure    If you would like to receive this communication electronically, please contact [email or web address] or [phone] to request [employer] access.    [employer] is a tool which provides read-only access to select patient information with whom you have a relationship. Its easy to use and provides real time access to review your patients record including encounter summaries, notes, results, and demographic information.    If you feel you have received this communication in error or would no longer like to receive these types of communications, please e-mail [email or web address]

## 2023-12-28 ENCOUNTER — DOCUMENTATION ONLY (OUTPATIENT)
Dept: TRANSPLANT | Facility: CLINIC | Age: 58
End: 2023-12-28

## 2024-01-02 DIAGNOSIS — Z94.0 S/P KIDNEY TRANSPLANT: Primary | ICD-10-CM

## 2024-01-02 LAB
EXT ALBUMIN: 3.9 (ref 3.7–5.3)
EXT BACTERIA UA: ABNORMAL
EXT BK VIRUS DNA QN PCR: NEGATIVE
EXT BUN: 14 (ref 6–20)
EXT CALCIUM: 9 (ref 8.5–10.5)
EXT CHLORIDE: 106 (ref 101–112)
EXT CO2: 26 (ref 18–32)
EXT CREATININE UA: 22.2
EXT CREATININE: 1.29 MG/DL (ref 0.6–1.2)
EXT EGFR NO RACE VARIABLE: >60
EXT EOSINOPHIL%: 1.4 (ref 0–7)
EXT GLUCOSE UA: NEGATIVE
EXT GLUCOSE: 89 (ref 74–106)
EXT HEMATOCRIT: 38.9 (ref 43.5–53.7)
EXT HEMOGLOBIN: 12.5 (ref 14.1–18.1)
EXT LYMPH%: 31.6 (ref 10–50)
EXT MONOCYTES%: 8.9 (ref 0–12)
EXT NITRITES UA: NEGATIVE
EXT PHOSPHORUS: 3.8 (ref 2.7–4.5)
EXT PLATELETS: 249 (ref 142–424)
EXT POTASSIUM: 3.9 (ref 3.4–5.1)
EXT PROT/CREAT RATIO UR: 0.19
EXT PROTEIN UA: NEGATIVE
EXT RBC UA: 0
EXT SEGS%: 57.5 (ref 37–80)
EXT SODIUM: 140 MMOL/L (ref 135–145)
EXT URINE PROTEIN: 4.3
EXT WBC UA: 0
EXT WBC: 10.2 (ref 4.6–10.2)

## 2024-01-03 RX ORDER — PREDNISONE 5 MG/1
5 TABLET ORAL DAILY
Qty: 93 TABLET | Refills: 3 | Status: SHIPPED | OUTPATIENT
Start: 2024-01-03

## 2024-01-10 ENCOUNTER — PATIENT MESSAGE (OUTPATIENT)
Dept: TRANSPLANT | Facility: CLINIC | Age: 59
End: 2024-01-10
Payer: MEDICARE

## 2024-01-18 ENCOUNTER — INFUSION (OUTPATIENT)
Dept: INFUSION THERAPY | Facility: HOSPITAL | Age: 59
End: 2024-01-18
Attending: INTERNAL MEDICINE
Payer: MEDICARE

## 2024-01-18 VITALS
BODY MASS INDEX: 28.31 KG/M2 | OXYGEN SATURATION: 99 % | DIASTOLIC BLOOD PRESSURE: 77 MMHG | HEART RATE: 73 BPM | WEIGHT: 209 LBS | RESPIRATION RATE: 18 BRPM | HEIGHT: 72 IN | SYSTOLIC BLOOD PRESSURE: 164 MMHG | TEMPERATURE: 99 F

## 2024-01-18 DIAGNOSIS — Z94.0 S/P KIDNEY TRANSPLANT: Primary | ICD-10-CM

## 2024-01-18 PROCEDURE — 25000003 PHARM REV CODE 250: Performed by: INTERNAL MEDICINE

## 2024-01-18 PROCEDURE — 96365 THER/PROPH/DIAG IV INF INIT: CPT

## 2024-01-18 PROCEDURE — 63600175 PHARM REV CODE 636 W HCPCS: Mod: JZ,JG,UD | Performed by: INTERNAL MEDICINE

## 2024-01-18 RX ORDER — DIPHENHYDRAMINE HYDROCHLORIDE 50 MG/ML
50 INJECTION INTRAMUSCULAR; INTRAVENOUS ONCE AS NEEDED
Status: CANCELLED | OUTPATIENT
Start: 2024-02-15

## 2024-01-18 RX ORDER — EPINEPHRINE 0.3 MG/.3ML
0.3 INJECTION SUBCUTANEOUS ONCE AS NEEDED
Status: CANCELLED | OUTPATIENT
Start: 2024-02-15

## 2024-01-18 RX ORDER — HEPARIN 100 UNIT/ML
500 SYRINGE INTRAVENOUS
Status: CANCELLED | OUTPATIENT
Start: 2024-02-15

## 2024-01-18 RX ADMIN — DEXTROSE MONOHYDRATE: 50 INJECTION, SOLUTION INTRAVENOUS at 08:01

## 2024-01-18 RX ADMIN — DEXTROSE MONOHYDRATE 475 MG: 50 INJECTION, SOLUTION INTRAVENOUS at 09:01

## 2024-01-18 NOTE — PLAN OF CARE
Problem: Adult Inpatient Plan of Care  Goal: Optimal Comfort and Wellbeing  Outcome: Ongoing, Progressing  Intervention: Provide Person-Centered Care  Flowsheets (Taken 1/18/2024 9406)  Trust Relationship/Rapport:   care explained   choices provided   emotional support provided   empathic listening provided   questions answered   reassurance provided   thoughts/feelings acknowledged

## 2024-01-24 ENCOUNTER — PATIENT MESSAGE (OUTPATIENT)
Dept: TRANSPLANT | Facility: CLINIC | Age: 59
End: 2024-01-24
Payer: MEDICARE

## 2024-02-06 ENCOUNTER — PATIENT MESSAGE (OUTPATIENT)
Dept: TRANSPLANT | Facility: CLINIC | Age: 59
End: 2024-02-06
Payer: MEDICARE

## 2024-02-06 ENCOUNTER — DOCUMENTATION ONLY (OUTPATIENT)
Dept: TRANSPLANT | Facility: CLINIC | Age: 59
End: 2024-02-06
Payer: MEDICARE

## 2024-02-06 LAB
EXT ANC: 6 (ref 1.4–6.5)
EXT BUN: 15 (ref 6–20)
EXT CALCIUM: 8.7 (ref 8.5–10.5)
EXT CHLORIDE: 111 (ref 101–112)
EXT CO2: 20 (ref 18–32)
EXT CREATININE: 1.2 MG/DL (ref 0.6–1.2)
EXT EGFR NO RACE VARIABLE: >60
EXT GLUCOSE: 83 (ref 74–106)
EXT HEMATOCRIT: 39.4 (ref 43.5–53.7)
EXT HEMOGLOBIN: 12.4 (ref 14.1–18.1)
EXT PLATELETS: 269 (ref 142–424)
EXT POTASSIUM: 3.9 (ref 3.4–5.1)
EXT SODIUM: 140 MMOL/L (ref 135–145)
EXT WBC: 10.6 (ref 4.6–10.2)

## 2024-02-15 ENCOUNTER — INFUSION (OUTPATIENT)
Dept: INFUSION THERAPY | Facility: HOSPITAL | Age: 59
End: 2024-02-15
Attending: INTERNAL MEDICINE
Payer: MEDICARE

## 2024-02-15 VITALS
RESPIRATION RATE: 15 BRPM | WEIGHT: 211.31 LBS | HEIGHT: 72 IN | TEMPERATURE: 98 F | HEART RATE: 75 BPM | DIASTOLIC BLOOD PRESSURE: 69 MMHG | OXYGEN SATURATION: 99 % | BODY MASS INDEX: 28.62 KG/M2 | SYSTOLIC BLOOD PRESSURE: 129 MMHG

## 2024-02-15 DIAGNOSIS — Z94.0 S/P KIDNEY TRANSPLANT: Primary | ICD-10-CM

## 2024-02-15 PROCEDURE — 25000003 PHARM REV CODE 250: Performed by: INTERNAL MEDICINE

## 2024-02-15 PROCEDURE — 63600175 PHARM REV CODE 636 W HCPCS: Mod: JG,UD | Performed by: INTERNAL MEDICINE

## 2024-02-15 PROCEDURE — 96365 THER/PROPH/DIAG IV INF INIT: CPT

## 2024-02-15 RX ORDER — HEPARIN 100 UNIT/ML
500 SYRINGE INTRAVENOUS
Status: CANCELLED | OUTPATIENT
Start: 2024-03-14

## 2024-02-15 RX ORDER — DIPHENHYDRAMINE HYDROCHLORIDE 50 MG/ML
50 INJECTION INTRAMUSCULAR; INTRAVENOUS ONCE AS NEEDED
Status: DISCONTINUED | OUTPATIENT
Start: 2024-02-15 | End: 2024-02-15 | Stop reason: HOSPADM

## 2024-02-15 RX ORDER — EPINEPHRINE 0.3 MG/.3ML
0.3 INJECTION SUBCUTANEOUS ONCE AS NEEDED
Status: DISCONTINUED | OUTPATIENT
Start: 2024-02-15 | End: 2024-02-15 | Stop reason: HOSPADM

## 2024-02-15 RX ORDER — DIPHENHYDRAMINE HYDROCHLORIDE 50 MG/ML
50 INJECTION INTRAMUSCULAR; INTRAVENOUS ONCE AS NEEDED
Status: CANCELLED | OUTPATIENT
Start: 2024-03-14

## 2024-02-15 RX ORDER — EPINEPHRINE 0.3 MG/.3ML
0.3 INJECTION SUBCUTANEOUS ONCE AS NEEDED
Status: CANCELLED | OUTPATIENT
Start: 2024-03-14

## 2024-02-15 RX ADMIN — DEXTROSE MONOHYDRATE: 5 INJECTION INTRAVENOUS at 07:02

## 2024-02-15 RX ADMIN — DEXTROSE 475 MG: 50 INJECTION, SOLUTION INTRAVENOUS at 07:02

## 2024-02-15 NOTE — PLAN OF CARE
Problem: Fatigue  Goal: Improved Activity Tolerance  Outcome: Ongoing, Progressing  Intervention: Promote Improved Energy  Flowsheets (Taken 2/15/2024 4399)  Fatigue Management:   fatigue-related activity identified   frequent rest breaks encouraged   paced activity encouraged  Sleep/Rest Enhancement:   regular sleep/rest pattern promoted   relaxation techniques promoted  Activity Management: Ambulated -L4

## 2024-02-28 ENCOUNTER — PATIENT MESSAGE (OUTPATIENT)
Dept: TRANSPLANT | Facility: CLINIC | Age: 59
End: 2024-02-28
Payer: MEDICARE

## 2024-02-29 ENCOUNTER — PATIENT MESSAGE (OUTPATIENT)
Dept: TRANSPLANT | Facility: CLINIC | Age: 59
End: 2024-02-29
Payer: MEDICARE

## 2024-02-29 ENCOUNTER — TELEPHONE (OUTPATIENT)
Dept: TRANSPLANT | Facility: CLINIC | Age: 59
End: 2024-02-29
Payer: MEDICARE

## 2024-02-29 NOTE — TELEPHONE ENCOUNTER
----- Message from Taylor Little RN sent at 2/29/2024 10:27 AM CST -----  Dr. Navarro,   Patient had labs done for his PCP and his Cr jumped up to 1.69 from 1.2 3 weeks ago.  He did say that he's not hydrating enough.  He doesn't have any complaints and is doing well.  I recommended he hydrate and would let him know if you have any further recommendations.  His lab are in care everywhere if you would like to take a look.  Swathi

## 2024-02-29 NOTE — TELEPHONE ENCOUNTER
Agree with plan to hydrate. Would repeat renal panel in approx one week.  No need for further w/u unless remains elevated.

## 2024-03-12 ENCOUNTER — DOCUMENTATION ONLY (OUTPATIENT)
Dept: TRANSPLANT | Facility: CLINIC | Age: 59
End: 2024-03-12
Payer: MEDICARE

## 2024-03-12 LAB
EXT ALBUMIN: ABNORMAL
EXT ALKALINE PHOSPHATASE: ABNORMAL
EXT ALLOSURE: ABNORMAL
EXT ALT: ABNORMAL
EXT AMYLASE: ABNORMAL
EXT ANC: ABNORMAL
EXT AST: ABNORMAL
EXT BACTERIA UA: ABNORMAL
EXT BANDS%: ABNORMAL
EXT BILIRUBIN DIRECT: ABNORMAL
EXT BILIRUBIN TOTAL: ABNORMAL
EXT BK VIRUS DNA QN PCR: ABNORMAL
EXT BUN: 19 (ref 6–20)
EXT BUN: 19 (ref 6–20)
EXT C PEPTIDE: ABNORMAL
EXT CALCIUM: 8.8 (ref 8.5–10.5)
EXT CALCIUM: 8.8 (ref 8.5–10.5)
EXT CHLORIDE: 110 (ref 101–112)
EXT CHLORIDE: 110 (ref 101–112)
EXT CHOLESTEROL: ABNORMAL
EXT CMV DNA QUANT. BY PCR: ABNORMAL
EXT CO2: 20 (ref 18–32)
EXT CO2: 20 (ref 18–32)
EXT CREATININE UA: ABNORMAL
EXT CREATININE: 1.47 MG/DL (ref 0.6–1.2)
EXT CREATININE: 1.47 MG/DL (ref 0.6–1.2)
EXT CYCLOSPORINE LVL: ABNORMAL
EXT EBV DNA BY PCR: ABNORMAL
EXT EBV IGG: ABNORMAL
EXT EGFR NO RACE VARIABLE: 55
EXT EGFR NO RACE VARIABLE: 55
EXT EOSINOPHIL%: ABNORMAL
EXT FERRITIN: ABNORMAL
EXT GFR MDRD AF AMER: ABNORMAL
EXT GFR MDRD NON AF AMER: ABNORMAL
EXT GLUCOSE UA: ABNORMAL
EXT GLUCOSE: 104 (ref 74–106)
EXT GLUCOSE: 104 (ref 74–106)
EXT HBV DNA QUANT PCR: ABNORMAL
EXT HCV QUANT: ABNORMAL
EXT HDL: ABNORMAL
EXT HEMATOCRIT: ABNORMAL
EXT HEMOGLOBIN A1C: ABNORMAL
EXT HEMOGLOBIN: ABNORMAL
EXT HIV RNA QUANT PCR: ABNORMAL
EXT IMMUNKNOW (STIMULATED): ABNORMAL
EXT IRON SATURATION: ABNORMAL
EXT LDH, TOTAL: ABNORMAL
EXT LDL CHOLESTEROL: ABNORMAL
EXT LEFLUNOMIDE METABOLITE: ABNORMAL
EXT LIPASE: ABNORMAL
EXT LYMPH%: ABNORMAL
EXT MAGNESIUM: ABNORMAL
EXT MONOCYTES%: ABNORMAL
EXT NITRITES UA: ABNORMAL
EXT PHOSPHORUS: ABNORMAL
EXT PLATELETS: ABNORMAL
EXT POTASSIUM: 4.2 (ref 3.4–5.1)
EXT POTASSIUM: 4.2 (ref 3.4–5.1)
EXT PROT/CREAT RATIO UR: ABNORMAL
EXT PROTEIN TOTAL: ABNORMAL
EXT PROTEIN UA: ABNORMAL
EXT PTH, INTACT: ABNORMAL
EXT RBC UA: ABNORMAL
EXT SEGS%: ABNORMAL
EXT SERUM IRON: ABNORMAL
EXT SIROLIMUS LVL: ABNORMAL
EXT SODIUM: 138 MMOL/L (ref 135–145)
EXT SODIUM: 138 MMOL/L (ref 135–145)
EXT TACROLIMUS LVL: ABNORMAL
EXT TIBC: ABNORMAL
EXT TRIGLYCERIDES: ABNORMAL
EXT URIC ACID: ABNORMAL
EXT URINE CULTURE: ABNORMAL
EXT URINE PROTEIN: ABNORMAL
EXT VIT D 25 HYDROXY: ABNORMAL
EXT WBC UA: ABNORMAL
EXT WBC: ABNORMAL
PARVOVIRUS B19 DNA BY PCR: ABNORMAL
QUANTIFERON GOLD TB: ABNORMAL

## 2024-03-13 ENCOUNTER — PATIENT MESSAGE (OUTPATIENT)
Dept: TRANSPLANT | Facility: CLINIC | Age: 59
End: 2024-03-13
Payer: MEDICARE

## 2024-03-14 ENCOUNTER — INFUSION (OUTPATIENT)
Dept: INFUSION THERAPY | Facility: HOSPITAL | Age: 59
End: 2024-03-14
Attending: INTERNAL MEDICINE
Payer: MEDICARE

## 2024-03-14 VITALS
HEART RATE: 67 BPM | TEMPERATURE: 98 F | BODY MASS INDEX: 29.31 KG/M2 | HEIGHT: 72 IN | DIASTOLIC BLOOD PRESSURE: 72 MMHG | OXYGEN SATURATION: 99 % | WEIGHT: 216.38 LBS | SYSTOLIC BLOOD PRESSURE: 163 MMHG | RESPIRATION RATE: 15 BRPM

## 2024-03-14 DIAGNOSIS — Z94.0 S/P KIDNEY TRANSPLANT: Primary | ICD-10-CM

## 2024-03-14 PROCEDURE — 25000003 PHARM REV CODE 250: Mod: UD | Performed by: INTERNAL MEDICINE

## 2024-03-14 PROCEDURE — 96365 THER/PROPH/DIAG IV INF INIT: CPT

## 2024-03-14 PROCEDURE — 63600175 PHARM REV CODE 636 W HCPCS: Mod: JW,JG,UD | Performed by: INTERNAL MEDICINE

## 2024-03-14 RX ORDER — EPINEPHRINE 0.3 MG/.3ML
0.3 INJECTION SUBCUTANEOUS ONCE AS NEEDED
Status: CANCELLED | OUTPATIENT
Start: 2024-04-11

## 2024-03-14 RX ORDER — DIPHENHYDRAMINE HYDROCHLORIDE 50 MG/ML
50 INJECTION INTRAMUSCULAR; INTRAVENOUS ONCE AS NEEDED
Status: CANCELLED | OUTPATIENT
Start: 2024-04-11

## 2024-03-14 RX ORDER — EPINEPHRINE 0.3 MG/.3ML
0.3 INJECTION SUBCUTANEOUS ONCE AS NEEDED
Status: DISCONTINUED | OUTPATIENT
Start: 2024-03-14 | End: 2024-03-14 | Stop reason: HOSPADM

## 2024-03-14 RX ORDER — DIPHENHYDRAMINE HYDROCHLORIDE 50 MG/ML
50 INJECTION INTRAMUSCULAR; INTRAVENOUS ONCE AS NEEDED
Status: DISCONTINUED | OUTPATIENT
Start: 2024-03-14 | End: 2024-03-14 | Stop reason: HOSPADM

## 2024-03-14 RX ORDER — HEPARIN 100 UNIT/ML
500 SYRINGE INTRAVENOUS
Status: CANCELLED | OUTPATIENT
Start: 2024-04-11

## 2024-03-14 RX ADMIN — DEXTROSE MONOHYDRATE: 50 INJECTION, SOLUTION INTRAVENOUS at 07:03

## 2024-03-14 RX ADMIN — DEXTROSE MONOHYDRATE 487.5 MG: 50 INJECTION, SOLUTION INTRAVENOUS at 07:03

## 2024-03-14 NOTE — PLAN OF CARE
Problem: Fatigue  Goal: Improved Activity Tolerance  Outcome: Ongoing, Progressing  Intervention: Promote Improved Energy  Flowsheets (Taken 3/14/2024 9642)  Fatigue Management:   fatigue-related activity identified   frequent rest breaks encouraged   paced activity encouraged  Sleep/Rest Enhancement:   regular sleep/rest pattern promoted   relaxation techniques promoted  Activity Management: Ambulated -L4

## 2024-04-04 ENCOUNTER — PATIENT MESSAGE (OUTPATIENT)
Dept: TRANSPLANT | Facility: CLINIC | Age: 59
End: 2024-04-04
Payer: MEDICARE

## 2024-04-08 ENCOUNTER — DOCUMENTATION ONLY (OUTPATIENT)
Dept: TRANSPLANT | Facility: CLINIC | Age: 59
End: 2024-04-08
Payer: MEDICARE

## 2024-04-08 LAB
EXT ANC: 4.5 (ref 1.4–6.5)
EXT BUN: 21 (ref 6–20)
EXT CHLORIDE: 108 (ref 101–112)
EXT CO2: 19 (ref 18–32)
EXT CREATININE: 1.53 MG/DL (ref 0.6–1.2)
EXT GFR MDRD NON AF AMER: 52
EXT GLUCOSE: 102 (ref 74–106)
EXT HEMATOCRIT: 37.5 (ref 43.5–53.7)
EXT HEMOGLOBIN: 11.3 (ref 14.1–18.1)
EXT PLATELETS: 263 (ref 142–424)
EXT POTASSIUM: 3.7 (ref 3.4–5.1)
EXT SODIUM: 138 MMOL/L (ref 135–145)
EXT WBC: 9.5 (ref 4.6–10.2)

## 2024-04-11 ENCOUNTER — INFUSION (OUTPATIENT)
Dept: INFUSION THERAPY | Facility: HOSPITAL | Age: 59
End: 2024-04-11
Attending: INTERNAL MEDICINE
Payer: MEDICARE

## 2024-04-11 VITALS
BODY MASS INDEX: 29.69 KG/M2 | WEIGHT: 219.19 LBS | SYSTOLIC BLOOD PRESSURE: 160 MMHG | RESPIRATION RATE: 16 BRPM | DIASTOLIC BLOOD PRESSURE: 78 MMHG | HEART RATE: 64 BPM | TEMPERATURE: 99 F | HEIGHT: 72 IN | OXYGEN SATURATION: 100 %

## 2024-04-11 DIAGNOSIS — Z94.0 S/P KIDNEY TRANSPLANT: Primary | ICD-10-CM

## 2024-04-11 PROCEDURE — 25000003 PHARM REV CODE 250: Performed by: INTERNAL MEDICINE

## 2024-04-11 PROCEDURE — 63600175 PHARM REV CODE 636 W HCPCS: Mod: JG,UD | Performed by: INTERNAL MEDICINE

## 2024-04-11 PROCEDURE — 96365 THER/PROPH/DIAG IV INF INIT: CPT

## 2024-04-11 RX ORDER — HEPARIN 100 UNIT/ML
500 SYRINGE INTRAVENOUS
Status: CANCELLED | OUTPATIENT
Start: 2024-05-09

## 2024-04-11 RX ORDER — DIPHENHYDRAMINE HYDROCHLORIDE 50 MG/ML
50 INJECTION INTRAMUSCULAR; INTRAVENOUS ONCE AS NEEDED
Status: DISCONTINUED | OUTPATIENT
Start: 2024-04-11 | End: 2024-04-11 | Stop reason: HOSPADM

## 2024-04-11 RX ORDER — EPINEPHRINE 0.3 MG/.3ML
0.3 INJECTION SUBCUTANEOUS ONCE AS NEEDED
Status: CANCELLED | OUTPATIENT
Start: 2024-05-09

## 2024-04-11 RX ORDER — EPINEPHRINE 0.3 MG/.3ML
0.3 INJECTION SUBCUTANEOUS ONCE AS NEEDED
Status: DISCONTINUED | OUTPATIENT
Start: 2024-04-11 | End: 2024-04-11 | Stop reason: HOSPADM

## 2024-04-11 RX ORDER — DIPHENHYDRAMINE HYDROCHLORIDE 50 MG/ML
50 INJECTION INTRAMUSCULAR; INTRAVENOUS ONCE AS NEEDED
Status: CANCELLED | OUTPATIENT
Start: 2024-05-09

## 2024-04-11 RX ADMIN — DEXTROSE MONOHYDRATE 487.5 MG: 50 INJECTION, SOLUTION INTRAVENOUS at 07:04

## 2024-04-11 NOTE — PLAN OF CARE
Problem: Fatigue  Goal: Improved Activity Tolerance  Outcome: Ongoing, Progressing  Intervention: Promote Improved Energy  Flowsheets (Taken 4/11/2024 7872)  Fatigue Management:   activity schedule adjusted   activity assistance provided  Activity Management: Ambulated -L4

## 2024-05-01 ENCOUNTER — PATIENT MESSAGE (OUTPATIENT)
Dept: TRANSPLANT | Facility: CLINIC | Age: 59
End: 2024-05-01
Payer: MEDICARE

## 2024-05-09 ENCOUNTER — INFUSION (OUTPATIENT)
Dept: INFUSION THERAPY | Facility: HOSPITAL | Age: 59
End: 2024-05-09
Attending: INTERNAL MEDICINE
Payer: MEDICARE

## 2024-05-09 VITALS
OXYGEN SATURATION: 99 % | RESPIRATION RATE: 16 BRPM | WEIGHT: 213.63 LBS | BODY MASS INDEX: 28.93 KG/M2 | DIASTOLIC BLOOD PRESSURE: 72 MMHG | SYSTOLIC BLOOD PRESSURE: 129 MMHG | HEART RATE: 62 BPM | TEMPERATURE: 98 F | HEIGHT: 72 IN

## 2024-05-09 DIAGNOSIS — Z94.0 S/P KIDNEY TRANSPLANT: Primary | ICD-10-CM

## 2024-05-09 PROCEDURE — 63600175 PHARM REV CODE 636 W HCPCS: Mod: JG,UD | Performed by: INTERNAL MEDICINE

## 2024-05-09 PROCEDURE — 25000003 PHARM REV CODE 250: Performed by: INTERNAL MEDICINE

## 2024-05-09 PROCEDURE — 96365 THER/PROPH/DIAG IV INF INIT: CPT

## 2024-05-09 RX ORDER — DIPHENHYDRAMINE HYDROCHLORIDE 50 MG/ML
50 INJECTION INTRAMUSCULAR; INTRAVENOUS ONCE AS NEEDED
Status: CANCELLED | OUTPATIENT
Start: 2024-06-06

## 2024-05-09 RX ORDER — EPINEPHRINE 0.3 MG/.3ML
0.3 INJECTION SUBCUTANEOUS ONCE AS NEEDED
Status: DISCONTINUED | OUTPATIENT
Start: 2024-05-09 | End: 2024-05-09 | Stop reason: HOSPADM

## 2024-05-09 RX ORDER — EPINEPHRINE 0.3 MG/.3ML
0.3 INJECTION SUBCUTANEOUS ONCE AS NEEDED
Status: CANCELLED | OUTPATIENT
Start: 2024-06-06

## 2024-05-09 RX ORDER — DIPHENHYDRAMINE HYDROCHLORIDE 50 MG/ML
50 INJECTION INTRAMUSCULAR; INTRAVENOUS ONCE AS NEEDED
Status: DISCONTINUED | OUTPATIENT
Start: 2024-05-09 | End: 2024-05-09 | Stop reason: HOSPADM

## 2024-05-09 RX ORDER — HEPARIN 100 UNIT/ML
500 SYRINGE INTRAVENOUS
Status: CANCELLED | OUTPATIENT
Start: 2024-06-06

## 2024-05-09 RX ADMIN — DEXTROSE MONOHYDRATE 487.5 MG: 50 INJECTION, SOLUTION INTRAVENOUS at 07:05

## 2024-05-09 NOTE — PLAN OF CARE
Problem: Fall Injury Risk  Goal: Absence of Fall and Fall-Related Injury  Outcome: Progressing  Intervention: Identify and Manage Contributors  Flowsheets (Taken 5/9/2024 0722)  Self-Care Promotion: adaptive equipment use encouraged  Medication Review/Management: medications reviewed  Intervention: Promote Injury-Free Environment  Flowsheets (Taken 5/9/2024 0722)  Safety Promotion/Fall Prevention: assistive device/personal item within reach

## 2024-05-22 ENCOUNTER — PATIENT MESSAGE (OUTPATIENT)
Dept: TRANSPLANT | Facility: CLINIC | Age: 59
End: 2024-05-22
Payer: MEDICARE

## 2024-05-24 ENCOUNTER — DOCUMENTATION ONLY (OUTPATIENT)
Dept: TRANSPLANT | Facility: CLINIC | Age: 59
End: 2024-05-24
Payer: MEDICARE

## 2024-05-24 LAB
EXT ALBUMIN: 3.9 (ref 3.7–5.3)
EXT ANC: 5.1 (ref 1.4–6.5)
EXT BACTERIA UA: ABNORMAL
EXT BUN: 18 (ref 6–20)
EXT CALCIUM: 8.6 (ref 8.5–10.5)
EXT CHLORIDE: 111 (ref 101–112)
EXT CO2: 18 (ref 18–32)
EXT CREATININE UA: 89.9
EXT CREATININE: 1.48 MG/DL (ref 0.6–1.2)
EXT EGFR NO RACE VARIABLE: 54
EXT GLUCOSE UA: NEGATIVE
EXT GLUCOSE: 100 (ref 74–106)
EXT HEMATOCRIT: 41.3 (ref 43.5–53.7)
EXT HEMOGLOBIN: 12.8 (ref 14.1–18.1)
EXT MAGNESIUM: 2.4 (ref 1.3–2.1)
EXT NITRITES UA: NEGATIVE
EXT PHOSPHORUS: 3.4 (ref 2.7–4.5)
EXT PLATELETS: 262 (ref 142–424)
EXT POTASSIUM: 4.3 (ref 3.4–5.1)
EXT PROT/CREAT RATIO UR: 0.37
EXT PROTEIN UA: ABNORMAL
EXT RBC UA: ABNORMAL
EXT SODIUM: 140 MMOL/L (ref 135–145)
EXT URINE PROTEIN: 32.9
EXT WBC UA: ABNORMAL
EXT WBC: 9.5 (ref 4.6–10.2)

## 2024-05-27 ENCOUNTER — TELEPHONE (OUTPATIENT)
Dept: TRANSPLANT | Facility: CLINIC | Age: 59
End: 2024-05-27
Payer: MEDICARE

## 2024-05-27 NOTE — TELEPHONE ENCOUNTER
Surgery Clinic appt made per pt request.  ----- Message from Michael Cordon MD sent at 5/27/2024  8:21 AM CDT -----  Labs stable I suggest patient to see surgery or you can discuss case with clinic surgeon  ----- Message -----  From: Yue Fairbanks RN  Sent: 5/24/2024   3:35 PM CDT  To: Michael Cordon MD    Labs drawn for c/o mild pain at base of incision site

## 2024-05-31 ENCOUNTER — PATIENT MESSAGE (OUTPATIENT)
Dept: TRANSPLANT | Facility: CLINIC | Age: 59
End: 2024-05-31

## 2024-05-31 ENCOUNTER — OFFICE VISIT (OUTPATIENT)
Dept: TRANSPLANT | Facility: CLINIC | Age: 59
End: 2024-05-31
Payer: MEDICARE

## 2024-05-31 VITALS
OXYGEN SATURATION: 100 % | WEIGHT: 214.5 LBS | DIASTOLIC BLOOD PRESSURE: 71 MMHG | RESPIRATION RATE: 18 BRPM | BODY MASS INDEX: 29.05 KG/M2 | HEART RATE: 63 BPM | TEMPERATURE: 97 F | SYSTOLIC BLOOD PRESSURE: 147 MMHG | HEIGHT: 72 IN

## 2024-05-31 DIAGNOSIS — Z79.60 LONG-TERM USE OF IMMUNOSUPPRESSANT MEDICATION: ICD-10-CM

## 2024-05-31 DIAGNOSIS — R10.31 RIGHT LOWER QUADRANT ABDOMINAL PAIN: Primary | ICD-10-CM

## 2024-05-31 DIAGNOSIS — Z94.0 S/P KIDNEY TRANSPLANT: Primary | ICD-10-CM

## 2024-05-31 DIAGNOSIS — Z51.81 ENCOUNTER FOR THERAPEUTIC DRUG MONITORING: ICD-10-CM

## 2024-05-31 PROCEDURE — 1159F MED LIST DOCD IN RCRD: CPT | Mod: CPTII,S$GLB,, | Performed by: TRANSPLANT SURGERY

## 2024-05-31 PROCEDURE — 3078F DIAST BP <80 MM HG: CPT | Mod: CPTII,S$GLB,, | Performed by: TRANSPLANT SURGERY

## 2024-05-31 PROCEDURE — 99214 OFFICE O/P EST MOD 30 MIN: CPT | Mod: 24,S$GLB,, | Performed by: TRANSPLANT SURGERY

## 2024-05-31 PROCEDURE — 3077F SYST BP >= 140 MM HG: CPT | Mod: CPTII,S$GLB,, | Performed by: TRANSPLANT SURGERY

## 2024-05-31 PROCEDURE — 3008F BODY MASS INDEX DOCD: CPT | Mod: CPTII,S$GLB,, | Performed by: TRANSPLANT SURGERY

## 2024-05-31 PROCEDURE — 99999 PR PBB SHADOW E&M-EST. PATIENT-LVL IV: CPT | Mod: PBBFAC,,,

## 2024-05-31 PROCEDURE — 4010F ACE/ARB THERAPY RXD/TAKEN: CPT | Mod: CPTII,S$GLB,, | Performed by: TRANSPLANT SURGERY

## 2024-05-31 NOTE — PROGRESS NOTES
Transplant Surgery  Kidney Transplant Recipient Follow-up    Referring Physician: Héctor Calvillo  Current Nephrologist: Héctor Calvillo    Subjective:     Chief Complaint: Antwon Valdez is a 59 y.o. year old White male who is status post Kidney transplant performed on 12/25/2021.    ORGAN:   RIGHT KIDNEY  Disease Etiology: Diabetes Mellitus - Type Other / Unknown  Donor Type:   Donation after Brain Death  Donor CMV Status:   Positive  Donor HBcAB:   Negative  Donor HCV Status:   Negative    History of Present Illness: He reports  pain and a knot above the allograft and incision . He says pain sometimes radiates into groin  From a transplant perspective, he reports normal urination.  Antwon is here for management of his immunosuppression medication.  Antwon states that his immunosuppression is being well tolerated.  Hypertension is not present.    External provider notes reviewed: Yes    Review of Systems    Objective:     Physical Exam  Constitutional:       Appearance: He is well-developed.   HENT:      Head: Normocephalic and atraumatic.   Eyes:      Pupils: Pupils are equal, round, and reactive to light.   Neck:      Vascular: No JVD.   Cardiovascular:      Rate and Rhythm: Normal rate and regular rhythm.      Heart sounds: Normal heart sounds.   Pulmonary:      Effort: Pulmonary effort is normal.      Breath sounds: Normal breath sounds. No stridor.   Abdominal:      General: There is no distension.      Palpations: Abdomen is soft.      Tenderness: There is no abdominal tenderness.       Musculoskeletal:         General: Normal range of motion.   Skin:     General: Skin is warm and dry.   Neurological:      Mental Status: He is alert and oriented to person, place, and time.   Psychiatric:         Behavior: Behavior normal.     Lab Results   Component Value Date    CREATININE 1.4 01/13/2023    BUN 24 (H) 01/13/2023     Lab Results   Component Value Date    WBC 10.92 01/13/2023    HGB 13.6 (L)  01/13/2023    HCT 43.1 01/13/2023    HCT 28 (L) 12/25/2021     01/13/2023     Lab Results   Component Value Date    TACROLIMUS 7.7 01/13/2023       Diagnostics:  The following labs have been reviewed: CBC  CMP  INR  TACROLIMUS LEVEL  The following radiology images have been independently reviewed and interpreted: CT Abd/Pelvis    Assessment and Plan:        S/P Kidney transplant.  No hernia evident on exam or CT from last year, repeat CT noncon abd/pelvis please to evaluate.   If that's negative, I think we should offer him pain referral for possible injection    Chronic immunosuppressive medications for rejection prophylaxis at target.  Plan: no adjustment needed.  Continue monitoring symptoms, labs and drug levels for drug-related toxicity and side effects.  Renal hypertension at target.    Additional testing to be completed according to the Kidney: Written Order Guideline for Kidney Transplant Follow-Up (KI-09)    Interpretation of tests and discussion of patient management involves all members of the multidisciplinary transplant team.  Patient advised that it is recommended that all transplant candidates, and their close contacts and household members receive Covid vaccination.  Follow-up: Patient reminded to call with any health changes, since these can be early signs of significant complications.  Also, I advised the patient to be sure any new medications or changes of old medications are discussed with either a pharmacist, or physician knowledgeable with transplant to avoid rejection/drug toxicity related to significant drug interactions.    MD ROLANDO Rosa Jr Patient Status  Functional Status: 100% - Normal, no complaints, no evidence of disease  Physical Capacity: No Limitations

## 2024-06-06 ENCOUNTER — INFUSION (OUTPATIENT)
Dept: INFUSION THERAPY | Facility: HOSPITAL | Age: 59
End: 2024-06-06
Attending: INTERNAL MEDICINE
Payer: MEDICARE

## 2024-06-06 VITALS
OXYGEN SATURATION: 100 % | SYSTOLIC BLOOD PRESSURE: 138 MMHG | WEIGHT: 218.81 LBS | HEIGHT: 72 IN | RESPIRATION RATE: 16 BRPM | TEMPERATURE: 98 F | BODY MASS INDEX: 29.64 KG/M2 | DIASTOLIC BLOOD PRESSURE: 79 MMHG | HEART RATE: 77 BPM

## 2024-06-06 DIAGNOSIS — Z94.0 S/P KIDNEY TRANSPLANT: Primary | ICD-10-CM

## 2024-06-06 PROCEDURE — 63600175 PHARM REV CODE 636 W HCPCS: Mod: JG,UD | Performed by: INTERNAL MEDICINE

## 2024-06-06 PROCEDURE — 96365 THER/PROPH/DIAG IV INF INIT: CPT

## 2024-06-06 PROCEDURE — 25000003 PHARM REV CODE 250: Performed by: INTERNAL MEDICINE

## 2024-06-06 RX ORDER — DIPHENHYDRAMINE HYDROCHLORIDE 50 MG/ML
50 INJECTION INTRAMUSCULAR; INTRAVENOUS ONCE AS NEEDED
OUTPATIENT
Start: 2024-07-04

## 2024-06-06 RX ORDER — DIPHENHYDRAMINE HYDROCHLORIDE 50 MG/ML
50 INJECTION INTRAMUSCULAR; INTRAVENOUS ONCE AS NEEDED
Status: DISCONTINUED | OUTPATIENT
Start: 2024-06-06 | End: 2024-06-06 | Stop reason: HOSPADM

## 2024-06-06 RX ORDER — HEPARIN 100 UNIT/ML
500 SYRINGE INTRAVENOUS
OUTPATIENT
Start: 2024-07-04

## 2024-06-06 RX ORDER — EPINEPHRINE 0.3 MG/.3ML
0.3 INJECTION SUBCUTANEOUS ONCE AS NEEDED
Status: DISCONTINUED | OUTPATIENT
Start: 2024-06-06 | End: 2024-06-06 | Stop reason: HOSPADM

## 2024-06-06 RX ORDER — EPINEPHRINE 0.3 MG/.3ML
0.3 INJECTION SUBCUTANEOUS ONCE AS NEEDED
OUTPATIENT
Start: 2024-07-04

## 2024-06-06 RX ADMIN — DEXTROSE MONOHYDRATE 487.5 MG: 50 INJECTION, SOLUTION INTRAVENOUS at 07:06

## 2024-06-06 NOTE — PLAN OF CARE
Problem: Fatigue  Goal: Improved Activity Tolerance  Outcome: Progressing  Intervention: Promote Improved Energy  Flowsheets (Taken 6/6/2024 4209)  Sleep/Rest Enhancement: regular sleep/rest pattern promoted  Environmental Support: rest periods encouraged

## 2024-06-19 ENCOUNTER — TELEPHONE (OUTPATIENT)
Dept: TRANSPLANT | Facility: CLINIC | Age: 59
End: 2024-06-19
Payer: MEDICARE

## 2024-06-19 NOTE — TELEPHONE ENCOUNTER
"----- Message from Taylor Little RN sent at 6/6/2024  2:20 PM CDT -----    ----- Message -----  From: Ryan Renee  Sent: 6/6/2024   2:19 PM CDT  To: [facility] Post-Kidney Transplant Clinical    Consult/Advisory    Name Of Caller:  May [[facility]]    Contact Preference?: [phone]    What is the nature of the call?: Calling to speak w/ Taylor. Inquiring about PA for CT scan (per Dr. Bautista)    Additional Notes:  "Thank you for all that you do for our patients"  "

## 2024-07-05 ENCOUNTER — INFUSION (OUTPATIENT)
Dept: INFUSION THERAPY | Facility: HOSPITAL | Age: 59
End: 2024-07-05
Attending: INTERNAL MEDICINE
Payer: MEDICARE

## 2024-07-05 ENCOUNTER — NURSE TRIAGE (OUTPATIENT)
Dept: ADMINISTRATIVE | Facility: CLINIC | Age: 59
End: 2024-07-05
Payer: MEDICARE

## 2024-07-05 ENCOUNTER — PATIENT MESSAGE (OUTPATIENT)
Dept: TRANSPLANT | Facility: CLINIC | Age: 59
End: 2024-07-05
Payer: MEDICARE

## 2024-07-05 VITALS
HEIGHT: 72 IN | WEIGHT: 224.75 LBS | BODY MASS INDEX: 30.44 KG/M2 | RESPIRATION RATE: 18 BRPM | DIASTOLIC BLOOD PRESSURE: 64 MMHG | SYSTOLIC BLOOD PRESSURE: 131 MMHG | HEART RATE: 70 BPM | TEMPERATURE: 98 F

## 2024-07-05 DIAGNOSIS — Z94.0 S/P KIDNEY TRANSPLANT: Primary | ICD-10-CM

## 2024-07-05 PROCEDURE — 63600175 PHARM REV CODE 636 W HCPCS: Mod: JZ,JG,UD | Performed by: INTERNAL MEDICINE

## 2024-07-05 PROCEDURE — 96365 THER/PROPH/DIAG IV INF INIT: CPT

## 2024-07-05 PROCEDURE — 25000003 PHARM REV CODE 250: Performed by: INTERNAL MEDICINE

## 2024-07-05 RX ORDER — DIPHENHYDRAMINE HYDROCHLORIDE 50 MG/ML
50 INJECTION INTRAMUSCULAR; INTRAVENOUS ONCE AS NEEDED
Status: DISCONTINUED | OUTPATIENT
Start: 2024-07-05 | End: 2024-07-05 | Stop reason: HOSPADM

## 2024-07-05 RX ORDER — HEPARIN 100 UNIT/ML
500 SYRINGE INTRAVENOUS
OUTPATIENT
Start: 2024-08-02

## 2024-07-05 RX ORDER — EPINEPHRINE 0.3 MG/.3ML
0.3 INJECTION SUBCUTANEOUS ONCE AS NEEDED
OUTPATIENT
Start: 2024-08-02

## 2024-07-05 RX ORDER — DIPHENHYDRAMINE HYDROCHLORIDE 50 MG/ML
50 INJECTION INTRAMUSCULAR; INTRAVENOUS ONCE AS NEEDED
OUTPATIENT
Start: 2024-08-02

## 2024-07-05 RX ORDER — EPINEPHRINE 0.3 MG/.3ML
0.3 INJECTION SUBCUTANEOUS ONCE AS NEEDED
Status: DISCONTINUED | OUTPATIENT
Start: 2024-07-05 | End: 2024-07-05 | Stop reason: HOSPADM

## 2024-07-05 RX ADMIN — DEXTROSE MONOHYDRATE 500 MG: 50 INJECTION, SOLUTION INTRAVENOUS at 07:07

## 2024-07-05 NOTE — PLAN OF CARE
Problem: Fatigue  Goal: Improved Activity Tolerance  7/5/2024 0726 by Patricia Posey, RN  Outcome: Met  7/5/2024 0726 by Patricia Posey, RN  Outcome: Progressing

## 2024-07-06 NOTE — TELEPHONE ENCOUNTER
Post-Kidney Transplant 12/2021    Patient says he is positive for COVID. He says he went to the ER to get the antiviral infusion but the er dr wants to talk to his kidney transplant dr. He is at [facility] in [place], [place]. Patient given  number [phone] for  to contact on call provider with [facility]. No other needs voiced at this time. Please contact caller directly to discuss any further care advice.    Reason for Disposition   ED call to PCP (i.e., primary care provider; doctor, NP, or PA)    Protocols used: PCP Call - No Triage-A-

## 2024-07-26 ENCOUNTER — DOCUMENTATION ONLY (OUTPATIENT)
Dept: TRANSPLANT | Facility: CLINIC | Age: 59
End: 2024-07-26
Payer: MEDICARE

## 2024-08-01 ENCOUNTER — INFUSION (OUTPATIENT)
Dept: INFUSION THERAPY | Facility: HOSPITAL | Age: 59
End: 2024-08-01
Attending: INTERNAL MEDICINE
Payer: MEDICARE

## 2024-08-01 VITALS
DIASTOLIC BLOOD PRESSURE: 77 MMHG | HEIGHT: 72 IN | TEMPERATURE: 98 F | SYSTOLIC BLOOD PRESSURE: 147 MMHG | OXYGEN SATURATION: 100 % | RESPIRATION RATE: 15 BRPM | BODY MASS INDEX: 30.54 KG/M2 | HEART RATE: 67 BPM | WEIGHT: 225.5 LBS

## 2024-08-01 DIAGNOSIS — Z94.0 S/P KIDNEY TRANSPLANT: Primary | ICD-10-CM

## 2024-08-01 PROCEDURE — 25000003 PHARM REV CODE 250: Performed by: INTERNAL MEDICINE

## 2024-08-01 PROCEDURE — 63600175 PHARM REV CODE 636 W HCPCS: Mod: JG,UD | Performed by: INTERNAL MEDICINE

## 2024-08-01 PROCEDURE — 96365 THER/PROPH/DIAG IV INF INIT: CPT

## 2024-08-01 RX ORDER — DIPHENHYDRAMINE HYDROCHLORIDE 50 MG/ML
50 INJECTION INTRAMUSCULAR; INTRAVENOUS ONCE AS NEEDED
OUTPATIENT
Start: 2024-08-02

## 2024-08-01 RX ORDER — HEPARIN 100 UNIT/ML
500 SYRINGE INTRAVENOUS
OUTPATIENT
Start: 2024-08-02

## 2024-08-01 RX ORDER — DIPHENHYDRAMINE HYDROCHLORIDE 50 MG/ML
50 INJECTION INTRAMUSCULAR; INTRAVENOUS ONCE AS NEEDED
Status: DISCONTINUED | OUTPATIENT
Start: 2024-08-01 | End: 2024-08-01 | Stop reason: HOSPADM

## 2024-08-01 RX ORDER — EPINEPHRINE 0.3 MG/.3ML
0.3 INJECTION SUBCUTANEOUS ONCE AS NEEDED
Status: DISCONTINUED | OUTPATIENT
Start: 2024-08-01 | End: 2024-08-01 | Stop reason: HOSPADM

## 2024-08-01 RX ORDER — EPINEPHRINE 0.3 MG/.3ML
0.3 INJECTION SUBCUTANEOUS ONCE AS NEEDED
OUTPATIENT
Start: 2024-08-02

## 2024-08-01 RX ADMIN — DEXTROSE MONOHYDRATE 512.5 MG: 50 INJECTION, SOLUTION INTRAVENOUS at 07:08

## 2024-08-01 NOTE — PLAN OF CARE
Problem: Fall Injury Risk  Goal: Absence of Fall and Fall-Related Injury  Outcome: Progressing  Intervention: Identify and Manage Contributors  Flowsheets (Taken 8/1/2024 0220)  Self-Care Promotion:   independence encouraged   BADL personal objects within reach   adaptive equipment use encouraged  Medication Review/Management: medications reviewed

## 2024-08-02 LAB
EXT ANC: 6.6 (ref 1.4–7.8)
EXT BUN: 17 (ref 6–20)
EXT CALCIUM: 8.9 (ref 8.5–105)
EXT CHLORIDE: 110 (ref 101–112)
EXT CO2: 18 (ref 18–32)
EXT CREATININE: 1.36 MG/DL (ref 0.6–1.2)
EXT EGFR NO RACE VARIABLE: 60
EXT GLUCOSE: 109 (ref 74–106)
EXT HEMATOCRIT: 38.8 (ref 43.5–53.7)
EXT HEMOGLOBIN: 12 (ref 14.1–18.1)
EXT PLATELETS: 314 (ref 142–424)
EXT POTASSIUM: 3.8 (ref 3.4–5.1)
EXT SODIUM: 138 MMOL/L (ref 135–145)
EXT WBC: 11.6 (ref 4.6–10.2)

## 2024-08-16 ENCOUNTER — PATIENT MESSAGE (OUTPATIENT)
Dept: TRANSPLANT | Facility: CLINIC | Age: 59
End: 2024-08-16
Payer: MEDICARE

## 2024-08-29 ENCOUNTER — INFUSION (OUTPATIENT)
Dept: INFUSION THERAPY | Facility: HOSPITAL | Age: 59
End: 2024-08-29
Attending: INTERNAL MEDICINE
Payer: MEDICARE

## 2024-08-29 VITALS
TEMPERATURE: 98 F | HEIGHT: 72 IN | WEIGHT: 225.5 LBS | BODY MASS INDEX: 30.54 KG/M2 | OXYGEN SATURATION: 100 % | SYSTOLIC BLOOD PRESSURE: 136 MMHG | HEART RATE: 78 BPM | DIASTOLIC BLOOD PRESSURE: 80 MMHG | RESPIRATION RATE: 16 BRPM

## 2024-08-29 DIAGNOSIS — Z94.0 S/P KIDNEY TRANSPLANT: Primary | ICD-10-CM

## 2024-08-29 PROCEDURE — 25000003 PHARM REV CODE 250: Performed by: INTERNAL MEDICINE

## 2024-08-29 PROCEDURE — 96365 THER/PROPH/DIAG IV INF INIT: CPT

## 2024-08-29 PROCEDURE — 63600175 PHARM REV CODE 636 W HCPCS: Mod: JW,JG,UD | Performed by: INTERNAL MEDICINE

## 2024-08-29 RX ORDER — HEPARIN 100 UNIT/ML
500 SYRINGE INTRAVENOUS
OUTPATIENT
Start: 2024-09-26

## 2024-08-29 RX ORDER — EPINEPHRINE 0.3 MG/.3ML
0.3 INJECTION SUBCUTANEOUS ONCE AS NEEDED
OUTPATIENT
Start: 2024-09-26

## 2024-08-29 RX ORDER — DIPHENHYDRAMINE HYDROCHLORIDE 50 MG/ML
50 INJECTION INTRAMUSCULAR; INTRAVENOUS ONCE AS NEEDED
OUTPATIENT
Start: 2024-09-26

## 2024-08-29 RX ORDER — DIPHENHYDRAMINE HYDROCHLORIDE 50 MG/ML
50 INJECTION INTRAMUSCULAR; INTRAVENOUS ONCE AS NEEDED
Status: DISCONTINUED | OUTPATIENT
Start: 2024-08-29 | End: 2024-08-29 | Stop reason: HOSPADM

## 2024-08-29 RX ORDER — EPINEPHRINE 0.3 MG/.3ML
0.3 INJECTION SUBCUTANEOUS ONCE AS NEEDED
Status: DISCONTINUED | OUTPATIENT
Start: 2024-08-29 | End: 2024-08-29 | Stop reason: HOSPADM

## 2024-08-29 RX ADMIN — DEXTROSE MONOHYDRATE 512.5 MG: 50 INJECTION, SOLUTION INTRAVENOUS at 07:08

## 2024-09-26 ENCOUNTER — INFUSION (OUTPATIENT)
Dept: INFUSION THERAPY | Facility: HOSPITAL | Age: 59
End: 2024-09-26
Attending: INTERNAL MEDICINE
Payer: MEDICARE

## 2024-09-26 VITALS
SYSTOLIC BLOOD PRESSURE: 114 MMHG | OXYGEN SATURATION: 100 % | RESPIRATION RATE: 16 BRPM | DIASTOLIC BLOOD PRESSURE: 71 MMHG | HEIGHT: 72 IN | BODY MASS INDEX: 30.61 KG/M2 | HEART RATE: 65 BPM | TEMPERATURE: 98 F | WEIGHT: 226 LBS

## 2024-09-26 DIAGNOSIS — Z94.0 S/P KIDNEY TRANSPLANT: Primary | ICD-10-CM

## 2024-09-26 PROCEDURE — 96365 THER/PROPH/DIAG IV INF INIT: CPT

## 2024-09-26 PROCEDURE — 63600175 PHARM REV CODE 636 W HCPCS: Mod: JG,UD | Performed by: INTERNAL MEDICINE

## 2024-09-26 PROCEDURE — 25000003 PHARM REV CODE 250: Performed by: INTERNAL MEDICINE

## 2024-09-26 RX ORDER — DIPHENHYDRAMINE HYDROCHLORIDE 50 MG/ML
50 INJECTION INTRAMUSCULAR; INTRAVENOUS ONCE AS NEEDED
Status: DISCONTINUED | OUTPATIENT
Start: 2024-09-26 | End: 2024-09-26 | Stop reason: HOSPADM

## 2024-09-26 RX ORDER — EPINEPHRINE 0.3 MG/.3ML
0.3 INJECTION SUBCUTANEOUS ONCE AS NEEDED
OUTPATIENT
Start: 2024-10-24

## 2024-09-26 RX ORDER — DIPHENHYDRAMINE HYDROCHLORIDE 50 MG/ML
50 INJECTION INTRAMUSCULAR; INTRAVENOUS ONCE AS NEEDED
OUTPATIENT
Start: 2024-10-24

## 2024-09-26 RX ORDER — EPINEPHRINE 0.3 MG/.3ML
0.3 INJECTION SUBCUTANEOUS ONCE AS NEEDED
Status: DISCONTINUED | OUTPATIENT
Start: 2024-09-26 | End: 2024-09-26 | Stop reason: HOSPADM

## 2024-09-26 RX ORDER — HEPARIN 100 UNIT/ML
500 SYRINGE INTRAVENOUS
OUTPATIENT
Start: 2024-10-24

## 2024-09-26 RX ADMIN — DEXTROSE MONOHYDRATE 512.5 MG: 50 INJECTION, SOLUTION INTRAVENOUS at 07:09

## 2024-10-01 ENCOUNTER — PATIENT MESSAGE (OUTPATIENT)
Dept: TRANSPLANT | Facility: CLINIC | Age: 59
End: 2024-10-01
Payer: MEDICARE

## 2024-10-03 ENCOUNTER — DOCUMENTATION ONLY (OUTPATIENT)
Dept: TRANSPLANT | Facility: CLINIC | Age: 59
End: 2024-10-03
Payer: MEDICARE

## 2024-10-03 LAB
EXT ALBUMIN: 4 (ref 3.7–5.3)
EXT ANC: 6.7 (ref 1.4–6.5)
EXT BUN: 31 (ref 6–20)
EXT CALCIUM: 8.7 (ref 8.5–10.5)
EXT CHLORIDE: 107 (ref 101–112)
EXT CO2: 20 (ref 18–32)
EXT CREATININE: 1.63 MG/DL (ref 0.6–1.2)
EXT EGFR NO RACE VARIABLE: 48
EXT GLUCOSE: 117 (ref 74–106)
EXT HEMATOCRIT: 40 (ref 43.5–53.7)
EXT HEMOGLOBIN: 12.5 (ref 14.1–18.1)
EXT PLATELETS: 269 (ref 142–424)
EXT POTASSIUM: 4.1 (ref 3.4–5.1)
EXT SODIUM: 137 MMOL/L (ref 135–145)
EXT WBC: 11.8 (ref 4.6–10.2)

## 2024-10-24 ENCOUNTER — INFUSION (OUTPATIENT)
Dept: INFUSION THERAPY | Facility: HOSPITAL | Age: 59
End: 2024-10-24
Attending: INTERNAL MEDICINE
Payer: MEDICARE

## 2024-10-24 VITALS
DIASTOLIC BLOOD PRESSURE: 80 MMHG | HEIGHT: 72 IN | BODY MASS INDEX: 30.16 KG/M2 | WEIGHT: 222.69 LBS | HEART RATE: 62 BPM | SYSTOLIC BLOOD PRESSURE: 132 MMHG | RESPIRATION RATE: 18 BRPM | TEMPERATURE: 98 F

## 2024-10-24 DIAGNOSIS — Z94.0 S/P KIDNEY TRANSPLANT: Primary | ICD-10-CM

## 2024-10-24 PROCEDURE — 63600175 PHARM REV CODE 636 W HCPCS: Mod: JZ,JG,UD | Performed by: INTERNAL MEDICINE

## 2024-10-24 PROCEDURE — 25000003 PHARM REV CODE 250: Performed by: INTERNAL MEDICINE

## 2024-10-24 PROCEDURE — 96365 THER/PROPH/DIAG IV INF INIT: CPT

## 2024-10-24 RX ORDER — DIPHENHYDRAMINE HYDROCHLORIDE 50 MG/ML
50 INJECTION INTRAMUSCULAR; INTRAVENOUS ONCE AS NEEDED
Status: DISCONTINUED | OUTPATIENT
Start: 2024-10-24 | End: 2024-10-24 | Stop reason: HOSPADM

## 2024-10-24 RX ORDER — DIPHENHYDRAMINE HYDROCHLORIDE 50 MG/ML
50 INJECTION INTRAMUSCULAR; INTRAVENOUS ONCE AS NEEDED
OUTPATIENT
Start: 2024-11-21

## 2024-10-24 RX ORDER — HEPARIN 100 UNIT/ML
500 SYRINGE INTRAVENOUS
OUTPATIENT
Start: 2024-11-21

## 2024-10-24 RX ORDER — EPINEPHRINE 0.3 MG/.3ML
0.3 INJECTION SUBCUTANEOUS ONCE AS NEEDED
OUTPATIENT
Start: 2024-11-21

## 2024-10-24 RX ORDER — EPINEPHRINE 0.3 MG/.3ML
0.3 INJECTION SUBCUTANEOUS ONCE AS NEEDED
Status: DISCONTINUED | OUTPATIENT
Start: 2024-10-24 | End: 2024-10-24 | Stop reason: HOSPADM

## 2024-10-24 RX ADMIN — DEXTROSE MONOHYDRATE: 50 INJECTION, SOLUTION INTRAVENOUS at 07:10

## 2024-10-24 RX ADMIN — DEXTROSE MONOHYDRATE 500 MG: 50 INJECTION, SOLUTION INTRAVENOUS at 07:10

## 2024-10-24 NOTE — PLAN OF CARE
Problem: Fatigue  Goal: Improved Activity Tolerance  Outcome: Progressing  Intervention: Promote Improved Energy  Flowsheets (Taken 10/24/2024 1626)  Fatigue Management: frequent rest breaks encouraged  Sleep/Rest Enhancement: regular sleep/rest pattern promoted  Activity Management:   Ambulated -L4   Up in chair - L3  Environmental Support:   calm environment promoted   rest periods encouraged

## 2024-11-19 DIAGNOSIS — Z94.0 S/P KIDNEY TRANSPLANT: ICD-10-CM

## 2024-11-19 DIAGNOSIS — Z79.60 LONG-TERM USE OF IMMUNOSUPPRESSANT MEDICATION: ICD-10-CM

## 2024-11-19 RX ORDER — MYCOPHENOLIC ACID 180 MG/1
720 TABLET, DELAYED RELEASE ORAL 2 TIMES DAILY
Qty: 240 TABLET | Refills: 11 | Status: CANCELLED | OUTPATIENT
Start: 2024-11-19 | End: 2025-11-19

## 2024-11-21 ENCOUNTER — INFUSION (OUTPATIENT)
Dept: INFUSION THERAPY | Facility: HOSPITAL | Age: 59
End: 2024-11-21
Attending: INTERNAL MEDICINE
Payer: MEDICARE

## 2024-11-21 VITALS
HEIGHT: 72 IN | HEART RATE: 80 BPM | WEIGHT: 231.69 LBS | BODY MASS INDEX: 31.38 KG/M2 | DIASTOLIC BLOOD PRESSURE: 73 MMHG | RESPIRATION RATE: 18 BRPM | TEMPERATURE: 98 F | SYSTOLIC BLOOD PRESSURE: 142 MMHG

## 2024-11-21 DIAGNOSIS — Z94.0 S/P KIDNEY TRANSPLANT: ICD-10-CM

## 2024-11-21 DIAGNOSIS — Z94.0 S/P KIDNEY TRANSPLANT: Primary | ICD-10-CM

## 2024-11-21 DIAGNOSIS — Z79.60 LONG-TERM USE OF IMMUNOSUPPRESSANT MEDICATION: ICD-10-CM

## 2024-11-21 PROCEDURE — 96365 THER/PROPH/DIAG IV INF INIT: CPT

## 2024-11-21 PROCEDURE — 25000003 PHARM REV CODE 250: Performed by: INTERNAL MEDICINE

## 2024-11-21 PROCEDURE — 63600175 PHARM REV CODE 636 W HCPCS: Mod: JW,JG,UD | Performed by: INTERNAL MEDICINE

## 2024-11-21 RX ORDER — HEPARIN 100 UNIT/ML
500 SYRINGE INTRAVENOUS
OUTPATIENT
Start: 2024-12-19

## 2024-11-21 RX ORDER — EPINEPHRINE 0.3 MG/.3ML
0.3 INJECTION SUBCUTANEOUS ONCE AS NEEDED
Status: DISCONTINUED | OUTPATIENT
Start: 2024-11-21 | End: 2024-11-21 | Stop reason: HOSPADM

## 2024-11-21 RX ORDER — EPINEPHRINE 0.3 MG/.3ML
0.3 INJECTION SUBCUTANEOUS ONCE AS NEEDED
OUTPATIENT
Start: 2024-12-19

## 2024-11-21 RX ORDER — DIPHENHYDRAMINE HYDROCHLORIDE 50 MG/ML
50 INJECTION INTRAMUSCULAR; INTRAVENOUS ONCE AS NEEDED
Status: DISCONTINUED | OUTPATIENT
Start: 2024-11-21 | End: 2024-11-21 | Stop reason: HOSPADM

## 2024-11-21 RX ORDER — MYCOPHENOLIC ACID 180 MG/1
720 TABLET, DELAYED RELEASE ORAL 2 TIMES DAILY
Qty: 240 TABLET | Refills: 11 | Status: SHIPPED | OUTPATIENT
Start: 2024-11-21 | End: 2025-11-21

## 2024-11-21 RX ORDER — DIPHENHYDRAMINE HYDROCHLORIDE 50 MG/ML
50 INJECTION INTRAMUSCULAR; INTRAVENOUS ONCE AS NEEDED
OUTPATIENT
Start: 2024-12-19

## 2024-11-21 RX ADMIN — DEXTROSE MONOHYDRATE 525 MG: 50 INJECTION, SOLUTION INTRAVENOUS at 07:11

## 2024-11-21 NOTE — PLAN OF CARE
Problem: Fatigue  Goal: Improved Activity Tolerance  11/21/2024 0728 by Patricia Posey, RN  Outcome: Met  11/21/2024 0728 by Patricia Posey, RN  Outcome: Progressing

## 2024-11-25 ENCOUNTER — PATIENT MESSAGE (OUTPATIENT)
Dept: TRANSPLANT | Facility: CLINIC | Age: 59
End: 2024-11-25
Payer: MEDICARE

## 2024-11-26 ENCOUNTER — TELEPHONE (OUTPATIENT)
Dept: TRANSPLANT | Facility: CLINIC | Age: 59
End: 2024-11-26
Payer: MEDICARE

## 2024-11-26 DIAGNOSIS — R10.9 ABDOMINAL PAIN, UNSPECIFIED ABDOMINAL LOCATION: Primary | ICD-10-CM

## 2024-11-26 NOTE — TELEPHONE ENCOUNTER
----- Message from Guerline Goel MD sent at 11/26/2024 10:59 AM CST -----  Regarding: RE: abdominal pain  Yes, let's try  Thank you!  ----- Message -----  From: Taylor Little RN  Sent: 11/26/2024  10:23 AM CST  To: Guerline Goel MD  Subject: abdominal pain                                   Dr. Navarro,   Remember over a year ago he c/o this abdominal bulge that was mildly painful?  He had a CT done July 2023 and we recommended a repeat this past June but it didn't get approved and wasn't done.  He's c/o the same thing.  He's due in December for his annual.  Can we order a CT to be scheduled here when he comes for his visit?  Swathi

## 2024-12-03 ENCOUNTER — DOCUMENTATION ONLY (OUTPATIENT)
Dept: TRANSPLANT | Facility: CLINIC | Age: 59
End: 2024-12-03
Payer: MEDICARE

## 2024-12-03 PROBLEM — N18.30 BENIGN HYPERTENSION WITH CKD (CHRONIC KIDNEY DISEASE) STAGE III: Status: ACTIVE | Noted: 2021-06-01

## 2024-12-03 PROBLEM — E66.09 CLASS 1 OBESITY DUE TO EXCESS CALORIES WITH SERIOUS COMORBIDITY AND BODY MASS INDEX (BMI) OF 31.0 TO 31.9 IN ADULT: Status: ACTIVE | Noted: 2024-12-03

## 2024-12-03 PROBLEM — N18.9 CKD (CHRONIC KIDNEY DISEASE): Status: RESOLVED | Noted: 2022-12-21 | Resolved: 2024-12-03

## 2024-12-03 PROBLEM — E66.811 CLASS 1 OBESITY DUE TO EXCESS CALORIES WITH SERIOUS COMORBIDITY AND BODY MASS INDEX (BMI) OF 31.0 TO 31.9 IN ADULT: Status: ACTIVE | Noted: 2024-12-03

## 2024-12-03 PROBLEM — I12.9 BENIGN HYPERTENSION WITH CKD (CHRONIC KIDNEY DISEASE) STAGE III: Status: ACTIVE | Noted: 2021-06-01

## 2024-12-03 LAB
EXT ALBUMIN: 2.9 (ref 2.7–4.5)
EXT ALKALINE PHOSPHATASE: ABNORMAL
EXT ALLOSURE: ABNORMAL
EXT ALT: ABNORMAL
EXT AMYLASE: ABNORMAL
EXT ANC: ABNORMAL
EXT AST: ABNORMAL
EXT BACTERIA UA: ABNORMAL
EXT BANDS%: ABNORMAL
EXT BILIRUBIN DIRECT: ABNORMAL
EXT BILIRUBIN TOTAL: ABNORMAL
EXT BK VIRUS DNA QN PCR: ABNORMAL
EXT BUN: 25 (ref 6–20)
EXT C PEPTIDE: ABNORMAL
EXT CALCIUM: 8.7 (ref 8.5–10.5)
EXT CHLORIDE: 108 (ref 101–112)
EXT CHOLESTEROL: ABNORMAL
EXT CMV DNA QUANT. BY PCR: ABNORMAL
EXT CO2: 19 (ref 18–32)
EXT CREATININE UA: 84.9
EXT CREATININE: 1.55 MG/DL (ref 0.6–1.2)
EXT CYCLOSPORINE LVL: ABNORMAL
EXT EBV DNA BY PCR: ABNORMAL
EXT EBV IGG: ABNORMAL
EXT EGFR NO RACE VARIABLE: 51
EXT EOSINOPHIL%: 1.3 (ref 0–7)
EXT FERRITIN: ABNORMAL
EXT GFR MDRD AF AMER: ABNORMAL
EXT GFR MDRD NON AF AMER: ABNORMAL
EXT GLUCOSE UA: NEGATIVE
EXT GLUCOSE: 107 (ref 74–106)
EXT HBV DNA QUANT PCR: ABNORMAL
EXT HCV QUANT: ABNORMAL
EXT HDL: ABNORMAL
EXT HEMATOCRIT: 42.8 (ref 43.5–53.7)
EXT HEMOGLOBIN A1C: ABNORMAL
EXT HEMOGLOBIN: 13.1 (ref 14.1–18.1)
EXT HIV RNA QUANT PCR: ABNORMAL
EXT IMMUNKNOW (STIMULATED): ABNORMAL
EXT IRON SATURATION: ABNORMAL
EXT LDH, TOTAL: ABNORMAL
EXT LDL CHOLESTEROL: ABNORMAL
EXT LEFLUNOMIDE METABOLITE: ABNORMAL
EXT LIPASE: ABNORMAL
EXT LYMPH%: 38.6 (ref 10–50)
EXT MAGNESIUM: ABNORMAL
EXT MONOCYTES%: 8.1 (ref 0–12)
EXT NITRITES UA: NEGATIVE
EXT PHOSPHORUS: 2.9 (ref 2.7–4.5)
EXT PLATELETS: 293 (ref 142–424)
EXT POTASSIUM: 3.7 (ref 3.4–5.1)
EXT PROT/CREAT RATIO UR: 0.24
EXT PROTEIN TOTAL: ABNORMAL
EXT PROTEIN UA: ABNORMAL
EXT PTH, INTACT: ABNORMAL
EXT RBC UA: ABNORMAL
EXT SEGS%: 51.6 (ref 37–80)
EXT SERUM IRON: ABNORMAL
EXT SIROLIMUS LVL: ABNORMAL
EXT SODIUM: 137 MMOL/L (ref 135–145)
EXT TACROLIMUS LVL: ABNORMAL
EXT TIBC: ABNORMAL
EXT TRIGLYCERIDES: ABNORMAL
EXT URIC ACID: ABNORMAL
EXT URINE CULTURE: ABNORMAL
EXT URINE PROTEIN: 20.7
EXT VIT D 25 HYDROXY: ABNORMAL
EXT WBC UA: ABNORMAL
EXT WBC: 10.8 (ref 4.6–10.2)
PARVOVIRUS B19 DNA BY PCR: ABNORMAL
QUANTIFERON GOLD TB: ABNORMAL

## 2024-12-03 NOTE — PROGRESS NOTES
Kidney Post-Transplant Assessment    Referring Physician: Héctor Calvillo  Current Nephrologist: Héctor Calvillo    ORGAN: RIGHT KIDNEY  Donor Type: donation after brain death  PHS Increased Risk: no  Cold Ischemia: 1,221 mins  Induction Medications: simulect - basiliximab    Subjective:     CC:  Reassessment of renal allograft function and management of chronic immunosuppression.    HPI:  Mr. Valdez is a 59 y.o. 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 His  baseline creatinine was ~1.2-1.6, then bryan to 1.63.  He takes mycophenolate mofetil, prednisone, and belatacept  for maintenance immunosuppression. His post transplant course has been uncomplicated to date.    Transplant History:  -ESRD secondary to DM.   -on PD until DBD KTX 12/25/21 (Simulect induction, CIT 20h 21m, KDPI 65%, CMV D+/R+).   -Per op note, small mass excised for biopsy at donor site and determined to be benign, but excision site left a 1.5-2cm wide superficial defect that caused expected bleeding after reperfusion that was unable to be sutured due to shape and risk of tearing parenchyma, therefore, evarrest patch applied with complete hemostasis     Pertinent HX:   gastric bypass c/b severe reflux, treated with esoph dilation  DM 1995  PE, incidentally found large PE 12/2020 treated x 3 mos [4 mos after bariatric and foot surgery, found during w/u for SOB]  4/2022 LE DVT --> indefinite xalrelto  Sleep apnea  Hypotension on midodrine since gastric bypass  DKT 12/25/21; CIT 20+h; KDPI 65%;  1/28/22 high ALT- bactrim d/c'd    Interval History    LOV 12/27/23:   RLQ pain/ intermittent--noticeable when needing to have a BM : ABD was imaged a year ago. Repeat  CTA/P WO today     HX PAF: Had cardioversion for afib last week   Getting loop recorder placed tomorrow     Reports doing well. No complaints.  Receiving his Patricia monthly.  No longer on TAC  Intake  -reports adequate water  intake  UOP-no problems reported   Peripheral edema-none   Denies N/V/D; tolerating IS meds well  BP Readings from Last 3 Encounters:   12/09/24 (!) 133/90   11/21/24 (!) 142/73   10/24/24 132/80       Lab /diagnostic results reviewed with patient today.    Following with his local nephrologist        Past Medical History:   Diagnosis Date    Chronic pulmonary embolism 6/1/2021    Diabetes mellitus     Diabetic nephropathy associated with type 2 diabetes mellitus 6/1/2021    Disorder of kidney and ureter     ESRD (end stage renal disease) 6/1/2021    Essential hypertension 6/1/2021    GERD (gastroesophageal reflux disease)     Mixed hyperlipidemia 6/1/2021    Sleep apnea 6/1/2021       Review of Systems   Constitutional: Negative.  Negative for activity change, appetite change, chills, fatigue, fever and unexpected weight change.   HENT:  Negative for congestion, facial swelling, postnasal drip, rhinorrhea, sinus pressure, sore throat and trouble swallowing.    Eyes:  Positive for visual disturbance. Negative for pain and redness.        Wears glasses   Respiratory:  Negative for cough, chest tightness, shortness of breath and wheezing.    Cardiovascular:  Positive for palpitations (PAF). Negative for chest pain and leg swelling.   Gastrointestinal:  Negative for abdominal pain, diarrhea, nausea and vomiting.        GERD   Genitourinary:  Negative for difficulty urinating, dysuria, flank pain, frequency and urgency.   Musculoskeletal:  Negative for gait problem, neck pain and neck stiffness.   Skin:  Negative for rash.   Allergic/Immunologic: Positive for immunocompromised state. Negative for environmental allergies and food allergies.   Neurological:  Negative for dizziness, weakness, light-headedness and headaches.   Hematological:  Bruises/bleeds easily (xarelto).   Psychiatric/Behavioral:  Negative for agitation and confusion. The patient is not nervous/anxious.        Objective:   Blood pressure (!) 133/90,  pulse 93, temperature 97 °F (36.1 °C), temperature source Temporal, resp. rate 18, height 6' (1.829 m), weight 102.9 kg (226 lb 13.7 oz), SpO2 98%.body mass index is 30.77 kg/m².    Physical Exam  Constitutional:       Appearance: Normal appearance.   Cardiovascular:      Rate and Rhythm: Normal rate. Rhythm irregular.   Pulmonary:      Effort: Pulmonary effort is normal.      Breath sounds: Normal breath sounds.   Abdominal:      Palpations: Abdomen is soft. There is no mass.      Tenderness: There is no abdominal tenderness.   Musculoskeletal:      Right lower leg: No edema.      Left lower leg: No edema.         Labs:  Lab Results   Component Value Date    WBC 10.92 01/13/2023    HGB 13.6 (L) 01/13/2023    HCT 43.1 01/13/2023     01/13/2023    K 4 12/05/2024     (H) 01/13/2023    CO2 17 (L) 01/13/2023    BUN 24 (H) 01/13/2023    CREATININE 1.4 01/13/2023    EGFRNONAA >60.0 06/22/2022    CALCIUM 9.5 01/13/2023    PHOS 3.1 01/13/2023    MG 1.6 01/13/2023    ALBUMIN 3.7 01/13/2023    ALBUMIN 3.7 01/13/2023    AST 18 01/13/2023    ALT 32 01/13/2023    UTPCR Unable to calculate 01/24/2022    PTH 79.1 (H) 01/13/2023    TACROLIMUS 7.7 01/13/2023       Lab Results   Component Value Date    EXTANC 6.7 (A) 09/30/2024    EXTWBC 10.8 (H) 12/02/2024    EXTSEGS 51.6 12/02/2024    EXTPLATELETS 293 12/02/2024    EXTHEMOGLOBI 13.1 (L) 12/02/2024    EXTHEMATOCRI 42.8 (L) 12/02/2024    EXTCREATININ 1.55 (H) 12/02/2024    EXTCREATININ 84.9 12/02/2024    EXTSODIUM 137 12/02/2024    EXTPOTASSIUM 3.7 12/02/2024    EXTBUN 25 (H) 12/02/2024    EXTCO2 19 12/02/2024    EXTCALCIUM 8.7 12/02/2024    EXTPHOSPHORU 2.9 12/02/2024    EXTGLUCOSE 107 (H) 12/02/2024    EXTALBUMIN 2.9 12/02/2024    EXTAST 22 11/22/2022    EXTALT 38 11/22/2022    EXTBILITOTAL 0.7 11/22/2022       Lab Results   Component Value Date    EXTTACROLVL 6.3 08/22/2023    EXTPROTCRE 0.24 12/02/2024    EXTPTHINTACT 171.1 (A) 03/23/2022    EXTPROTEINUA trace  2024    EXTWBCUA rare 2024    EXTRBCUA none 2024       Labs were reviewed with the patient    Assessment:     1. S/P kidney transplant    2. Long-term use of immunosuppressant medication    3. Type 2 diabetes mellitus with stage 3a chronic kidney disease, with long-term current use of insulin    4. Secondary hyperparathyroidism of renal origin    5. At risk for opportunistic infections    6. Class 1 obesity due to excess calories with serious comorbidity and body mass index (BMI) of 30.0 to 30.9 in adult            Plan:   -on jose follow-up and labs as protocol    PAF--cont f/u with cardiology. Mgmt deferred      RLQ pain/ intermittent : repeat CTA/P WO today     1) s/p  kidney transplant             CKD II-IIIa post  donor kidney transplant 2021  -creatinine baseline of 1.2-1.6  -negligible proteinuria noted  -continue monitoring as per transplant guideline.    -I emphasized importance of ongoing follow-up with home nephrologist as our visit stretch out will eventually become p.r.n.   -cont jose inf as prescribed    - cont on  Mfy 720 mg BID   -prednisone  5 mg QD   -Will continue to monitor for drug toxicities    24  Creatinine 0.60 - 1.20 mg/dL 1.55 High           eGFR >=60 mls/min/1.73m2 51 Low          2)  HTN/PAF: advise low salt diet and home BP monitoring  - Cont  valsartan, nifedipine, Toprol XL    HX PE: cont Xarelto as prescribed -mgmt deferred to cardiology   BP Readings from Last 3 Encounters:   24 (!) 133/90   24 (!) 142/73   10/24/24 132/80         type II DM:   -  MED REGIME   Mgmt deferred to endocrinology    Lab Results   Component Value Date    HGBA1C 6.3 (H) 2023         3) Hypophosphatemia, Hypomagnesemia, Secondary hyperparathyroidism:  - no intervention required ,will continue to monitor/ guidelines                -continue Vit D3 -->MGMT deferred to general nephrology  24            4) Metabolic acidosis/Electrolyte balance:  - no  intervention required ,will continue to monitor/ guidelines     No meds  12/1/24         5) Anemia/Cytopenias:   will monitor as per our guidelines. Medicine list reviewed including potential causes of drug-induced cytopenias                - no intervention required ,will continue to monitor/ guidelines   ANC 5600      12/1/24        6) Proteinuria: continue p/c ratio as per guidelines   Protein Creatinine Ratios:        .     7)  At risk for opportunistic infections   -negative for BK 06/19/2023.  Continue screening for BK as per guideline the patient at risk nephropathy graft failure if develops unchecked bk infection    8) Weight education: provided during the clinic visit   Body mass index is 30.77 kg/m².        Follow-up:   Clinic: return to transplant clinic weekly for the first month after transplant; every 2 weeks during months 2-3; then at 6-, 9-, 12-, 18-, 24-, and 36- months post-transplant to reassess for complications from immunosuppression toxicity and monitor for rejection.  Annually thereafter.    Labs: since patient remains at high risk for rejection and drug-related complications that warrant close monitoring, labs will be ordered as follows: continue twice weekly CBC, renal panel, and drug level for first month; then same labs once weekly through 3rd month post-transplant.  Urine for UA and protein/creatinine ratio monthly.  Serum BK - PCR at 1-, 3-, 6-, 9-, 12-, 18-, 24-, 36-, 48-, and 60 months post-transplant.  Hepatic panel at 1-, 2-, 3-, 6-, 9-, 12-, 18-, 24-, and 36- months post-transplant.    Zara Quiroga NP

## 2024-12-09 ENCOUNTER — HOSPITAL ENCOUNTER (OUTPATIENT)
Dept: RADIOLOGY | Facility: HOSPITAL | Age: 59
Discharge: HOME | End: 2024-12-09
Attending: INTERNAL MEDICINE
Payer: MEDICARE

## 2024-12-09 ENCOUNTER — OFFICE VISIT (OUTPATIENT)
Dept: TRANSPLANT | Facility: CLINIC | Age: 59
End: 2024-12-09
Payer: MEDICARE

## 2024-12-09 VITALS
HEART RATE: 93 BPM | SYSTOLIC BLOOD PRESSURE: 133 MMHG | BODY MASS INDEX: 30.73 KG/M2 | OXYGEN SATURATION: 98 % | TEMPERATURE: 97 F | DIASTOLIC BLOOD PRESSURE: 90 MMHG | WEIGHT: 226.88 LBS | RESPIRATION RATE: 18 BRPM | HEIGHT: 72 IN

## 2024-12-09 DIAGNOSIS — R10.9 ABDOMINAL PAIN, UNSPECIFIED ABDOMINAL LOCATION: ICD-10-CM

## 2024-12-09 DIAGNOSIS — Z94.0 S/P KIDNEY TRANSPLANT: Primary | ICD-10-CM

## 2024-12-09 DIAGNOSIS — E66.811 CLASS 1 OBESITY DUE TO EXCESS CALORIES WITH SERIOUS COMORBIDITY AND BODY MASS INDEX (BMI) OF 30.0 TO 30.9 IN ADULT: ICD-10-CM

## 2024-12-09 DIAGNOSIS — Z91.89 AT RISK FOR OPPORTUNISTIC INFECTIONS: ICD-10-CM

## 2024-12-09 DIAGNOSIS — E11.22 TYPE 2 DIABETES MELLITUS WITH STAGE 3A CHRONIC KIDNEY DISEASE, WITH LONG-TERM CURRENT USE OF INSULIN: ICD-10-CM

## 2024-12-09 DIAGNOSIS — N18.31 TYPE 2 DIABETES MELLITUS WITH STAGE 3A CHRONIC KIDNEY DISEASE, WITH LONG-TERM CURRENT USE OF INSULIN: ICD-10-CM

## 2024-12-09 DIAGNOSIS — N25.81 SECONDARY HYPERPARATHYROIDISM OF RENAL ORIGIN: ICD-10-CM

## 2024-12-09 DIAGNOSIS — E66.09 CLASS 1 OBESITY DUE TO EXCESS CALORIES WITH SERIOUS COMORBIDITY AND BODY MASS INDEX (BMI) OF 30.0 TO 30.9 IN ADULT: ICD-10-CM

## 2024-12-09 DIAGNOSIS — Z79.60 LONG-TERM USE OF IMMUNOSUPPRESSANT MEDICATION: ICD-10-CM

## 2024-12-09 DIAGNOSIS — Z79.4 TYPE 2 DIABETES MELLITUS WITH STAGE 3A CHRONIC KIDNEY DISEASE, WITH LONG-TERM CURRENT USE OF INSULIN: ICD-10-CM

## 2024-12-09 PROCEDURE — 99999 PR PBB SHADOW E&M-EST. PATIENT-LVL V: CPT | Mod: PBBFAC,,, | Performed by: NURSE PRACTITIONER

## 2024-12-09 PROCEDURE — 74176 CT ABD & PELVIS W/O CONTRAST: CPT | Mod: TC

## 2024-12-09 PROCEDURE — 74176 CT ABD & PELVIS W/O CONTRAST: CPT | Mod: 26,,, | Performed by: RADIOLOGY

## 2024-12-09 RX ORDER — METOPROLOL SUCCINATE 50 MG/1
50 TABLET, EXTENDED RELEASE ORAL DAILY
COMMUNITY
Start: 2024-11-20 | End: 2025-11-20

## 2024-12-09 RX ORDER — SEMAGLUTIDE 2.68 MG/ML
2 INJECTION, SOLUTION SUBCUTANEOUS
COMMUNITY

## 2024-12-09 RX ORDER — MECLIZINE HYDROCHLORIDE 25 MG/1
25 TABLET ORAL 3 TIMES DAILY PRN
COMMUNITY
Start: 2024-10-25 | End: 2025-10-25

## 2024-12-09 RX ORDER — FUROSEMIDE 20 MG/1
20 TABLET ORAL EVERY OTHER DAY
COMMUNITY
Start: 2024-08-27 | End: 2025-08-27

## 2024-12-09 NOTE — LETTER
December 9, 2024        Héctor Calvillo  [facility] [address]  [facility] [place] [address]  Phone: [phone]  Fax: [phone]             [facility]  [address]  [facility] [address]  Phone: [phone]   Patient: Antwon Valdez   MR Number: [id number]   YOB: 1965   Date of Visit: 12/9/2024       Dear Dr. Héctor Calvillo    Thank you for referring Antwon Valdez to me for evaluation. Attached you will find relevant portions of my assessment and plan of care.    If you have questions, please do not hesitate to call me. I look forward to following Antwon Valdez along with you.    Sincerely,    Zara Quiroga, NP    Enclosure    If you would like to receive this communication electronically, please contact [email or web address] or [phone] to request Hashable Link access.    [employer]able Link is a tool which provides read-only access to select patient information with whom you have a relationship. Its easy to use and provides real time access to review your patients record including encounter summaries, notes, results, and demographic information.    If you feel you have received this communication in error or would no longer like to receive these types of communications, please e-mail [email or web address]

## 2024-12-16 DIAGNOSIS — Z94.0 S/P KIDNEY TRANSPLANT: ICD-10-CM

## 2024-12-16 RX ORDER — PREDNISONE 5 MG/1
5 TABLET ORAL DAILY
Qty: 93 TABLET | Refills: 3 | Status: SHIPPED | OUTPATIENT
Start: 2024-12-16

## 2024-12-19 ENCOUNTER — INFUSION (OUTPATIENT)
Dept: INFUSION THERAPY | Facility: HOSPITAL | Age: 59
End: 2024-12-19
Attending: INTERNAL MEDICINE
Payer: MEDICARE

## 2024-12-19 VITALS
OXYGEN SATURATION: 99 % | HEIGHT: 72 IN | DIASTOLIC BLOOD PRESSURE: 64 MMHG | TEMPERATURE: 99 F | SYSTOLIC BLOOD PRESSURE: 111 MMHG | RESPIRATION RATE: 16 BRPM | BODY MASS INDEX: 30.38 KG/M2 | HEART RATE: 61 BPM | WEIGHT: 224.31 LBS

## 2024-12-19 DIAGNOSIS — Z94.0 S/P KIDNEY TRANSPLANT: Primary | ICD-10-CM

## 2024-12-19 PROCEDURE — 96365 THER/PROPH/DIAG IV INF INIT: CPT

## 2024-12-19 PROCEDURE — 25000003 PHARM REV CODE 250: Performed by: INTERNAL MEDICINE

## 2024-12-19 PROCEDURE — 63600175 PHARM REV CODE 636 W HCPCS: Mod: JG,UD | Performed by: INTERNAL MEDICINE

## 2024-12-19 RX ORDER — HEPARIN 100 UNIT/ML
500 SYRINGE INTRAVENOUS
OUTPATIENT
Start: 2025-01-16

## 2024-12-19 RX ORDER — DIPHENHYDRAMINE HYDROCHLORIDE 50 MG/ML
50 INJECTION INTRAMUSCULAR; INTRAVENOUS ONCE AS NEEDED
Status: DISCONTINUED | OUTPATIENT
Start: 2024-12-19 | End: 2024-12-19 | Stop reason: HOSPADM

## 2024-12-19 RX ORDER — DIPHENHYDRAMINE HYDROCHLORIDE 50 MG/ML
50 INJECTION INTRAMUSCULAR; INTRAVENOUS ONCE AS NEEDED
OUTPATIENT
Start: 2025-01-16

## 2024-12-19 RX ORDER — EPINEPHRINE 0.3 MG/.3ML
0.3 INJECTION SUBCUTANEOUS ONCE AS NEEDED
Status: DISCONTINUED | OUTPATIENT
Start: 2024-12-19 | End: 2024-12-19 | Stop reason: HOSPADM

## 2024-12-19 RX ORDER — EPINEPHRINE 0.3 MG/.3ML
0.3 INJECTION SUBCUTANEOUS ONCE AS NEEDED
OUTPATIENT
Start: 2025-01-16

## 2024-12-19 RX ADMIN — DEXTROSE MONOHYDRATE 512.5 MG: 50 INJECTION, SOLUTION INTRAVENOUS at 07:12

## 2024-12-19 NOTE — PLAN OF CARE
Problem: Fatigue  Goal: Improved Activity Tolerance  Outcome: Progressing  Intervention: Promote Improved Energy  Flowsheets (Taken 12/19/2024 7522)  Fatigue Management: frequent rest breaks encouraged  Sleep/Rest Enhancement: regular sleep/rest pattern promoted  Environmental Support: rest periods encouraged

## 2025-01-14 ENCOUNTER — PATIENT MESSAGE (OUTPATIENT)
Dept: TRANSPLANT | Facility: CLINIC | Age: 60
End: 2025-01-14
Payer: MEDICARE

## 2025-01-16 ENCOUNTER — INFUSION (OUTPATIENT)
Dept: INFUSION THERAPY | Facility: HOSPITAL | Age: 60
End: 2025-01-16
Attending: INTERNAL MEDICINE
Payer: MEDICARE

## 2025-01-16 VITALS
SYSTOLIC BLOOD PRESSURE: 144 MMHG | HEIGHT: 72 IN | OXYGEN SATURATION: 99 % | DIASTOLIC BLOOD PRESSURE: 84 MMHG | TEMPERATURE: 98 F | HEART RATE: 110 BPM | RESPIRATION RATE: 16 BRPM | WEIGHT: 229.19 LBS | BODY MASS INDEX: 31.04 KG/M2

## 2025-01-16 DIAGNOSIS — Z94.0 S/P KIDNEY TRANSPLANT: Primary | ICD-10-CM

## 2025-01-16 PROCEDURE — 63600175 PHARM REV CODE 636 W HCPCS: Mod: TB,UD | Performed by: INTERNAL MEDICINE

## 2025-01-16 PROCEDURE — 96365 THER/PROPH/DIAG IV INF INIT: CPT

## 2025-01-16 PROCEDURE — 25000003 PHARM REV CODE 250: Performed by: INTERNAL MEDICINE

## 2025-01-16 RX ORDER — DIPHENHYDRAMINE HYDROCHLORIDE 50 MG/ML
50 INJECTION INTRAMUSCULAR; INTRAVENOUS ONCE AS NEEDED
OUTPATIENT
Start: 2025-02-13

## 2025-01-16 RX ORDER — HEPARIN 100 UNIT/ML
500 SYRINGE INTRAVENOUS
OUTPATIENT
Start: 2025-02-13

## 2025-01-16 RX ORDER — EPINEPHRINE 0.3 MG/.3ML
0.3 INJECTION SUBCUTANEOUS ONCE AS NEEDED
OUTPATIENT
Start: 2025-02-13

## 2025-01-16 RX ADMIN — DEXTROSE MONOHYDRATE: 50 INJECTION, SOLUTION INTRAVENOUS at 07:01

## 2025-01-16 RX ADMIN — DEXTROSE MONOHYDRATE 525 MG: 50 INJECTION, SOLUTION INTRAVENOUS at 07:01

## 2025-01-16 NOTE — PLAN OF CARE
Problem: Fatigue  Goal: Improved Activity Tolerance  Outcome: Progressing  Intervention: Promote Improved Energy  Flowsheets (Taken 1/16/2025 5441)  Fatigue Management:   fatigue-related activity identified   frequent rest breaks encouraged   paced activity encouraged  Sleep/Rest Enhancement:   regular sleep/rest pattern promoted   relaxation techniques promoted  Activity Management: Ambulated -L4  Environmental Support: rest periods encouraged

## 2025-02-13 ENCOUNTER — INFUSION (OUTPATIENT)
Dept: INFUSION THERAPY | Facility: HOSPITAL | Age: 60
End: 2025-02-13
Attending: INTERNAL MEDICINE
Payer: MEDICARE

## 2025-02-13 VITALS
HEIGHT: 72 IN | RESPIRATION RATE: 15 BRPM | WEIGHT: 220.81 LBS | HEART RATE: 67 BPM | DIASTOLIC BLOOD PRESSURE: 79 MMHG | BODY MASS INDEX: 29.91 KG/M2 | TEMPERATURE: 98 F | OXYGEN SATURATION: 98 % | SYSTOLIC BLOOD PRESSURE: 124 MMHG

## 2025-02-13 DIAGNOSIS — Z94.0 S/P KIDNEY TRANSPLANT: Primary | ICD-10-CM

## 2025-02-13 PROCEDURE — 63600175 PHARM REV CODE 636 W HCPCS: Mod: JZ,TB,UD | Performed by: INTERNAL MEDICINE

## 2025-02-13 PROCEDURE — 96365 THER/PROPH/DIAG IV INF INIT: CPT

## 2025-02-13 PROCEDURE — 25000003 PHARM REV CODE 250: Performed by: INTERNAL MEDICINE

## 2025-02-13 RX ORDER — EPINEPHRINE 0.3 MG/.3ML
0.3 INJECTION SUBCUTANEOUS ONCE AS NEEDED
Status: CANCELLED | OUTPATIENT
Start: 2025-02-13

## 2025-02-13 RX ORDER — DIPHENHYDRAMINE HYDROCHLORIDE 50 MG/ML
50 INJECTION INTRAMUSCULAR; INTRAVENOUS ONCE AS NEEDED
OUTPATIENT
Start: 2025-03-13

## 2025-02-13 RX ORDER — EPINEPHRINE 0.3 MG/.3ML
0.3 INJECTION SUBCUTANEOUS ONCE AS NEEDED
OUTPATIENT
Start: 2025-03-13

## 2025-02-13 RX ORDER — DIPHENHYDRAMINE HYDROCHLORIDE 50 MG/ML
50 INJECTION INTRAMUSCULAR; INTRAVENOUS ONCE AS NEEDED
Status: CANCELLED | OUTPATIENT
Start: 2025-02-13

## 2025-02-13 RX ORDER — HEPARIN 100 UNIT/ML
500 SYRINGE INTRAVENOUS
Status: CANCELLED | OUTPATIENT
Start: 2025-02-13

## 2025-02-13 RX ORDER — HEPARIN 100 UNIT/ML
500 SYRINGE INTRAVENOUS
OUTPATIENT
Start: 2025-03-13

## 2025-02-13 RX ADMIN — DEXTROSE MONOHYDRATE 500 MG: 50 INJECTION, SOLUTION INTRAVENOUS at 07:02

## 2025-02-13 RX ADMIN — DEXTROSE MONOHYDRATE: 5 INJECTION INTRAVENOUS at 07:02

## 2025-02-13 NOTE — PLAN OF CARE
Problem: Fatigue  Goal: Improved Activity Tolerance  Outcome: Progressing  Intervention: Promote Improved Energy  Flowsheets (Taken 2/13/2025 0741)  Fatigue Management:   fatigue-related activity identified   frequent rest breaks encouraged   paced activity encouraged  Sleep/Rest Enhancement:   regular sleep/rest pattern promoted   relaxation techniques promoted  Activity Management: Ambulated -L4  Environmental Support: rest periods encouraged

## 2025-02-13 NOTE — PLAN OF CARE
Problem: Fatigue  Goal: Improved Activity Tolerance  Intervention: Promote Improved Energy  Flowsheets (Taken 2/13/2025 1681)  Fatigue Management:   fatigue-related activity identified   frequent rest breaks encouraged   paced activity encouraged  Sleep/Rest Enhancement:   regular sleep/rest pattern promoted   relaxation techniques promoted  Activity Management: Ambulated -L4  Environmental Support: rest periods encouraged

## 2025-03-13 ENCOUNTER — INFUSION (OUTPATIENT)
Dept: INFUSION THERAPY | Facility: HOSPITAL | Age: 60
End: 2025-03-13
Attending: INTERNAL MEDICINE
Payer: MEDICARE

## 2025-03-13 VITALS
TEMPERATURE: 98 F | HEIGHT: 72 IN | SYSTOLIC BLOOD PRESSURE: 159 MMHG | OXYGEN SATURATION: 100 % | DIASTOLIC BLOOD PRESSURE: 80 MMHG | RESPIRATION RATE: 16 BRPM | HEART RATE: 73 BPM | WEIGHT: 226 LBS | BODY MASS INDEX: 30.61 KG/M2

## 2025-03-13 DIAGNOSIS — Z94.0 S/P KIDNEY TRANSPLANT: Primary | ICD-10-CM

## 2025-03-13 PROCEDURE — 25000003 PHARM REV CODE 250: Performed by: INTERNAL MEDICINE

## 2025-03-13 PROCEDURE — 63600175 PHARM REV CODE 636 W HCPCS: Mod: TB,UD | Performed by: INTERNAL MEDICINE

## 2025-03-13 PROCEDURE — 96365 THER/PROPH/DIAG IV INF INIT: CPT

## 2025-03-13 RX ORDER — HEPARIN 100 UNIT/ML
500 SYRINGE INTRAVENOUS
OUTPATIENT
Start: 2025-04-10

## 2025-03-13 RX ORDER — EPINEPHRINE 0.3 MG/.3ML
0.3 INJECTION SUBCUTANEOUS ONCE AS NEEDED
OUTPATIENT
Start: 2025-04-10

## 2025-03-13 RX ORDER — DIPHENHYDRAMINE HYDROCHLORIDE 50 MG/ML
50 INJECTION, SOLUTION INTRAMUSCULAR; INTRAVENOUS ONCE AS NEEDED
Status: DISCONTINUED | OUTPATIENT
Start: 2025-03-13 | End: 2025-03-13 | Stop reason: HOSPADM

## 2025-03-13 RX ORDER — EPINEPHRINE 0.3 MG/.3ML
0.3 INJECTION SUBCUTANEOUS ONCE AS NEEDED
Status: DISCONTINUED | OUTPATIENT
Start: 2025-03-13 | End: 2025-03-13 | Stop reason: HOSPADM

## 2025-03-13 RX ORDER — DIPHENHYDRAMINE HYDROCHLORIDE 50 MG/ML
50 INJECTION, SOLUTION INTRAMUSCULAR; INTRAVENOUS ONCE AS NEEDED
OUTPATIENT
Start: 2025-04-10

## 2025-03-13 RX ADMIN — DEXTROSE MONOHYDRATE 512.5 MG: 50 INJECTION, SOLUTION INTRAVENOUS at 07:03

## 2025-03-13 NOTE — PLAN OF CARE
Problem: Fatigue  Goal: Improved Activity Tolerance  Outcome: Progressing  Intervention: Promote Improved Energy  Flowsheets (Taken 3/13/2025 8241)  Fatigue Management: fatigue-related activity identified  Sleep/Rest Enhancement: reading promoted  Environmental Support: rest periods encouraged

## 2025-03-14 ENCOUNTER — PATIENT MESSAGE (OUTPATIENT)
Dept: TRANSPLANT | Facility: CLINIC | Age: 60
End: 2025-03-14
Payer: MEDICARE

## 2025-04-07 ENCOUNTER — INFUSION (OUTPATIENT)
Dept: INFUSION THERAPY | Facility: HOSPITAL | Age: 60
End: 2025-04-07
Attending: INTERNAL MEDICINE
Payer: MEDICARE

## 2025-04-07 VITALS
HEART RATE: 98 BPM | RESPIRATION RATE: 18 BRPM | BODY MASS INDEX: 30.44 KG/M2 | TEMPERATURE: 98 F | HEIGHT: 72 IN | DIASTOLIC BLOOD PRESSURE: 79 MMHG | SYSTOLIC BLOOD PRESSURE: 117 MMHG | WEIGHT: 224.75 LBS

## 2025-04-07 DIAGNOSIS — Z94.0 S/P KIDNEY TRANSPLANT: Primary | ICD-10-CM

## 2025-04-07 PROCEDURE — 63600175 PHARM REV CODE 636 W HCPCS: Mod: JW,TB,UD | Performed by: INTERNAL MEDICINE

## 2025-04-07 PROCEDURE — 96365 THER/PROPH/DIAG IV INF INIT: CPT

## 2025-04-07 PROCEDURE — 25000003 PHARM REV CODE 250: Performed by: INTERNAL MEDICINE

## 2025-04-07 RX ORDER — EPINEPHRINE 0.3 MG/.3ML
0.3 INJECTION SUBCUTANEOUS ONCE AS NEEDED
OUTPATIENT
Start: 2025-04-10

## 2025-04-07 RX ORDER — HEPARIN 100 UNIT/ML
500 SYRINGE INTRAVENOUS
OUTPATIENT
Start: 2025-04-10

## 2025-04-07 RX ORDER — EPINEPHRINE 0.3 MG/.3ML
0.3 INJECTION SUBCUTANEOUS ONCE AS NEEDED
Status: DISCONTINUED | OUTPATIENT
Start: 2025-04-07 | End: 2025-04-07 | Stop reason: HOSPADM

## 2025-04-07 RX ORDER — DIPHENHYDRAMINE HYDROCHLORIDE 50 MG/ML
50 INJECTION, SOLUTION INTRAMUSCULAR; INTRAVENOUS ONCE AS NEEDED
OUTPATIENT
Start: 2025-04-10

## 2025-04-07 RX ORDER — DIPHENHYDRAMINE HYDROCHLORIDE 50 MG/ML
50 INJECTION, SOLUTION INTRAMUSCULAR; INTRAVENOUS ONCE AS NEEDED
Status: DISCONTINUED | OUTPATIENT
Start: 2025-04-07 | End: 2025-04-07 | Stop reason: HOSPADM

## 2025-04-07 RX ADMIN — DEXTROSE MONOHYDRATE 512.5 MG: 50 INJECTION, SOLUTION INTRAVENOUS at 07:04

## 2025-04-07 NOTE — PLAN OF CARE
Problem: Fatigue  Goal: Improved Activity Tolerance  4/7/2025 0724 by Patricia Posey, RN  Outcome: Met  4/7/2025 0724 by Patricia Posey, RN  Outcome: Progressing

## 2025-04-14 ENCOUNTER — DOCUMENTATION ONLY (OUTPATIENT)
Dept: TRANSPLANT | Facility: CLINIC | Age: 60
End: 2025-04-14
Payer: MEDICARE

## 2025-04-14 LAB
EXT ALBUMIN: 3.9 (ref 3.7–5.3)
EXT ALKALINE PHOSPHATASE: 154 (ref 34–154)
EXT ALT: 43 (ref 7–55)
EXT AST: 22 (ref 8–35)
EXT BILIRUBIN TOTAL: 0.6 (ref 0.3–1.2)
EXT BUN: 20 (ref 6–20)
EXT CALCIUM: 8.3 (ref 8.5–10.5)
EXT CHLORIDE: 110 (ref 101–112)
EXT CO2: 17 (ref 18–32)
EXT CREATININE: 1.37 MG/DL (ref 0.6–1.2)
EXT EGFR NO RACE VARIABLE: 59
EXT EOSINOPHIL%: 0.9 (ref 0–7)
EXT GLUCOSE: 94 (ref 74–106)
EXT HEMATOCRIT: 41.7 (ref 43.5–53.7)
EXT HEMOGLOBIN: 12.7 (ref 14.1–18.1)
EXT LYMPH%: 34.1 (ref 10–50)
EXT MONOCYTES%: 8 (ref 0–12)
EXT PLATELETS: 259 (ref 142–424)
EXT POTASSIUM: 4 (ref 3.4–5.1)
EXT PROTEIN TOTAL: 6.5 (ref 6.4–8.3)
EXT SEGS%: 56.7 (ref 37–80)
EXT SODIUM: 136 MMOL/L (ref 135–145)
EXT WBC: 10.9 (ref 4.6–10.2)

## 2025-04-16 ENCOUNTER — RESULTS FOLLOW-UP (OUTPATIENT)
Dept: TRANSPLANT | Facility: HOSPITAL | Age: 60
End: 2025-04-16

## 2025-04-16 ENCOUNTER — PATIENT MESSAGE (OUTPATIENT)
Dept: TRANSPLANT | Facility: CLINIC | Age: 60
End: 2025-04-16
Payer: MEDICARE

## 2025-05-08 ENCOUNTER — INFUSION (OUTPATIENT)
Dept: INFUSION THERAPY | Facility: HOSPITAL | Age: 60
End: 2025-05-08
Attending: INTERNAL MEDICINE
Payer: MEDICARE

## 2025-05-08 VITALS
SYSTOLIC BLOOD PRESSURE: 161 MMHG | HEART RATE: 64 BPM | TEMPERATURE: 98 F | WEIGHT: 224.19 LBS | OXYGEN SATURATION: 100 % | BODY MASS INDEX: 30.37 KG/M2 | HEIGHT: 72 IN | RESPIRATION RATE: 18 BRPM | DIASTOLIC BLOOD PRESSURE: 75 MMHG

## 2025-05-08 DIAGNOSIS — Z94.0 S/P KIDNEY TRANSPLANT: Primary | ICD-10-CM

## 2025-05-08 PROCEDURE — 63600175 PHARM REV CODE 636 W HCPCS: Mod: TB,UD | Performed by: INTERNAL MEDICINE

## 2025-05-08 PROCEDURE — 96365 THER/PROPH/DIAG IV INF INIT: CPT

## 2025-05-08 PROCEDURE — 25000003 PHARM REV CODE 250: Performed by: INTERNAL MEDICINE

## 2025-05-08 RX ORDER — DIPHENHYDRAMINE HYDROCHLORIDE 50 MG/ML
50 INJECTION, SOLUTION INTRAMUSCULAR; INTRAVENOUS ONCE AS NEEDED
Status: DISCONTINUED | OUTPATIENT
Start: 2025-05-08 | End: 2025-05-08 | Stop reason: HOSPADM

## 2025-05-08 RX ORDER — EPINEPHRINE 0.3 MG/.3ML
0.3 INJECTION SUBCUTANEOUS ONCE AS NEEDED
Status: DISCONTINUED | OUTPATIENT
Start: 2025-05-08 | End: 2025-05-08 | Stop reason: HOSPADM

## 2025-05-08 RX ORDER — EPINEPHRINE 0.3 MG/.3ML
0.3 INJECTION SUBCUTANEOUS ONCE AS NEEDED
OUTPATIENT
Start: 2025-06-05

## 2025-05-08 RX ORDER — HEPARIN 100 UNIT/ML
500 SYRINGE INTRAVENOUS
OUTPATIENT
Start: 2025-06-05

## 2025-05-08 RX ORDER — DIPHENHYDRAMINE HYDROCHLORIDE 50 MG/ML
50 INJECTION, SOLUTION INTRAMUSCULAR; INTRAVENOUS ONCE AS NEEDED
OUTPATIENT
Start: 2025-06-05

## 2025-05-08 RX ADMIN — DEXTROSE MONOHYDRATE 512.5 MG: 50 INJECTION, SOLUTION INTRAVENOUS at 07:05

## 2025-05-08 NOTE — PLAN OF CARE
Problem: Fall Injury Risk  Goal: Absence of Fall and Fall-Related Injury  Outcome: Progressing  Intervention: Identify and Manage Contributors  Flowsheets (Taken 5/8/2025 0711)  Self-Care Promotion: independence encouraged  Medication Review/Management: medications reviewed  Intervention: Promote Injury-Free Environment  Flowsheets (Taken 5/8/2025 0711)  Safety Promotion/Fall Prevention: medications reviewed

## 2025-06-05 ENCOUNTER — INFUSION (OUTPATIENT)
Dept: INFUSION THERAPY | Facility: HOSPITAL | Age: 60
End: 2025-06-05
Attending: INTERNAL MEDICINE
Payer: MEDICARE

## 2025-06-05 VITALS
WEIGHT: 224.19 LBS | OXYGEN SATURATION: 100 % | TEMPERATURE: 98 F | DIASTOLIC BLOOD PRESSURE: 75 MMHG | HEART RATE: 63 BPM | BODY MASS INDEX: 30.37 KG/M2 | RESPIRATION RATE: 17 BRPM | HEIGHT: 72 IN | SYSTOLIC BLOOD PRESSURE: 130 MMHG

## 2025-06-05 DIAGNOSIS — Z94.0 S/P KIDNEY TRANSPLANT: Primary | ICD-10-CM

## 2025-06-05 PROCEDURE — 63600175 PHARM REV CODE 636 W HCPCS: Mod: JW,TB,UD | Performed by: INTERNAL MEDICINE

## 2025-06-05 PROCEDURE — 25000003 PHARM REV CODE 250: Performed by: INTERNAL MEDICINE

## 2025-06-05 PROCEDURE — 96365 THER/PROPH/DIAG IV INF INIT: CPT

## 2025-06-05 RX ORDER — EPINEPHRINE 0.3 MG/.3ML
0.3 INJECTION SUBCUTANEOUS ONCE AS NEEDED
Status: DISCONTINUED | OUTPATIENT
Start: 2025-06-05 | End: 2025-06-05 | Stop reason: HOSPADM

## 2025-06-05 RX ORDER — HEPARIN 100 UNIT/ML
500 SYRINGE INTRAVENOUS
OUTPATIENT
Start: 2025-07-03

## 2025-06-05 RX ORDER — EPINEPHRINE 0.3 MG/.3ML
0.3 INJECTION SUBCUTANEOUS ONCE AS NEEDED
OUTPATIENT
Start: 2025-07-03

## 2025-06-05 RX ORDER — DIPHENHYDRAMINE HYDROCHLORIDE 50 MG/ML
50 INJECTION, SOLUTION INTRAMUSCULAR; INTRAVENOUS ONCE AS NEEDED
Status: DISCONTINUED | OUTPATIENT
Start: 2025-06-05 | End: 2025-06-05 | Stop reason: HOSPADM

## 2025-06-05 RX ORDER — DIPHENHYDRAMINE HYDROCHLORIDE 50 MG/ML
50 INJECTION, SOLUTION INTRAMUSCULAR; INTRAVENOUS ONCE AS NEEDED
OUTPATIENT
Start: 2025-07-03

## 2025-06-05 RX ADMIN — DEXTROSE MONOHYDRATE: 50 INJECTION, SOLUTION INTRAVENOUS at 07:06

## 2025-06-05 RX ADMIN — DEXTROSE MONOHYDRATE 512.5 MG: 50 INJECTION, SOLUTION INTRAVENOUS at 07:06

## 2025-07-03 ENCOUNTER — DOCUMENTATION ONLY (OUTPATIENT)
Dept: TRANSPLANT | Facility: CLINIC | Age: 60
End: 2025-07-03
Payer: MEDICARE

## 2025-07-03 ENCOUNTER — INFUSION (OUTPATIENT)
Dept: INFUSION THERAPY | Facility: HOSPITAL | Age: 60
End: 2025-07-03
Attending: INTERNAL MEDICINE
Payer: MEDICARE

## 2025-07-03 ENCOUNTER — RESULTS FOLLOW-UP (OUTPATIENT)
Dept: TRANSPLANT | Facility: HOSPITAL | Age: 60
End: 2025-07-03

## 2025-07-03 VITALS
RESPIRATION RATE: 17 BRPM | HEART RATE: 66 BPM | SYSTOLIC BLOOD PRESSURE: 136 MMHG | HEIGHT: 72 IN | WEIGHT: 222.88 LBS | OXYGEN SATURATION: 100 % | BODY MASS INDEX: 30.19 KG/M2 | DIASTOLIC BLOOD PRESSURE: 71 MMHG | TEMPERATURE: 98 F

## 2025-07-03 DIAGNOSIS — Z94.0 S/P KIDNEY TRANSPLANT: Primary | ICD-10-CM

## 2025-07-03 LAB
EXT BUN: 31 (ref 6–20)
EXT CALCIUM: 8.7 (ref 8.5–10.5)
EXT CHLORIDE: 110 (ref 101–112)
EXT CO2: 19 (ref 18–32)
EXT CREATININE: 1.35 MG/DL (ref 0.6–1.2)
EXT EGFR NO RACE VARIABLE: 60
EXT GLUCOSE: 95 (ref 74–106)
EXT HEMATOCRIT: 39.8 (ref 43.5–53.7)
EXT HEMOGLOBIN: 12.4 (ref 14.1–18.1)
EXT PLATELETS: 271 (ref 142–424)
EXT POTASSIUM: 3.9 (ref 3.4–5.1)
EXT SODIUM: 139 MMOL/L (ref 135–145)
EXT WBC: 5.4 (ref 1.4–6.5)

## 2025-07-03 PROCEDURE — 63600175 PHARM REV CODE 636 W HCPCS: Mod: JZ,TB,UD | Performed by: INTERNAL MEDICINE

## 2025-07-03 PROCEDURE — 25000003 PHARM REV CODE 250: Performed by: INTERNAL MEDICINE

## 2025-07-03 PROCEDURE — 96365 THER/PROPH/DIAG IV INF INIT: CPT

## 2025-07-03 RX ORDER — EPINEPHRINE 0.3 MG/.3ML
0.3 INJECTION SUBCUTANEOUS ONCE AS NEEDED
OUTPATIENT
Start: 2025-07-31

## 2025-07-03 RX ORDER — HEPARIN 100 UNIT/ML
500 SYRINGE INTRAVENOUS
OUTPATIENT
Start: 2025-07-31

## 2025-07-03 RX ORDER — DIPHENHYDRAMINE HYDROCHLORIDE 50 MG/ML
50 INJECTION, SOLUTION INTRAMUSCULAR; INTRAVENOUS ONCE AS NEEDED
Status: DISCONTINUED | OUTPATIENT
Start: 2025-07-03 | End: 2025-07-03 | Stop reason: HOSPADM

## 2025-07-03 RX ORDER — DIPHENHYDRAMINE HYDROCHLORIDE 50 MG/ML
50 INJECTION, SOLUTION INTRAMUSCULAR; INTRAVENOUS ONCE AS NEEDED
OUTPATIENT
Start: 2025-07-31

## 2025-07-03 RX ORDER — EPINEPHRINE 0.3 MG/.3ML
0.3 INJECTION SUBCUTANEOUS ONCE AS NEEDED
Status: DISCONTINUED | OUTPATIENT
Start: 2025-07-03 | End: 2025-07-03 | Stop reason: HOSPADM

## 2025-07-03 RX ADMIN — DEXTROSE MONOHYDRATE 500 MG: 50 INJECTION, SOLUTION INTRAVENOUS at 08:07

## 2025-07-03 RX ADMIN — DEXTROSE MONOHYDRATE: 50 INJECTION, SOLUTION INTRAVENOUS at 08:07

## 2025-07-03 NOTE — PLAN OF CARE
Problem: Fatigue  Goal: Improved Activity Tolerance  Outcome: Progressing  Intervention: Promote Improved Energy  Flowsheets (Taken 7/3/2025 4801)  Fatigue Management: frequent rest breaks encouraged  Sleep/Rest Enhancement: regular sleep/rest pattern promoted  Activity Management: Ambulated -L4  Environmental Support: calm environment promoted

## 2025-07-31 ENCOUNTER — INFUSION (OUTPATIENT)
Dept: INFUSION THERAPY | Facility: HOSPITAL | Age: 60
End: 2025-07-31
Attending: INTERNAL MEDICINE
Payer: MEDICARE

## 2025-07-31 VITALS
OXYGEN SATURATION: 100 % | HEIGHT: 72 IN | BODY MASS INDEX: 30.97 KG/M2 | WEIGHT: 228.63 LBS | DIASTOLIC BLOOD PRESSURE: 72 MMHG | TEMPERATURE: 98 F | HEART RATE: 83 BPM | RESPIRATION RATE: 17 BRPM | SYSTOLIC BLOOD PRESSURE: 150 MMHG

## 2025-07-31 DIAGNOSIS — Z94.0 S/P KIDNEY TRANSPLANT: Primary | ICD-10-CM

## 2025-07-31 PROCEDURE — 63600175 PHARM REV CODE 636 W HCPCS: Mod: JZ,TB,UD | Performed by: INTERNAL MEDICINE

## 2025-07-31 PROCEDURE — 25000003 PHARM REV CODE 250: Performed by: INTERNAL MEDICINE

## 2025-07-31 PROCEDURE — 96365 THER/PROPH/DIAG IV INF INIT: CPT

## 2025-07-31 RX ORDER — EPINEPHRINE 0.3 MG/.3ML
0.3 INJECTION SUBCUTANEOUS ONCE AS NEEDED
OUTPATIENT
Start: 2025-08-28

## 2025-07-31 RX ORDER — HEPARIN 100 UNIT/ML
500 SYRINGE INTRAVENOUS
OUTPATIENT
Start: 2025-08-28

## 2025-07-31 RX ORDER — DIPHENHYDRAMINE HYDROCHLORIDE 50 MG/ML
50 INJECTION, SOLUTION INTRAMUSCULAR; INTRAVENOUS ONCE AS NEEDED
OUTPATIENT
Start: 2025-08-28

## 2025-07-31 RX ORDER — EPINEPHRINE 0.3 MG/.3ML
0.3 INJECTION SUBCUTANEOUS ONCE AS NEEDED
Status: DISCONTINUED | OUTPATIENT
Start: 2025-07-31 | End: 2025-07-31 | Stop reason: HOSPADM

## 2025-07-31 RX ORDER — DIPHENHYDRAMINE HYDROCHLORIDE 50 MG/ML
50 INJECTION, SOLUTION INTRAMUSCULAR; INTRAVENOUS ONCE AS NEEDED
Status: DISCONTINUED | OUTPATIENT
Start: 2025-07-31 | End: 2025-07-31 | Stop reason: HOSPADM

## 2025-07-31 RX ADMIN — DEXTROSE MONOHYDRATE 512.5 MG: 50 INJECTION, SOLUTION INTRAVENOUS at 07:07

## 2025-07-31 RX ADMIN — DEXTROSE MONOHYDRATE: 50 INJECTION, SOLUTION INTRAVENOUS at 07:07

## 2025-07-31 NOTE — PLAN OF CARE
Problem: Fatigue  Goal: Improved Activity Tolerance  Outcome: Progressing  Intervention: Promote Improved Energy  Flowsheets (Taken 7/31/2025 6916)  Sleep/Rest Enhancement: regular sleep/rest pattern promoted  Activity Management: Ambulated -L4  Environmental Support: calm environment promoted

## 2025-08-18 ENCOUNTER — PATIENT MESSAGE (OUTPATIENT)
Dept: TRANSPLANT | Facility: CLINIC | Age: 60
End: 2025-08-18
Payer: MEDICARE

## 2025-08-24 ENCOUNTER — PATIENT MESSAGE (OUTPATIENT)
Dept: TRANSPLANT | Facility: CLINIC | Age: 60
End: 2025-08-24
Payer: MEDICARE

## 2025-08-28 ENCOUNTER — INFUSION (OUTPATIENT)
Dept: INFUSION THERAPY | Facility: HOSPITAL | Age: 60
End: 2025-08-28
Attending: INTERNAL MEDICINE
Payer: MEDICARE

## 2025-08-28 VITALS
BODY MASS INDEX: 30.75 KG/M2 | SYSTOLIC BLOOD PRESSURE: 133 MMHG | HEIGHT: 72 IN | TEMPERATURE: 98 F | WEIGHT: 227 LBS | DIASTOLIC BLOOD PRESSURE: 69 MMHG | OXYGEN SATURATION: 100 % | HEART RATE: 60 BPM | RESPIRATION RATE: 16 BRPM

## 2025-08-28 DIAGNOSIS — Z94.0 S/P KIDNEY TRANSPLANT: Primary | ICD-10-CM

## 2025-08-28 PROCEDURE — 96365 THER/PROPH/DIAG IV INF INIT: CPT

## 2025-08-28 PROCEDURE — 63600175 PHARM REV CODE 636 W HCPCS: Mod: TB,UD | Performed by: INTERNAL MEDICINE

## 2025-08-28 PROCEDURE — 25000003 PHARM REV CODE 250: Performed by: INTERNAL MEDICINE

## 2025-08-28 RX ORDER — DIPHENHYDRAMINE HYDROCHLORIDE 50 MG/ML
50 INJECTION, SOLUTION INTRAMUSCULAR; INTRAVENOUS ONCE AS NEEDED
OUTPATIENT
Start: 2025-08-28 | End: 2037-01-23

## 2025-08-28 RX ORDER — HEPARIN 100 UNIT/ML
500 SYRINGE INTRAVENOUS
OUTPATIENT
Start: 2025-08-28

## 2025-08-28 RX ORDER — EPINEPHRINE 0.3 MG/.3ML
0.3 INJECTION SUBCUTANEOUS ONCE AS NEEDED
Status: DISCONTINUED | OUTPATIENT
Start: 2025-08-28 | End: 2025-08-28 | Stop reason: HOSPADM

## 2025-08-28 RX ORDER — EPINEPHRINE 0.3 MG/.3ML
0.3 INJECTION SUBCUTANEOUS ONCE AS NEEDED
OUTPATIENT
Start: 2025-08-28 | End: 2037-01-23

## 2025-08-28 RX ORDER — DIPHENHYDRAMINE HYDROCHLORIDE 50 MG/ML
50 INJECTION, SOLUTION INTRAMUSCULAR; INTRAVENOUS ONCE AS NEEDED
Status: DISCONTINUED | OUTPATIENT
Start: 2025-08-28 | End: 2025-08-28 | Stop reason: HOSPADM

## 2025-08-28 RX ADMIN — DEXTROSE MONOHYDRATE 512.5 MG: 50 INJECTION, SOLUTION INTRAVENOUS at 07:08

## (undated) DEVICE — SUT 3-0 12-18IN SILK

## (undated) DEVICE — SOL NS 1000CC

## (undated) DEVICE — CLIPPER BLADE MOD 4406 (CAREF)

## (undated) DEVICE — ADHESIVE DERMABOND ADVANCED

## (undated) DEVICE — DRESSING ABSRBNT ISLAND 3.6X8

## (undated) DEVICE — HEMOSTAT SURGICEL NU-KNIT 6X9

## (undated) DEVICE — PLUG CATHETER STERILE FOLEY

## (undated) DEVICE — TRAY FOLEY 16FR INFECTION CONT

## (undated) DEVICE — SUT SILK 3-0 STRANDS 30IN

## (undated) DEVICE — SUT PDS BV 6-0

## (undated) DEVICE — SUT 4-0 12-18IN SILK BLACK

## (undated) DEVICE — TOWEL OR XRAY WHITE 17X26IN

## (undated) DEVICE — Device

## (undated) DEVICE — BLADE SURG CARBON STEEL SZ11

## (undated) DEVICE — FOLEY BLLN 20FR 3WAY 5CC

## (undated) DEVICE — TIP YANKAUERS BULB NO VENT

## (undated) DEVICE — DRAPE SLUSH WARMER WITH DISC

## (undated) DEVICE — DRAIN CHANNEL ROUND 19FR

## (undated) DEVICE — SUT PROLENE 6-0 BV-1 30IN

## (undated) DEVICE — SUT 2-0 12-18IN SILK

## (undated) DEVICE — SUT ETHILON 3-0 PS2 18 BLK

## (undated) DEVICE — SEE MEDLINE ITEM 156900

## (undated) DEVICE — SUT PROLENE 5-0 36IN C-1

## (undated) DEVICE — STAPLER SKIN PROXIMATE WIDE

## (undated) DEVICE — FIBRILLAR ABS HEMOSTAT 4X4

## (undated) DEVICE — SET DECANTER MEDICHOICE

## (undated) DEVICE — SET IRR URLGY 2LINE UNIV SPIKE

## (undated) DEVICE — ELECTRODE REM PLYHSV RETURN 9

## (undated) DEVICE — SYR ONLY LUER LOCK 20CC

## (undated) DEVICE — STOCKINETTE 2INX36

## (undated) DEVICE — PUNCH AORTIC 4.0MM 6/CASE

## (undated) DEVICE — PATCH SEALANT EVARREST 2X4

## (undated) DEVICE — SUT SILK 2-0 STRANDS 30IN

## (undated) DEVICE — SUT 1 36IN PDS II VIO MONO